# Patient Record
Sex: FEMALE | Race: WHITE | NOT HISPANIC OR LATINO
[De-identification: names, ages, dates, MRNs, and addresses within clinical notes are randomized per-mention and may not be internally consistent; named-entity substitution may affect disease eponyms.]

---

## 2017-06-12 ENCOUNTER — APPOINTMENT (OUTPATIENT)
Dept: FAMILY MEDICINE | Facility: CLINIC | Age: 77
End: 2017-06-12

## 2017-06-12 ENCOUNTER — NON-APPOINTMENT (OUTPATIENT)
Age: 77
End: 2017-06-12

## 2017-06-12 VITALS
BODY MASS INDEX: 24.57 KG/M2 | SYSTOLIC BLOOD PRESSURE: 122 MMHG | RESPIRATION RATE: 16 BRPM | DIASTOLIC BLOOD PRESSURE: 78 MMHG | HEART RATE: 71 BPM | OXYGEN SATURATION: 97 % | WEIGHT: 147.5 LBS | HEIGHT: 65 IN

## 2017-06-12 DIAGNOSIS — Z92.89 PERSONAL HISTORY OF OTHER MEDICAL TREATMENT: ICD-10-CM

## 2017-06-12 DIAGNOSIS — H26.9 UNSPECIFIED CATARACT: ICD-10-CM

## 2017-06-12 DIAGNOSIS — Z78.9 OTHER SPECIFIED HEALTH STATUS: ICD-10-CM

## 2017-06-12 DIAGNOSIS — Z90.49 ACQUIRED ABSENCE OF OTHER SPECIFIED PARTS OF DIGESTIVE TRACT: ICD-10-CM

## 2017-06-12 DIAGNOSIS — Z87.891 PERSONAL HISTORY OF NICOTINE DEPENDENCE: ICD-10-CM

## 2017-06-12 DIAGNOSIS — Z89.421 ACQUIRED ABSENCE OF OTHER RIGHT TOE(S): ICD-10-CM

## 2017-06-12 LAB
GLUCOSE BLDC GLUCOMTR-MCNC: NORMAL
GLUCOSE SERPL-MCNC: NORMAL

## 2017-09-04 ENCOUNTER — EMERGENCY (EMERGENCY)
Facility: HOSPITAL | Age: 77
LOS: 1 days | Discharge: HOME | End: 2017-09-04
Attending: EMERGENCY MEDICINE | Admitting: EMERGENCY MEDICINE
Payer: MEDICARE

## 2017-09-04 ENCOUNTER — EMERGENCY (EMERGENCY)
Facility: HOSPITAL | Age: 77
LOS: 1 days | Discharge: HOME | End: 2017-09-04
Admitting: EMERGENCY MEDICINE
Payer: MEDICARE

## 2017-09-04 VITALS
SYSTOLIC BLOOD PRESSURE: 195 MMHG | DIASTOLIC BLOOD PRESSURE: 90 MMHG | RESPIRATION RATE: 16 BRPM | HEART RATE: 72 BPM | OXYGEN SATURATION: 100 %

## 2017-09-04 VITALS — SYSTOLIC BLOOD PRESSURE: 175 MMHG | RESPIRATION RATE: 16 BRPM | DIASTOLIC BLOOD PRESSURE: 80 MMHG | HEART RATE: 74 BPM

## 2017-09-04 DIAGNOSIS — E11.9 TYPE 2 DIABETES MELLITUS WITHOUT COMPLICATIONS: ICD-10-CM

## 2017-09-04 DIAGNOSIS — Z79.82 LONG TERM (CURRENT) USE OF ASPIRIN: ICD-10-CM

## 2017-09-04 DIAGNOSIS — H53.8 OTHER VISUAL DISTURBANCES: ICD-10-CM

## 2017-09-04 DIAGNOSIS — H33.21 SEROUS RETINAL DETACHMENT, RIGHT EYE: ICD-10-CM

## 2017-09-04 DIAGNOSIS — Z91.013 ALLERGY TO SEAFOOD: ICD-10-CM

## 2017-09-04 DIAGNOSIS — Z79.899 OTHER LONG TERM (CURRENT) DRUG THERAPY: ICD-10-CM

## 2017-09-04 DIAGNOSIS — Z79.4 LONG TERM (CURRENT) USE OF INSULIN: ICD-10-CM

## 2017-09-04 DIAGNOSIS — I10 ESSENTIAL (PRIMARY) HYPERTENSION: ICD-10-CM

## 2017-09-04 DIAGNOSIS — Z90.49 ACQUIRED ABSENCE OF OTHER SPECIFIED PARTS OF DIGESTIVE TRACT: Chronic | ICD-10-CM

## 2017-09-04 DIAGNOSIS — Z88.0 ALLERGY STATUS TO PENICILLIN: ICD-10-CM

## 2017-09-04 PROCEDURE — 99285 EMERGENCY DEPT VISIT HI MDM: CPT | Mod: 25

## 2017-09-04 PROCEDURE — 82962 GLUCOSE BLOOD TEST: CPT

## 2017-09-04 PROCEDURE — 70450 CT HEAD/BRAIN W/O DYE: CPT | Mod: 26

## 2017-09-04 PROCEDURE — 99285 EMERGENCY DEPT VISIT HI MDM: CPT

## 2017-09-04 PROCEDURE — 99284 EMERGENCY DEPT VISIT MOD MDM: CPT | Mod: GC

## 2017-09-04 PROCEDURE — 80048 BASIC METABOLIC PNL TOTAL CA: CPT

## 2017-09-04 PROCEDURE — 85730 THROMBOPLASTIN TIME PARTIAL: CPT

## 2017-09-04 PROCEDURE — 99284 EMERGENCY DEPT VISIT MOD MDM: CPT

## 2017-09-04 PROCEDURE — 36415 COLL VENOUS BLD VENIPUNCTURE: CPT

## 2017-09-04 PROCEDURE — 70450 CT HEAD/BRAIN W/O DYE: CPT

## 2017-09-04 PROCEDURE — 85027 COMPLETE CBC AUTOMATED: CPT

## 2017-09-04 PROCEDURE — 85610 PROTHROMBIN TIME: CPT

## 2017-09-04 NOTE — ED PROVIDER NOTE - CARE PLAN
Principal Discharge DX:	Retinal detachment of right eye with single break  Instructions for follow-up, activity and diet:	You were seen today for painless vision loss of your right eye. You were determined to have retinal detachment. You were seen by our ophthalmologists and you do not require emergent surgery at this time. You have an appointment for correction of your condition tomorrow at [facility] at [address] [place] [address] [address] [place] [address].  Phone number [phone].  You must return for new, worsening or concerning symptoms; specifically including those listed on the attached sheet.

## 2017-09-04 NOTE — ED PROVIDER NOTE - NS ED ROS FT
CONST: no fevers, no chills, weight loss, weakness, appetite changes  EYES: no pain, + vision loss right eye  ENT: no sore throat, no cough  CV: no chest pain, no palpitations  RESP: no shortness of breath  ABD: no abdominal pain, no nausea, no vomitting  : no dysuria, increased frequency, or hematuria  MSK: no back pain  NEURO: no headache or additional neurologic complaints  HEME: no easy bleeding  SKIN:  no rash

## 2017-09-04 NOTE — ED PROVIDER NOTE - PHYSICAL EXAMINATION
General: AAOx3  Head: NCAT  Eye: Right eye sees colors but unable to read, Left eye 20/30, PERRLA, EOMI, conjunctiva pink, no conjunctival injection  ENT: Airway patent, moist mucous membranes, nasal passageways clear, no pharyngeal erythema or exudates, uvula midline, no cervical lymphadenopathy, well healed right side scar from previous neck cancer excision, asymmetry of neck and right jaw swelling due to recent dental work  Cardiac: Normal rate, normal rhythm, no murmurs  Respiratory: Lungs CTA B/L  Gastrointestinal: Abdomen soft, nontender, nondistended  MSK: No gross abnormalities, FROM of all four extremities, no edema  HEME: Extremities warm, pulses intact and symmetrical in all four extremities  Skin: No rashes, no lesions  Neuro: No gross neurologic deficits, CN II-XII intact

## 2017-09-04 NOTE — ED ADULT NURSE NOTE - OBJECTIVE STATEMENT
pt transferred from [place] for visual loss r eye that started 2 days ago . JAZMYNE R eye pat able l to see light  and identify finger @2 feetand to R of pt. pt denies pain

## 2017-09-04 NOTE — CONSULT NOTE ADULT - SUBJECTIVE AND OBJECTIVE BOX
CC: I can't see out of my right eye      HPI: 78 y/o f hx DMII c/o right eye painless vision loss for 1 day. Patient noticed many floaters yesterday during the day followed by a curtain over her vison. This morning patient noticed she had extreme difficulty seeing out of the right eye, noticing only shapes.     PMH: DMII, neck cancer s/p chemo radiation 14 years ago  POcHx: Bilateral Cataract surgery approximately 16 years ago. No history of trauma or laser.    Medications: Leveir, enalapril, lovothyroxine, ASA  Allergies: Penicillin, shellfish    Ophthalmology Exam    Visual acuity (cc) NEAR:  Hand motion 3 feet  OD,  20/25-2 OS  Pupils: PERRL, minimally reactive OU  Extraocular movements (EOMs): Full  Confrontational Visual Field (CVF):  Unable to assess OD, Full OS  Ttono:    14 OD,   16  OS      Slit Lamp Exam (SLE)  External:  wnl OU  Lids/Lashes/Lacrimal Ducts:  wnl OU  Sclera/Conjunctiva:  White and quiet OU  Cornea: Clear OU  Anterior Chamber: Deep and quiet OU  Iris:  Flat and formed OU  Lens:  pseudophakic OU    Fundus Exam  (dilated with 1% tropicamide and 2.5% phenylephrinePatient aware that pupils can remained dilated for at least 4-6 hours      Vitreous:   Cup/Disc:   Macula:    Vessels:    Periphery:     A/P: 78 y/o f with right eye painless vision loss  -Retinal detachment right eye  -No acute intervention.  -Possible surgery tomorrow, patient will follow with retina group at 8:00 am.        Follow-Up:  Patient will follow up with [facility] tomorrow. Patient instructed not to eat after midnight tonight and to skip one dose of ASA as she may need to undergo retinal detachment repair surgery.           [facility] ? [facility] [place] [address] [address], [place] [address]Fqn: [phone] <[email or web address]  Fax: [phone] <[email or web address]> CC: I can't see out of my right eye      HPI: 78 y/o f hx DMII c/o right eye painless vision loss for 1 day. Patient noticed many floaters yesterday during the day followed by a curtain over her vison. This morning patient noticed she had extreme difficulty seeing out of the right eye, noticing only shapes.     PMH: DMII, neck cancer s/p chemo radiation 14 years ago  POcHx: Bilateral Cataract surgery approximately 16 years ago. No history of trauma or laser.    Medications: Leveir, enalapril, lovothyroxine, ASA  Allergies: Penicillin, shellfish    Ophthalmology Exam    Visual acuity (cc) NEAR:  Hand motion 3 feet  OD,  20/25-2 OS  Pupils: PERRL, minimally reactive OU  Extraocular movements (EOMs): Full  Confrontational Visual Field (CVF):  Unable to assess OD, Full OS  Ttono:    14 OD,   16  OS      Slit Lamp Exam (SLE)  External:  wnl OU  Lids/Lashes/Lacrimal Ducts:  wnl OU  Sclera/Conjunctiva:  White and quiet OU  Cornea: Clear OU  Anterior Chamber: Deep and quiet OU  Iris:  Flat and formed OU  Lens:  pseudophakic OU    Fundus Exam  (dilated with 1% tropicamide and 2.5% phenylephrinePatient aware that pupils can remained dilated for at least 4-6 hours      Vitreous: clear  Cup/Disc: 0.2 OU  Macula:  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  -Possible surgery tomorrow, patient will follow with retina group at 8:00 am.        Follow-Up:  Patient will follow up with [facility] tomorrow. Patient instructed not to eat after midnight tonight as she may need to undergo retinal detachment repair surgery.           [facility] ? [address], [place] [address]Gnq: [phone] <[email or web address]  Fax: [phone] <[email or web address]

## 2017-09-04 NOTE — ED PROVIDER NOTE - PLAN OF CARE
You were seen today for painless vision loss of your right eye. You were determined to have retinal detachment. You were seen by our ophthalmologists and you do not require emergent surgery at this time. You have an appointment for correction of your condition tomorrow at [facility] at [address] [place] [address].  Phone number [phone].  You must return for new, worsening or concerning symptoms; specifically including those listed on the attached sheet.

## 2017-09-04 NOTE — ED PROVIDER NOTE - OBJECTIVE STATEMENT
77 rule out F PMHx DM II, neck cancer s/p r/s, chemo and radiation 14 years ago, c/o painless right vision loss yesterday afternoon 77 rule out F PMHx DM II, neck cancer s/p r/s, chemo and radiation 14 years ago, c/o painless right vision loss yesterday afternoon. Notes blurry vision and at night realized that she had complete vision loss of the right eye. Vision has since come back this AM with decreased visual acuity. Denies previous ocular disease or glaucoma. Denies weakness, numbness, gait abnormalities or dysphagia. Denies chest pain and palpiati    PCP: Dr David Cabrera. 77 rule out F PMHx DM II, neck cancer s/p r/s, chemo and radiation 14 years ago, c/o painless right vision loss yesterday afternoon. Notes blurry vision and at night realized that she had complete vision loss of the right eye. Vision has since come back this AM with decreased visual acuity. Denies previous ocular disease or glaucoma. Denies weakness, numbness, gait abnormalities or dysphagia. Denies chest pain and palpiations    PCP: Dr David Cabrera.

## 2017-09-04 NOTE — ED PROVIDER NOTE - ATTENDING CONTRIBUTION TO CARE
77y F hx DM, HTN, neck cancer sp chemo, radiation and radical neck dissection here with painless vision loss R eye states started gradually and progressed.

## 2017-09-04 NOTE — ED PROVIDER NOTE - MEDICAL DECISION MAKING DETAILS
vitreous hemorrhage vs detachment vs arterial occlusion, consult optho vitreous hemorrhage vs detachment vs arterial occlusion, consult optho  Magen: See attending statement below vitreous hemorrhage vs retinal detachment vs arterial occlusion, consult optho  Magen: See attending statement below

## 2017-09-06 ENCOUNTER — TRANSCRIPTION ENCOUNTER (OUTPATIENT)
Age: 77
End: 2017-09-06

## 2017-09-06 ENCOUNTER — OUTPATIENT (OUTPATIENT)
Dept: OUTPATIENT SERVICES | Facility: HOSPITAL | Age: 77
LOS: 1 days | End: 2017-09-06
Payer: MEDICARE

## 2017-09-06 VITALS
WEIGHT: 146.39 LBS | HEIGHT: 63.5 IN | TEMPERATURE: 99 F | HEART RATE: 68 BPM | RESPIRATION RATE: 24 BRPM | DIASTOLIC BLOOD PRESSURE: 58 MMHG | SYSTOLIC BLOOD PRESSURE: 136 MMHG | OXYGEN SATURATION: 98 %

## 2017-09-06 VITALS
OXYGEN SATURATION: 96 % | RESPIRATION RATE: 18 BRPM | DIASTOLIC BLOOD PRESSURE: 61 MMHG | SYSTOLIC BLOOD PRESSURE: 113 MMHG | HEART RATE: 70 BPM

## 2017-09-06 DIAGNOSIS — Z41.9 ENCOUNTER FOR PROCEDURE FOR PURPOSES OTHER THAN REMEDYING HEALTH STATE, UNSPECIFIED: Chronic | ICD-10-CM

## 2017-09-06 DIAGNOSIS — Z90.49 ACQUIRED ABSENCE OF OTHER SPECIFIED PARTS OF DIGESTIVE TRACT: Chronic | ICD-10-CM

## 2017-09-06 DIAGNOSIS — H33.011 RETINAL DETACHMENT WITH SINGLE BREAK, RIGHT EYE: ICD-10-CM

## 2017-09-06 LAB
25(OH)D3 SERPL-MCNC: 36.7 NG/ML
ALBUMIN SERPL ELPH-MCNC: 4.3 G/DL
ALP BLD-CCNC: 60 U/L
ALT SERPL-CCNC: 8 U/L
ANION GAP SERPL CALC-SCNC: 14 MMOL/L
AST SERPL-CCNC: 18 U/L
BASOPHILS # BLD AUTO: 0.04 K/UL
BASOPHILS NFR BLD AUTO: 0.8 %
BILIRUB SERPL-MCNC: 0.4 MG/DL
BUN SERPL-MCNC: 20 MG/DL
CALCIUM SERPL-MCNC: 9.6 MG/DL
CHLORIDE SERPL-SCNC: 105 MMOL/L
CHOLEST SERPL-MCNC: 179 MG/DL
CHOLEST/HDLC SERPL: 2.6 RATIO
CO2 SERPL-SCNC: 26 MMOL/L
CREAT SERPL-MCNC: 1.05 MG/DL
EOSINOPHIL # BLD AUTO: 0.26 K/UL
EOSINOPHIL NFR BLD AUTO: 4.9 %
GLUCOSE SERPL-MCNC: 91 MG/DL
HBA1C MFR BLD HPLC: 6.8 %
HCT VFR BLD CALC: 35.5 %
HDLC SERPL-MCNC: 70 MG/DL
HGB BLD-MCNC: 11.2 G/DL
IMM GRANULOCYTES NFR BLD AUTO: 0.2 %
LDLC SERPL CALC-MCNC: 93 MG/DL
LYMPHOCYTES # BLD AUTO: 1.61 K/UL
LYMPHOCYTES NFR BLD AUTO: 30.4 %
MAN DIFF?: NORMAL
MCHC RBC-ENTMCNC: 28.1 PG
MCHC RBC-ENTMCNC: 31.5 GM/DL
MCV RBC AUTO: 89.2 FL
MONOCYTES # BLD AUTO: 0.63 K/UL
MONOCYTES NFR BLD AUTO: 11.9 %
NEUTROPHILS # BLD AUTO: 2.74 K/UL
NEUTROPHILS NFR BLD AUTO: 51.8 %
PLATELET # BLD AUTO: 209 K/UL
POTASSIUM SERPL-SCNC: 4.9 MMOL/L
PROT SERPL-MCNC: 6.8 G/DL
RBC # BLD: 3.98 M/UL
RBC # FLD: 13 %
SODIUM SERPL-SCNC: 145 MMOL/L
TRIGL SERPL-MCNC: 79 MG/DL
TSH SERPL-ACNC: 0.99 UIU/ML
WBC # FLD AUTO: 5.29 K/UL

## 2017-09-06 PROCEDURE — C1889: CPT

## 2017-09-06 PROCEDURE — 93010 ELECTROCARDIOGRAM REPORT: CPT

## 2017-09-06 PROCEDURE — 67108 REPAIR DETACHED RETINA: CPT | Mod: RT

## 2017-09-06 PROCEDURE — 93005 ELECTROCARDIOGRAM TRACING: CPT

## 2017-09-06 PROCEDURE — C1814: CPT

## 2017-09-06 NOTE — ASU DISCHARGE PLAN (ADULT/PEDIATRIC). - NOTIFY
Bleeding that does not stop/Persistent Nausea and Vomiting/Pain not relieved by Medications/Fever greater than 101/Swelling that continues

## 2017-09-11 ENCOUNTER — APPOINTMENT (OUTPATIENT)
Dept: FAMILY MEDICINE | Facility: CLINIC | Age: 77
End: 2017-09-11
Payer: MEDICARE

## 2017-09-11 VITALS
SYSTOLIC BLOOD PRESSURE: 152 MMHG | BODY MASS INDEX: 24.83 KG/M2 | WEIGHT: 149.06 LBS | HEART RATE: 64 BPM | RESPIRATION RATE: 16 BRPM | DIASTOLIC BLOOD PRESSURE: 72 MMHG | HEIGHT: 65 IN | OXYGEN SATURATION: 99 %

## 2017-09-11 PROCEDURE — 90686 IIV4 VACC NO PRSV 0.5 ML IM: CPT

## 2017-09-11 PROCEDURE — 99213 OFFICE O/P EST LOW 20 MIN: CPT | Mod: 25

## 2017-09-11 PROCEDURE — G0008: CPT

## 2017-11-08 ENCOUNTER — MEDICATION RENEWAL (OUTPATIENT)
Age: 77
End: 2017-11-08

## 2017-11-13 ENCOUNTER — MEDICATION RENEWAL (OUTPATIENT)
Age: 77
End: 2017-11-13

## 2017-12-05 ENCOUNTER — RX RENEWAL (OUTPATIENT)
Age: 77
End: 2017-12-05

## 2017-12-11 ENCOUNTER — APPOINTMENT (OUTPATIENT)
Dept: FAMILY MEDICINE | Facility: CLINIC | Age: 77
End: 2017-12-11
Payer: MEDICARE

## 2017-12-11 VITALS
HEIGHT: 65 IN | RESPIRATION RATE: 16 BRPM | OXYGEN SATURATION: 98 % | WEIGHT: 152.38 LBS | BODY MASS INDEX: 25.39 KG/M2 | SYSTOLIC BLOOD PRESSURE: 174 MMHG | HEART RATE: 74 BPM | DIASTOLIC BLOOD PRESSURE: 88 MMHG

## 2017-12-11 VITALS — DIASTOLIC BLOOD PRESSURE: 72 MMHG | SYSTOLIC BLOOD PRESSURE: 140 MMHG

## 2017-12-11 DIAGNOSIS — H33.20 SEROUS RETINAL DETACHMENT, UNSPECIFIED EYE: ICD-10-CM

## 2017-12-11 DIAGNOSIS — Z87.891 PERSONAL HISTORY OF NICOTINE DEPENDENCE: ICD-10-CM

## 2017-12-11 PROCEDURE — 99214 OFFICE O/P EST MOD 30 MIN: CPT

## 2018-03-12 ENCOUNTER — APPOINTMENT (OUTPATIENT)
Dept: FAMILY MEDICINE | Facility: CLINIC | Age: 78
End: 2018-03-12

## 2018-03-19 ENCOUNTER — RX RENEWAL (OUTPATIENT)
Age: 78
End: 2018-03-19

## 2018-03-27 LAB
ALBUMIN SERPL ELPH-MCNC: 3.9 G/DL
ANION GAP SERPL CALC-SCNC: 14 MMOL/L
BUN SERPL-MCNC: 23 MG/DL
CALCIUM SERPL-MCNC: 9.6 MG/DL
CHLORIDE SERPL-SCNC: 102 MMOL/L
CO2 SERPL-SCNC: 26 MMOL/L
CREAT SERPL-MCNC: 1.04 MG/DL
ERYTHROCYTE [SEDIMENTATION RATE] IN BLOOD BY WESTERGREN METHOD: 2 MM/HR
GLUCOSE SERPL-MCNC: 201 MG/DL
HBA1C MFR BLD HPLC: 7.4 %
PHOSPHATE SERPL-MCNC: 3.9 MG/DL
POTASSIUM SERPL-SCNC: 5 MMOL/L
SODIUM SERPL-SCNC: 142 MMOL/L

## 2018-03-29 ENCOUNTER — CHART COPY (OUTPATIENT)
Age: 78
End: 2018-03-29

## 2018-04-02 ENCOUNTER — APPOINTMENT (OUTPATIENT)
Dept: FAMILY MEDICINE | Facility: CLINIC | Age: 78
End: 2018-04-02
Payer: MEDICARE

## 2018-04-02 VITALS
WEIGHT: 154.25 LBS | RESPIRATION RATE: 16 BRPM | DIASTOLIC BLOOD PRESSURE: 90 MMHG | SYSTOLIC BLOOD PRESSURE: 130 MMHG | OXYGEN SATURATION: 98 % | BODY MASS INDEX: 25.7 KG/M2 | HEART RATE: 62 BPM | HEIGHT: 65 IN

## 2018-04-02 LAB — GLUCOSE BLDC GLUCOMTR-MCNC: ABNORMAL

## 2018-04-02 PROCEDURE — 82962 GLUCOSE BLOOD TEST: CPT

## 2018-04-02 PROCEDURE — 99214 OFFICE O/P EST MOD 30 MIN: CPT

## 2018-04-13 ENCOUNTER — RX RENEWAL (OUTPATIENT)
Age: 78
End: 2018-04-13

## 2018-04-25 ENCOUNTER — APPOINTMENT (OUTPATIENT)
Dept: FAMILY MEDICINE | Facility: CLINIC | Age: 78
End: 2018-04-25
Payer: MEDICARE

## 2018-04-25 VITALS
HEART RATE: 73 BPM | WEIGHT: 154.19 LBS | DIASTOLIC BLOOD PRESSURE: 72 MMHG | BODY MASS INDEX: 25.69 KG/M2 | HEIGHT: 65 IN | RESPIRATION RATE: 16 BRPM | OXYGEN SATURATION: 98 % | SYSTOLIC BLOOD PRESSURE: 134 MMHG

## 2018-04-25 LAB
GLUCOSE BLDC GLUCOMTR-MCNC: ABNORMAL
GLUCOSE SERPL-MCNC: ABNORMAL

## 2018-04-25 PROCEDURE — 90471 IMMUNIZATION ADMIN: CPT | Mod: GY

## 2018-04-25 PROCEDURE — 82962 GLUCOSE BLOOD TEST: CPT

## 2018-04-25 PROCEDURE — 90715 TDAP VACCINE 7 YRS/> IM: CPT | Mod: GY

## 2018-04-25 PROCEDURE — 99214 OFFICE O/P EST MOD 30 MIN: CPT | Mod: 25

## 2018-05-07 ENCOUNTER — APPOINTMENT (OUTPATIENT)
Dept: FAMILY MEDICINE | Facility: CLINIC | Age: 78
End: 2018-05-07

## 2018-06-19 ENCOUNTER — RX RENEWAL (OUTPATIENT)
Age: 78
End: 2018-06-19

## 2018-08-06 ENCOUNTER — RX RENEWAL (OUTPATIENT)
Age: 78
End: 2018-08-06

## 2018-09-18 ENCOUNTER — RX RENEWAL (OUTPATIENT)
Age: 78
End: 2018-09-18

## 2018-12-17 ENCOUNTER — MEDICATION RENEWAL (OUTPATIENT)
Age: 78
End: 2018-12-17

## 2018-12-27 ENCOUNTER — LABORATORY RESULT (OUTPATIENT)
Age: 78
End: 2018-12-27

## 2018-12-31 ENCOUNTER — MEDICATION RENEWAL (OUTPATIENT)
Age: 78
End: 2018-12-31

## 2019-01-16 ENCOUNTER — MEDICATION RENEWAL (OUTPATIENT)
Age: 79
End: 2019-01-16

## 2019-03-25 ENCOUNTER — MEDICATION RENEWAL (OUTPATIENT)
Age: 79
End: 2019-03-25

## 2019-03-25 PROBLEM — C76.0 MALIGNANT NEOPLASM OF HEAD, FACE AND NECK: Chronic | Status: ACTIVE | Noted: 2017-09-04

## 2019-03-25 PROBLEM — E03.9 HYPOTHYROIDISM, UNSPECIFIED: Chronic | Status: ACTIVE | Noted: 2017-09-04

## 2019-03-25 PROBLEM — E11.9 TYPE 2 DIABETES MELLITUS WITHOUT COMPLICATIONS: Chronic | Status: ACTIVE | Noted: 2017-09-04

## 2019-03-25 PROBLEM — I10 ESSENTIAL (PRIMARY) HYPERTENSION: Chronic | Status: ACTIVE | Noted: 2017-09-04

## 2019-03-29 ENCOUNTER — APPOINTMENT (OUTPATIENT)
Dept: FAMILY MEDICINE | Facility: CLINIC | Age: 79
End: 2019-03-29
Payer: MEDICARE

## 2019-03-29 VITALS
DIASTOLIC BLOOD PRESSURE: 92 MMHG | BODY MASS INDEX: 25.04 KG/M2 | HEART RATE: 64 BPM | HEIGHT: 65 IN | SYSTOLIC BLOOD PRESSURE: 138 MMHG | RESPIRATION RATE: 16 BRPM | OXYGEN SATURATION: 98 % | WEIGHT: 150.31 LBS

## 2019-03-29 DIAGNOSIS — D64.9 ANEMIA, UNSPECIFIED: ICD-10-CM

## 2019-03-29 PROCEDURE — 99213 OFFICE O/P EST LOW 20 MIN: CPT

## 2019-03-29 NOTE — HISTORY OF PRESENT ILLNESS
[FreeTextEntry1] : Patient is here for followup on her diabetes [de-identified] : The patient is here for followup on her diabetes she brings in a several month diary of her blood sugars which look good several lows in the high 70s several readings above 180-190 otherwise well within range she is now on 12 units of basal insulin review of systems is totally unremarkable she has had no visits to other physicians other than ophthalmology in the past year and has had some significant dental work done

## 2019-03-29 NOTE — PHYSICAL EXAM
[No Acute Distress] : no acute distress [Well Nourished] : well nourished [Well Developed] : well developed [Well-Appearing] : well-appearing [Normal Sclera/Conjunctiva] : normal sclera/conjunctiva [Normal Outer Ear/Nose] : the outer ears and nose were normal in appearance [Normal Oropharynx] : the oropharynx was normal [Normal TMs] : both tympanic membranes were normal [Supple] : supple [No Lymphadenopathy] : no lymphadenopathy [Thyroid Normal, No Nodules] : the thyroid was normal and there were no nodules present [No Respiratory Distress] : no respiratory distress  [Clear to Auscultation] : lungs were clear to auscultation bilaterally [Normal Rate] : normal rate  [Regular Rhythm] : with a regular rhythm [No Murmur] : no murmur heard [No Edema] : there was no peripheral edema [Soft] : abdomen soft [Non Tender] : non-tender [Normal Bowel Sounds] : normal bowel sounds [Normal Supraclavicular Nodes] : no supraclavicular lymphadenopathy [Normal Posterior Cervical Nodes] : no posterior cervical lymphadenopathy [Normal Anterior Cervical Nodes] : no anterior cervical lymphadenopathy [No CVA Tenderness] : no CVA  tenderness [No Spinal Tenderness] : no spinal tenderness

## 2019-04-09 LAB
ALBUMIN SERPL ELPH-MCNC: 4.3 G/DL
ANION GAP SERPL CALC-SCNC: 11 MMOL/L
BASOPHILS # BLD AUTO: 0.04 K/UL
BASOPHILS NFR BLD AUTO: 0.7 %
BUN SERPL-MCNC: 23 MG/DL
CALCIUM SERPL-MCNC: 9.3 MG/DL
CHLORIDE SERPL-SCNC: 102 MMOL/L
CHOLEST SERPL-MCNC: 141 MG/DL
CHOLEST/HDLC SERPL: 2.2 RATIO
CO2 SERPL-SCNC: 28 MMOL/L
CREAT SERPL-MCNC: 0.98 MG/DL
EOSINOPHIL # BLD AUTO: 0.2 K/UL
EOSINOPHIL NFR BLD AUTO: 3.5 %
ESTIMATED AVERAGE GLUCOSE: 160 MG/DL
GLUCOSE SERPL-MCNC: 117 MG/DL
HBA1C MFR BLD HPLC: 7.2 %
HCT VFR BLD CALC: 39.3 %
HDLC SERPL-MCNC: 64 MG/DL
HGB BLD-MCNC: 11.9 G/DL
IMM GRANULOCYTES NFR BLD AUTO: 0.2 %
LDLC SERPL CALC-MCNC: 66 MG/DL
LYMPHOCYTES # BLD AUTO: 1.27 K/UL
LYMPHOCYTES NFR BLD AUTO: 22.2 %
MAN DIFF?: NORMAL
MCHC RBC-ENTMCNC: 28.8 PG
MCHC RBC-ENTMCNC: 30.3 GM/DL
MCV RBC AUTO: 95.2 FL
MONOCYTES # BLD AUTO: 0.62 K/UL
MONOCYTES NFR BLD AUTO: 10.9 %
NEUTROPHILS # BLD AUTO: 3.57 K/UL
NEUTROPHILS NFR BLD AUTO: 62.5 %
PHOSPHATE SERPL-MCNC: 3.6 MG/DL
PLATELET # BLD AUTO: 176 K/UL
POTASSIUM SERPL-SCNC: 5.2 MMOL/L
RBC # BLD: 4.13 M/UL
RBC # FLD: 12.5 %
SODIUM SERPL-SCNC: 141 MMOL/L
TRIGL SERPL-MCNC: 55 MG/DL
WBC # FLD AUTO: 5.71 K/UL

## 2019-05-14 ENCOUNTER — APPOINTMENT (OUTPATIENT)
Dept: RADIOLOGY | Facility: HOSPITAL | Age: 79
End: 2019-05-14
Payer: MEDICARE

## 2019-05-14 ENCOUNTER — APPOINTMENT (OUTPATIENT)
Dept: FAMILY MEDICINE | Facility: CLINIC | Age: 79
End: 2019-05-14
Payer: MEDICARE

## 2019-05-14 ENCOUNTER — OUTPATIENT (OUTPATIENT)
Dept: OUTPATIENT SERVICES | Facility: HOSPITAL | Age: 79
LOS: 1 days | End: 2019-05-14
Payer: MEDICARE

## 2019-05-14 VITALS
SYSTOLIC BLOOD PRESSURE: 125 MMHG | HEIGHT: 65 IN | HEART RATE: 69 BPM | DIASTOLIC BLOOD PRESSURE: 79 MMHG | OXYGEN SATURATION: 97 % | WEIGHT: 147 LBS | BODY MASS INDEX: 24.49 KG/M2 | TEMPERATURE: 97.8 F | RESPIRATION RATE: 15 BRPM

## 2019-05-14 DIAGNOSIS — R05 COUGH: ICD-10-CM

## 2019-05-14 DIAGNOSIS — Z90.49 ACQUIRED ABSENCE OF OTHER SPECIFIED PARTS OF DIGESTIVE TRACT: Chronic | ICD-10-CM

## 2019-05-14 DIAGNOSIS — Z41.9 ENCOUNTER FOR PROCEDURE FOR PURPOSES OTHER THAN REMEDYING HEALTH STATE, UNSPECIFIED: Chronic | ICD-10-CM

## 2019-05-14 PROCEDURE — 99213 OFFICE O/P EST LOW 20 MIN: CPT

## 2019-05-14 PROCEDURE — 71046 X-RAY EXAM CHEST 2 VIEWS: CPT

## 2019-05-14 PROCEDURE — 71046 X-RAY EXAM CHEST 2 VIEWS: CPT | Mod: 26

## 2019-05-14 RX ORDER — ASPIRIN 81 MG
6.5 TABLET, DELAYED RELEASE (ENTERIC COATED) ORAL
Qty: 1 | Refills: 0 | Status: DISCONTINUED | COMMUNITY
Start: 2017-06-12 | End: 2019-05-14

## 2019-05-14 NOTE — REVIEW OF SYSTEMS
[Fatigue] : fatigue [Cough] : cough [Fever] : no fever [Chills] : no chills [Night Sweats] : no night sweats [Discharge] : no discharge [Vision Problems] : no vision problems [Earache] : no earache [Hoarseness] : no hoarseness [Hearing Loss] : no hearing loss [Nasal Discharge] : no nasal discharge [Sore Throat] : no sore throat [Palpitations] : no palpitations [Chest Pain] : no chest pain [Paroysmal Nocturnal Dyspnea] : no paroysmal nocturnal dyspnea [Leg Claudication] : no leg claudication [Shortness Of Breath] : no shortness of breath [Dyspnea on Exertion] : no dyspnea on exertion [Wheezing] : no wheezing [Abdominal Pain] : no abdominal pain [Nausea] : no nausea [Constipation] : no constipation [Vomiting] : no vomiting [Heartburn] : no heartburn [Melena] : no melena [Dysuria] : no dysuria [Incontinence] : no incontinence [Joint Pain] : no joint pain [Hematuria] : no hematuria [Headache] : no headache [Dizziness] : no dizziness [Fainting] : no fainting

## 2019-05-14 NOTE — ASSESSMENT
[FreeTextEntry1] : Patient be sent for an x-ray with a stat reading and return here to the office for directions

## 2019-05-14 NOTE — PHYSICAL EXAM
[No Acute Distress] : no acute distress [Well Nourished] : well nourished [Well Developed] : well developed [Well-Appearing] : well-appearing [Normal Sclera/Conjunctiva] : normal sclera/conjunctiva [PERRL] : pupils equal round and reactive to light [Normal Outer Ear/Nose] : the outer ears and nose were normal in appearance [Normal Oropharynx] : the oropharynx was normal [Supple] : supple [No Respiratory Distress] : no respiratory distress  [No Lymphadenopathy] : no lymphadenopathy [Thyroid Normal, No Nodules] : the thyroid was normal and there were no nodules present [Normal Rate] : normal rate  [Regular Rhythm] : with a regular rhythm [Non Tender] : non-tender [Soft] : abdomen soft [No Edema] : there was no peripheral edema [No HSM] : no HSM [Normal Posterior Cervical Nodes] : no posterior cervical lymphadenopathy [Normal Supraclavicular Nodes] : no supraclavicular lymphadenopathy [No CVA Tenderness] : no CVA  tenderness [Normal Anterior Cervical Nodes] : no anterior cervical lymphadenopathy [No Rash] : no rash [No Spinal Tenderness] : no spinal tenderness [de-identified] : Rhonchi right lower and mid lung field

## 2019-05-14 NOTE — HISTORY OF PRESENT ILLNESS
[FreeTextEntry8] : He is here due to persistent cough now productive of yellow sputum she feels this is getting worse no chest pain or shortness of breath review of systems is otherwise unremarkable

## 2019-05-20 ENCOUNTER — CLINICAL ADVICE (OUTPATIENT)
Age: 79
End: 2019-05-20

## 2019-05-20 ENCOUNTER — APPOINTMENT (OUTPATIENT)
Dept: FAMILY MEDICINE | Facility: CLINIC | Age: 79
End: 2019-05-20
Payer: MEDICARE

## 2019-05-20 VITALS
HEART RATE: 74 BPM | SYSTOLIC BLOOD PRESSURE: 144 MMHG | RESPIRATION RATE: 16 BRPM | TEMPERATURE: 98.2 F | OXYGEN SATURATION: 97 % | DIASTOLIC BLOOD PRESSURE: 74 MMHG | BODY MASS INDEX: 24.83 KG/M2 | HEIGHT: 65 IN | WEIGHT: 149.06 LBS

## 2019-05-20 DIAGNOSIS — R23.3 SPONTANEOUS ECCHYMOSES: ICD-10-CM

## 2019-05-20 PROCEDURE — 99213 OFFICE O/P EST LOW 20 MIN: CPT

## 2019-05-20 NOTE — HISTORY OF PRESENT ILLNESS
[FreeTextEntry8] : The patient is here today complaining of about 6 or 7 scattered rounded ecchymotic areas on her elbows and forearms these appeared over the past week or so they are not pruritic there've been no systemic symptoms there is no bleeding from the gums or elsewhere in the body and these are located exclusively on her forearms elbows. She finished a course of azithromycin several days ago for cough which is still present but has not worsened it is productive of scant amounts of yellow phlegm no systemic symptoms she does not feel poorly

## 2019-05-20 NOTE — PHYSICAL EXAM
[No Acute Distress] : no acute distress [Well Nourished] : well nourished [Well Developed] : well developed [Well-Appearing] : well-appearing [Normal Sclera/Conjunctiva] : normal sclera/conjunctiva [PERRL] : pupils equal round and reactive to light [Normal Outer Ear/Nose] : the outer ears and nose were normal in appearance [Normal Oropharynx] : the oropharynx was normal [No JVD] : no jugular venous distention [Supple] : supple [No Lymphadenopathy] : no lymphadenopathy [No Respiratory Distress] : no respiratory distress  [Clear to Auscultation] : lungs were clear to auscultation bilaterally [No Accessory Muscle Use] : no accessory muscle use [Normal Percussion] : the chest was normal to percussion [Normal Rate] : normal rate  [Regular Rhythm] : with a regular rhythm [No Murmur] : no murmur heard [No Edema] : there was no peripheral edema [Soft] : abdomen soft [Normal Bowel Sounds] : normal bowel sounds [Normal Supraclavicular Nodes] : no supraclavicular lymphadenopathy [Normal Posterior Cervical Nodes] : no posterior cervical lymphadenopathy

## 2019-05-20 NOTE — ASSESSMENT
[FreeTextEntry1] : Scattered ecchymoses of the forearms no other symptomatology this likely may be from inadvertent trauma patient will be sent for CBC and followed up as needed. Her cough persists lungs are essentially clear . Patient has been told to call in a week should the cough worsen or continue

## 2019-05-20 NOTE — REVIEW OF SYSTEMS
[Shortness Of Breath] : no shortness of breath [Wheezing] : no wheezing [Cough] : cough [Dyspnea on Exertion] : no dyspnea on exertion [Negative] : Musculoskeletal

## 2019-06-01 LAB
BASOPHILS # BLD AUTO: 0.05 K/UL
BASOPHILS NFR BLD AUTO: 0.7 %
EOSINOPHIL # BLD AUTO: 0.19 K/UL
EOSINOPHIL NFR BLD AUTO: 2.6 %
HCT VFR BLD CALC: 34.1 %
HGB BLD-MCNC: 10.6 G/DL
IMM GRANULOCYTES NFR BLD AUTO: 0.3 %
LYMPHOCYTES # BLD AUTO: 1.48 K/UL
LYMPHOCYTES NFR BLD AUTO: 19.9 %
MAN DIFF?: NORMAL
MCHC RBC-ENTMCNC: 29.4 PG
MCHC RBC-ENTMCNC: 31.1 GM/DL
MCV RBC AUTO: 94.7 FL
MONOCYTES # BLD AUTO: 0.57 K/UL
MONOCYTES NFR BLD AUTO: 7.7 %
NEUTROPHILS # BLD AUTO: 5.14 K/UL
NEUTROPHILS NFR BLD AUTO: 68.8 %
PLATELET # BLD AUTO: 214 K/UL
RBC # BLD: 3.6 M/UL
RBC # FLD: 12.6 %
WBC # FLD AUTO: 7.45 K/UL

## 2019-06-03 LAB
BASOPHILS # BLD AUTO: 0.04 K/UL
BASOPHILS NFR BLD AUTO: 0.7 %
EOSINOPHIL # BLD AUTO: 0.2 K/UL
EOSINOPHIL NFR BLD AUTO: 3.4 %
ERYTHROCYTE [SEDIMENTATION RATE] IN BLOOD BY WESTERGREN METHOD: 12 MM/HR
FERRITIN SERPL-MCNC: 33 NG/ML
FOLATE SERPL-MCNC: 6.3 NG/ML
HCT VFR BLD CALC: 36.6 %
HGB BLD-MCNC: 11.2 G/DL
IMM GRANULOCYTES NFR BLD AUTO: 0.2 %
IRON SATN MFR SERPL: 24 %
IRON SERPL-MCNC: 68 UG/DL
LYMPHOCYTES # BLD AUTO: 1.09 K/UL
LYMPHOCYTES NFR BLD AUTO: 18.4 %
MAN DIFF?: NORMAL
MCHC RBC-ENTMCNC: 28.8 PG
MCHC RBC-ENTMCNC: 30.6 GM/DL
MCV RBC AUTO: 94.1 FL
MONOCYTES # BLD AUTO: 0.63 K/UL
MONOCYTES NFR BLD AUTO: 10.6 %
NEUTROPHILS # BLD AUTO: 3.97 K/UL
NEUTROPHILS NFR BLD AUTO: 66.7 %
PLATELET # BLD AUTO: 186 K/UL
RBC # BLD: 3.89 M/UL
RBC # BLD: 3.89 M/UL
RBC # FLD: 12.8 %
RETICS # AUTO: 1.3 %
RETICS AGGREG/RBC NFR: 52.1 K/UL
TIBC SERPL-MCNC: 283 UG/DL
UIBC SERPL-MCNC: 215 UG/DL
VIT B12 SERPL-MCNC: 439 PG/ML
WBC # FLD AUTO: 5.94 K/UL

## 2019-08-16 ENCOUNTER — MEDICATION RENEWAL (OUTPATIENT)
Age: 79
End: 2019-08-16

## 2019-08-30 ENCOUNTER — MEDICATION RENEWAL (OUTPATIENT)
Age: 79
End: 2019-08-30

## 2019-09-09 ENCOUNTER — MEDICATION RENEWAL (OUTPATIENT)
Age: 79
End: 2019-09-09

## 2019-10-09 ENCOUNTER — APPOINTMENT (OUTPATIENT)
Dept: FAMILY MEDICINE | Facility: CLINIC | Age: 79
End: 2019-10-09
Payer: MEDICARE

## 2019-10-09 VITALS
DIASTOLIC BLOOD PRESSURE: 60 MMHG | RESPIRATION RATE: 12 BRPM | HEART RATE: 69 BPM | OXYGEN SATURATION: 98 % | HEIGHT: 65 IN | BODY MASS INDEX: 25.04 KG/M2 | WEIGHT: 150.3 LBS | SYSTOLIC BLOOD PRESSURE: 142 MMHG

## 2019-10-09 PROCEDURE — G0439: CPT

## 2019-10-09 PROCEDURE — G0008: CPT

## 2019-10-09 PROCEDURE — 90686 IIV4 VACC NO PRSV 0.5 ML IM: CPT

## 2019-10-09 RX ORDER — AZITHROMYCIN 250 MG/1
250 TABLET, FILM COATED ORAL
Qty: 1 | Refills: 0 | Status: DISCONTINUED | COMMUNITY
Start: 2019-05-14 | End: 2019-10-09

## 2019-10-09 NOTE — HEALTH RISK ASSESSMENT
[Very Good] : ~his/her~ current health as very good [Fair] :  ~his/her~ mood as fair [No falls in past year] : Patient reported no falls in the past year [No] : In the past 12 months have you used drugs other than those required for medical reasons? No [0] : 1) Little interest or pleasure doing things: Not at all (0) [Patient declined mammogram] : Patient declined mammogram [Patient declined PAP Smear] : Patient declined PAP Smear [Patient declined bone density test] : Patient declined bone density test [Hepatitis C test declined] : Hepatitis C test declined [Patient declined colonoscopy] : Patient declined colonoscopy [HIV test declined] : HIV test declined [With Significant Other] : lives with significant other [None] : None [] :  [Fully functional (using the telephone, shopping, preparing meals, housekeeping, doing laundry, using] : Fully functional and needs no help or supervision to perform IADLs (using the telephone, shopping, preparing meals, housekeeping, doing laundry, using transportation, managing medications and managing finances) [Fully functional (bathing, dressing, toileting, transferring, walking, feeding)] : Fully functional (bathing, dressing, toileting, transferring, walking, feeding) [Seat Belt] :  uses seat belt [Carbon Monoxide Detector] : carbon monoxide detector [Smoke Detector] : smoke detector [Designated Healthcare Proxy] : Designated healthcare proxy [Name: ___] : Health Care Proxy's Name: [unfilled]  [Relationship: ___] : Relationship: [unfilled] [I will adhere to the patient's wishes as expressed in the advance directive except as noted below.] : I will adhere to the patient's wishes as expressed in the advance directive except as noted below [FreeTextEntry1] : daughter bipolar grandson [de-identified] : active [de-identified] : salads and greens [Change in mental status noted] : No change in mental status noted [Language] : denies difficulty with language [Behavior] : denies difficulty with behavior [Handling Complex Tasks] : denies difficulty handling complex tasks [Reasoning] : denies difficulty with reasoning [Reports changes in hearing] : Reports no changes in hearing [Reports changes in vision] : Reports no changes in vision [Reports changes in dental health] : Reports no changes in dental health [BoneDensityDate] : 6/2009 [ColonoscopyDate] : 6/2000 [de-identified] : retinal detached retina [AdvancecareDate] : 10/2019 [FreeTextEntry4] : would like to donate body to science

## 2019-10-09 NOTE — PHYSICAL EXAM
[No Acute Distress] : no acute distress [Well Nourished] : well nourished [Well Developed] : well developed [Normal Sclera/Conjunctiva] : normal sclera/conjunctiva [No JVD] : no jugular venous distention [Normal Oropharynx] : the oropharynx was normal [Normal Outer Ear/Nose] : the outer ears and nose were normal in appearance [No Lymphadenopathy] : no lymphadenopathy [No Respiratory Distress] : no respiratory distress  [No Accessory Muscle Use] : no accessory muscle use [Normal Rate] : normal rate  [Clear to Auscultation] : lungs were clear to auscultation bilaterally [Regular Rhythm] : with a regular rhythm [No Murmur] : no murmur heard [Soft] : abdomen soft [Non Tender] : non-tender [No Edema] : there was no peripheral edema [No HSM] : no HSM [No Masses] : no abdominal mass palpated [Normal Anterior Cervical Nodes] : no anterior cervical lymphadenopathy [Normal Supraclavicular Nodes] : no supraclavicular lymphadenopathy [Normal Posterior Cervical Nodes] : no posterior cervical lymphadenopathy [No CVA Tenderness] : no CVA  tenderness [No Spinal Tenderness] : no spinal tenderness [No Joint Swelling] : no joint swelling [Grossly Normal Strength/Tone] : grossly normal strength/tone [Coordination Grossly Intact] : coordination grossly intact [No Focal Deficits] : no focal deficits [Normal Gait] : normal gait [Speech Grossly Normal] : speech grossly normal [Normal Mood] : the mood was normal [de-identified] : s/p radical neck R side

## 2019-10-09 NOTE — REVIEW OF SYSTEMS
[Joint Pain] : joint pain [Joint Stiffness] : joint stiffness [Muscle Pain] : muscle pain [Negative] : Integumentary [Joint Swelling] : no joint swelling [Muscle Weakness] : no muscle weakness [Back Pain] : no back pain [FreeTextEntry9] : some aching r upper arm

## 2019-10-09 NOTE — PLAN
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 but did have a flu shot today diet and exercise it seems to be quite good review of last laboratory work is fairly unremarkable her immunizations are up to date and her medications have been reconciled with regards physicians she sees ophthalmology periodically and myself

## 2019-10-14 LAB
ALBUMIN SERPL ELPH-MCNC: 4.3 G/DL
ALP BLD-CCNC: 70 U/L
ALT SERPL-CCNC: 9 U/L
ANION GAP SERPL CALC-SCNC: 14 MMOL/L
APPEARANCE: ABNORMAL
AST SERPL-CCNC: 15 U/L
BASOPHILS # BLD AUTO: 0.05 K/UL
BASOPHILS NFR BLD AUTO: 1 %
BILIRUB SERPL-MCNC: 0.4 MG/DL
BILIRUBIN URINE: NEGATIVE
BLOOD URINE: ABNORMAL
BUN SERPL-MCNC: 22 MG/DL
CALCIUM SERPL-MCNC: 9.3 MG/DL
CHLORIDE SERPL-SCNC: 106 MMOL/L
CHOLEST SERPL-MCNC: 129 MG/DL
CHOLEST/HDLC SERPL: 2.1 RATIO
CO2 SERPL-SCNC: 24 MMOL/L
COLOR: YELLOW
CREAT SERPL-MCNC: 0.92 MG/DL
EOSINOPHIL # BLD AUTO: 0.19 K/UL
EOSINOPHIL NFR BLD AUTO: 3.7 %
ESTIMATED AVERAGE GLUCOSE: 154 MG/DL
GLUCOSE QUALITATIVE U: ABNORMAL
GLUCOSE SERPL-MCNC: 141 MG/DL
HBA1C MFR BLD HPLC: 7 %
HCT VFR BLD CALC: 37.9 %
HDLC SERPL-MCNC: 63 MG/DL
HGB BLD-MCNC: 11.9 G/DL
IMM GRANULOCYTES NFR BLD AUTO: 0.4 %
KETONES URINE: NEGATIVE
LDLC SERPL CALC-MCNC: 55 MG/DL
LEUKOCYTE ESTERASE URINE: ABNORMAL
LYMPHOCYTES # BLD AUTO: 1.31 K/UL
LYMPHOCYTES NFR BLD AUTO: 25.5 %
MAN DIFF?: NORMAL
MCHC RBC-ENTMCNC: 29.2 PG
MCHC RBC-ENTMCNC: 31.4 GM/DL
MCV RBC AUTO: 92.9 FL
MONOCYTES # BLD AUTO: 0.55 K/UL
MONOCYTES NFR BLD AUTO: 10.7 %
NEUTROPHILS # BLD AUTO: 3.02 K/UL
NEUTROPHILS NFR BLD AUTO: 58.7 %
NITRITE URINE: POSITIVE
PH URINE: 6.5
PLATELET # BLD AUTO: 195 K/UL
POTASSIUM SERPL-SCNC: 5.2 MMOL/L
PROT SERPL-MCNC: 6.5 G/DL
PROTEIN URINE: ABNORMAL
RBC # BLD: 4.08 M/UL
RBC # FLD: 12.5 %
SODIUM SERPL-SCNC: 144 MMOL/L
SPECIFIC GRAVITY URINE: 1.02
TRIGL SERPL-MCNC: 56 MG/DL
TSH SERPL-ACNC: 0.03 UIU/ML
UROBILINOGEN URINE: NORMAL
WBC # FLD AUTO: 5.14 K/UL

## 2020-04-07 ENCOUNTER — RX RENEWAL (OUTPATIENT)
Age: 80
End: 2020-04-07

## 2020-04-08 ENCOUNTER — RX RENEWAL (OUTPATIENT)
Age: 80
End: 2020-04-08

## 2020-05-04 ENCOUNTER — RX RENEWAL (OUTPATIENT)
Age: 80
End: 2020-05-04

## 2020-06-10 ENCOUNTER — RX RENEWAL (OUTPATIENT)
Age: 80
End: 2020-06-10

## 2020-09-09 ENCOUNTER — RX RENEWAL (OUTPATIENT)
Age: 80
End: 2020-09-09

## 2020-11-02 ENCOUNTER — APPOINTMENT (OUTPATIENT)
Dept: FAMILY MEDICINE | Facility: CLINIC | Age: 80
End: 2020-11-02
Payer: MEDICARE

## 2020-11-02 VITALS
HEART RATE: 65 BPM | HEIGHT: 65 IN | SYSTOLIC BLOOD PRESSURE: 118 MMHG | RESPIRATION RATE: 16 BRPM | OXYGEN SATURATION: 98 % | TEMPERATURE: 97.7 F | DIASTOLIC BLOOD PRESSURE: 82 MMHG | WEIGHT: 147.56 LBS | BODY MASS INDEX: 24.58 KG/M2

## 2020-11-02 DIAGNOSIS — Z23 ENCOUNTER FOR IMMUNIZATION: ICD-10-CM

## 2020-11-02 PROCEDURE — G0439: CPT

## 2020-11-02 PROCEDURE — G0008: CPT

## 2020-11-02 PROCEDURE — 90662 IIV NO PRSV INCREASED AG IM: CPT

## 2020-11-02 NOTE — HEALTH RISK ASSESSMENT
[Good] : ~his/her~  mood as  good [No] : In the past 12 months have you used drugs other than those required for medical reasons? No [No falls in past year] : Patient reported no falls in the past year [0] : 2) Feeling down, depressed, or hopeless: Not at all (0) [Patient declined mammogram] : Patient declined mammogram [Patient declined PAP Smear] : Patient declined PAP Smear [Patient declined bone density test] : Patient declined bone density test [Patient declined colonoscopy] : Patient declined colonoscopy [HIV test declined] : HIV test declined [Hepatitis C test declined] : Hepatitis C test declined [None] : None [With Family] : lives with family [Employed] : employed [] :  [Feels Safe at Home] : Feels safe at home [Fully functional (bathing, dressing, toileting, transferring, walking, feeding)] : Fully functional (bathing, dressing, toileting, transferring, walking, feeding) [Fully functional (using the telephone, shopping, preparing meals, housekeeping, doing laundry, using] : Fully functional and needs no help or supervision to perform IADLs (using the telephone, shopping, preparing meals, housekeeping, doing laundry, using transportation, managing medications and managing finances) [Smoke Detector] : smoke detector [Carbon Monoxide Detector] : carbon monoxide detector [Seat Belt] :  uses seat belt [Designated Healthcare Proxy] : Designated healthcare proxy [Name: ___] : Health Care Proxy's Name: [unfilled]  [Relationship: ___] : Relationship: [unfilled] [I will adhere to the patient's wishes as expressed in the advance directive except as noted below.] : I will adhere to the patient's wishes as expressed in the advance directive except as noted below [] : No [de-identified] : active [de-identified] : balanced [Change in mental status noted] : No change in mental status noted [Language] : denies difficulty with language [Behavior] : denies difficulty with behavior [Handling Complex Tasks] : denies difficulty handling complex tasks [Reasoning] : denies difficulty with reasoning [Reports changes in hearing] : Reports no changes in hearing [Reports changes in vision] : Reports no changes in vision [Reports changes in dental health] : Reports no changes in dental health [MammogramComments] : stopped [PapSmearComments] : stopped [BoneDensityComments] : yrs ago [ColonoscopyComments] : never [AdvancecareDate] : 11/02/20

## 2020-11-15 LAB
ALBUMIN SERPL ELPH-MCNC: 4.1 G/DL
ALP BLD-CCNC: 69 U/L
ALT SERPL-CCNC: 5 U/L
ANION GAP SERPL CALC-SCNC: 9 MMOL/L
AST SERPL-CCNC: 15 U/L
BASOPHILS # BLD AUTO: 0.03 K/UL
BASOPHILS NFR BLD AUTO: 0.6 %
BILIRUB SERPL-MCNC: 0.3 MG/DL
BUN SERPL-MCNC: 20 MG/DL
CALCIUM SERPL-MCNC: 9.2 MG/DL
CHLORIDE SERPL-SCNC: 104 MMOL/L
CHOLEST SERPL-MCNC: 122 MG/DL
CO2 SERPL-SCNC: 29 MMOL/L
CREAT SERPL-MCNC: 1.01 MG/DL
CREAT SPEC-SCNC: 91 MG/DL
EOSINOPHIL # BLD AUTO: 0.31 K/UL
EOSINOPHIL NFR BLD AUTO: 6.3 %
ESTIMATED AVERAGE GLUCOSE: 174 MG/DL
GLUCOSE SERPL-MCNC: 108 MG/DL
HBA1C MFR BLD HPLC: 7.7 %
HCT VFR BLD CALC: 37.3 %
HDLC SERPL-MCNC: 57 MG/DL
HGB BLD-MCNC: 11.6 G/DL
IMM GRANULOCYTES NFR BLD AUTO: 0.2 %
LDLC SERPL CALC-MCNC: 54 MG/DL
LYMPHOCYTES # BLD AUTO: 1.48 K/UL
LYMPHOCYTES NFR BLD AUTO: 30 %
MAN DIFF?: NORMAL
MCHC RBC-ENTMCNC: 29.5 PG
MCHC RBC-ENTMCNC: 31.1 GM/DL
MCV RBC AUTO: 94.9 FL
MICROALBUMIN 24H UR DL<=1MG/L-MCNC: <1.2 MG/DL
MICROALBUMIN/CREAT 24H UR-RTO: NORMAL MG/G
MONOCYTES # BLD AUTO: 0.48 K/UL
MONOCYTES NFR BLD AUTO: 9.7 %
NEUTROPHILS # BLD AUTO: 2.62 K/UL
NEUTROPHILS NFR BLD AUTO: 53.2 %
NONHDLC SERPL-MCNC: 65 MG/DL
PLATELET # BLD AUTO: 173 K/UL
POTASSIUM SERPL-SCNC: 4.8 MMOL/L
PROT SERPL-MCNC: 6.4 G/DL
RBC # BLD: 3.93 M/UL
RBC # FLD: 12.4 %
SODIUM SERPL-SCNC: 143 MMOL/L
TRIGL SERPL-MCNC: 53 MG/DL
TSH SERPL-ACNC: 1.16 UIU/ML
WBC # FLD AUTO: 4.93 K/UL

## 2020-12-15 ENCOUNTER — RX RENEWAL (OUTPATIENT)
Age: 80
End: 2020-12-15

## 2021-03-22 ENCOUNTER — RX RENEWAL (OUTPATIENT)
Age: 81
End: 2021-03-22

## 2021-04-12 ENCOUNTER — RX RENEWAL (OUTPATIENT)
Age: 81
End: 2021-04-12

## 2021-05-25 NOTE — ASSESSMENT
[FreeTextEntry1] :  We'll be sent for routine laboratory. her blood pressure slightly elevated today she will revisit in several months for followup on her blood pressure and her diabetes 25-May-2021 15:23

## 2021-05-28 ENCOUNTER — APPOINTMENT (OUTPATIENT)
Dept: FAMILY MEDICINE | Facility: CLINIC | Age: 81
End: 2021-05-28
Payer: MEDICARE

## 2021-05-28 VITALS
BODY MASS INDEX: 24.17 KG/M2 | HEIGHT: 65 IN | WEIGHT: 145.06 LBS | RESPIRATION RATE: 16 BRPM | DIASTOLIC BLOOD PRESSURE: 80 MMHG | SYSTOLIC BLOOD PRESSURE: 192 MMHG | OXYGEN SATURATION: 95 % | TEMPERATURE: 97.1 F | HEART RATE: 75 BPM

## 2021-05-28 VITALS — DIASTOLIC BLOOD PRESSURE: 78 MMHG | SYSTOLIC BLOOD PRESSURE: 146 MMHG | OXYGEN SATURATION: 96 %

## 2021-05-28 DIAGNOSIS — J06.9 ACUTE UPPER RESPIRATORY INFECTION, UNSPECIFIED: ICD-10-CM

## 2021-05-28 PROCEDURE — 99214 OFFICE O/P EST MOD 30 MIN: CPT

## 2021-05-28 RX ORDER — FLUTICASONE PROPIONATE 50 UG/1
50 SPRAY, METERED NASAL DAILY
Qty: 1 | Refills: 2 | Status: ACTIVE | COMMUNITY
Start: 2021-05-28 | End: 1900-01-01

## 2021-05-28 NOTE — PHYSICAL EXAM
[Normal Sclera/Conjunctiva] : normal sclera/conjunctiva [Normal Outer Ear/Nose] : the outer ears and nose were normal in appearance [Normal Oropharynx] : the oropharynx was normal [No Respiratory Distress] : no respiratory distress  [No Accessory Muscle Use] : no accessory muscle use [Normal Gait] : normal gait [Normal] : affect was normal and insight and judgment were intact [de-identified] : cannot visualize TMs cerumen b/l [de-identified] : Wheeze to RUL otherwise CTA

## 2021-05-28 NOTE — HISTORY OF PRESENT ILLNESS
[FreeTextEntry1] : cough [de-identified] : Pt reports she gets cough every year same time, she reports no fever. She works at schools, she has not been able to get covid vaccine yet. She reports sneezing, itchy eyes for past two weeks. Patient reports sore throat starting today. Patient denies any sob, chest pain, palpitations, . Pt reports that her symptoms have gotten worse. Patient had flu shot this year. She denies any sick contacts. Pt reports taking generic robitussin, reports unsure if it helped. Pt is not a smoker. Patient reports she is eating and drinking okay. She has hx of throat cancer. She denies any current dysphagia. Reports fatigue and nasal congestion. Patient reports coughing up loose white mucus. \par \par Denies body aches, night sweats, fevers, chills, SOB, cough, ear pain, N/V/D/C, congestion, loss of appetite, sore throat, headaches, or dizziness. No other health concerns today.\par \par Of note, patient is here with daughter today who is very stressed ,lost  to MI last night.

## 2021-05-28 NOTE — PLAN
[FreeTextEntry1] : \par VSS, no dyspnea/sob. We will give patient otc medications to help symptoms. She declines covid pcr test at this time. She will go to the ED with any emergency signs including but not limited to fevers, sob, dysphagia, etc. She will call on call doctor if she needs anything. Patient will return to office next week for follow up. Advised patient to call with any questions or concerns. Patient verbalized understanding.

## 2021-06-04 ENCOUNTER — APPOINTMENT (OUTPATIENT)
Dept: FAMILY MEDICINE | Facility: CLINIC | Age: 81
End: 2021-06-04
Payer: MEDICARE

## 2021-06-04 VITALS
HEART RATE: 65 BPM | HEIGHT: 65 IN | RESPIRATION RATE: 16 BRPM | DIASTOLIC BLOOD PRESSURE: 82 MMHG | WEIGHT: 145 LBS | OXYGEN SATURATION: 94 % | TEMPERATURE: 98 F | SYSTOLIC BLOOD PRESSURE: 132 MMHG | BODY MASS INDEX: 24.16 KG/M2

## 2021-06-04 PROCEDURE — 99213 OFFICE O/P EST LOW 20 MIN: CPT

## 2021-06-04 NOTE — ASSESSMENT
[FreeTextEntry1] : In general patient seems to doing well she has no new complaints she seems to be coping with the sudden death of her son-in-law although this has been a stressful situation she continues to take her current medications she had a situation with nasal congestion and general stuffiness last week which seems to be responding to antihistamines she will be sent for routine laboratory work and followed up after those results are available to us medications will remain unaltered

## 2021-06-04 NOTE — HISTORY OF PRESENT ILLNESS
[FreeTextEntry1] : Beatties hypothyroidism hypertension [de-identified] : Patient here for follow-up on the above issues unfortunately last week her son-in-law in his 60s  suddenly at home and she now is in a situation with her daughter and grand child with is a great deal of stress at home she feels she is coping reasonably well review of systems is unremarkable she has not had laboratory work done for approximately a year 8 months with regard to her diabetes X her sugars at home and she states they run in the 130s 140s area she has not had any hypoglycemic reactions

## 2021-06-04 NOTE — PHYSICAL EXAM
[No Acute Distress] : no acute distress [Well Nourished] : well nourished [Well Developed] : well developed [Normal Sclera/Conjunctiva] : normal sclera/conjunctiva [PERRL] : pupils equal round and reactive to light [EOMI] : extraocular movements intact [Normal Outer Ear/Nose] : the outer ears and nose were normal in appearance [Normal Oropharynx] : the oropharynx was normal [Normal TMs] : both tympanic membranes were normal [No JVD] : no jugular venous distention [No Lymphadenopathy] : no lymphadenopathy [Thyroid Normal, No Nodules] : the thyroid was normal and there were no nodules present [No Respiratory Distress] : no respiratory distress  [Clear to Auscultation] : lungs were clear to auscultation bilaterally [Normal Rate] : normal rate  [Regular Rhythm] : with a regular rhythm [No Murmur] : no murmur heard [No Edema] : there was no peripheral edema [Soft] : abdomen soft [Non Tender] : non-tender [No HSM] : no HSM [Normal Supraclavicular Nodes] : no supraclavicular lymphadenopathy [Normal Posterior Cervical Nodes] : no posterior cervical lymphadenopathy [Normal Anterior Cervical Nodes] : no anterior cervical lymphadenopathy [No CVA Tenderness] : no CVA  tenderness [No Spinal Tenderness] : no spinal tenderness [Coordination Grossly Intact] : coordination grossly intact [No Focal Deficits] : no focal deficits [Normal Gait] : normal gait [Normal Mood] : the mood was normal

## 2021-06-14 LAB
25(OH)D3 SERPL-MCNC: 28.6 NG/ML
ALBUMIN SERPL ELPH-MCNC: 4.1 G/DL
ALP BLD-CCNC: 76 U/L
ALT SERPL-CCNC: 8 U/L
ANION GAP SERPL CALC-SCNC: 12 MMOL/L
AST SERPL-CCNC: 15 U/L
BASOPHILS # BLD AUTO: 0.04 K/UL
BASOPHILS NFR BLD AUTO: 0.7 %
BILIRUB SERPL-MCNC: 0.2 MG/DL
BUN SERPL-MCNC: 31 MG/DL
CALCIUM SERPL-MCNC: 9.4 MG/DL
CHLORIDE SERPL-SCNC: 104 MMOL/L
CHOLEST SERPL-MCNC: 128 MG/DL
CO2 SERPL-SCNC: 27 MMOL/L
CREAT SERPL-MCNC: 1.03 MG/DL
CREAT SPEC-SCNC: 114 MG/DL
EOSINOPHIL # BLD AUTO: 0.21 K/UL
EOSINOPHIL NFR BLD AUTO: 3.6 %
ESTIMATED AVERAGE GLUCOSE: 183 MG/DL
FERRITIN SERPL-MCNC: 42 NG/ML
FOLATE SERPL-MCNC: 5.7 NG/ML
GLUCOSE SERPL-MCNC: 121 MG/DL
HBA1C MFR BLD HPLC: 8 %
HCT VFR BLD CALC: 36.1 %
HDLC SERPL-MCNC: 53 MG/DL
HGB BLD-MCNC: 11.2 G/DL
IMM GRANULOCYTES NFR BLD AUTO: 0.3 %
LDLC SERPL CALC-MCNC: 64 MG/DL
LYMPHOCYTES # BLD AUTO: 1.46 K/UL
LYMPHOCYTES NFR BLD AUTO: 25.3 %
MAN DIFF?: NORMAL
MCHC RBC-ENTMCNC: 29.6 PG
MCHC RBC-ENTMCNC: 31 GM/DL
MCV RBC AUTO: 95.5 FL
MICROALBUMIN 24H UR DL<=1MG/L-MCNC: <1.2 MG/DL
MICROALBUMIN/CREAT 24H UR-RTO: NORMAL MG/G
MONOCYTES # BLD AUTO: 0.6 K/UL
MONOCYTES NFR BLD AUTO: 10.4 %
NEUTROPHILS # BLD AUTO: 3.43 K/UL
NEUTROPHILS NFR BLD AUTO: 59.7 %
NONHDLC SERPL-MCNC: 75 MG/DL
PLATELET # BLD AUTO: 194 K/UL
POTASSIUM SERPL-SCNC: 4.9 MMOL/L
PROT SERPL-MCNC: 6.5 G/DL
RBC # BLD: 3.78 M/UL
RBC # FLD: 12.9 %
SODIUM SERPL-SCNC: 143 MMOL/L
TRIGL SERPL-MCNC: 53 MG/DL
TSH SERPL-ACNC: 2.87 UIU/ML
VIT B12 SERPL-MCNC: 496 PG/ML
WBC # FLD AUTO: 5.76 K/UL

## 2021-06-21 ENCOUNTER — RX RENEWAL (OUTPATIENT)
Age: 81
End: 2021-06-21

## 2021-08-16 ENCOUNTER — RX RENEWAL (OUTPATIENT)
Age: 81
End: 2021-08-16

## 2021-09-20 ENCOUNTER — RX RENEWAL (OUTPATIENT)
Age: 81
End: 2021-09-20

## 2021-09-27 ENCOUNTER — TRANSCRIPTION ENCOUNTER (OUTPATIENT)
Age: 81
End: 2021-09-27

## 2021-11-08 ENCOUNTER — RX RENEWAL (OUTPATIENT)
Age: 81
End: 2021-11-08

## 2021-11-15 ENCOUNTER — RX RENEWAL (OUTPATIENT)
Age: 81
End: 2021-11-15

## 2021-12-20 ENCOUNTER — RX RENEWAL (OUTPATIENT)
Age: 81
End: 2021-12-20

## 2021-12-29 ENCOUNTER — RX RENEWAL (OUTPATIENT)
Age: 81
End: 2021-12-29

## 2022-01-14 ENCOUNTER — INPATIENT (INPATIENT)
Facility: HOSPITAL | Age: 82
LOS: 0 days | Discharge: HOME | DRG: 177 | End: 2022-01-14
Attending: HOSPITALIST | Admitting: HOSPITALIST
Payer: MEDICARE

## 2022-01-14 VITALS
DIASTOLIC BLOOD PRESSURE: 55 MMHG | HEART RATE: 85 BPM | SYSTOLIC BLOOD PRESSURE: 116 MMHG | OXYGEN SATURATION: 90 % | WEIGHT: 143.08 LBS | TEMPERATURE: 99 F | RESPIRATION RATE: 18 BRPM | HEIGHT: 63.5 IN

## 2022-01-14 VITALS — TEMPERATURE: 101 F

## 2022-01-14 DIAGNOSIS — Z41.9 ENCOUNTER FOR PROCEDURE FOR PURPOSES OTHER THAN REMEDYING HEALTH STATE, UNSPECIFIED: Chronic | ICD-10-CM

## 2022-01-14 DIAGNOSIS — U07.1 COVID-19: ICD-10-CM

## 2022-01-14 DIAGNOSIS — Z90.49 ACQUIRED ABSENCE OF OTHER SPECIFIED PARTS OF DIGESTIVE TRACT: Chronic | ICD-10-CM

## 2022-01-14 LAB
ALBUMIN SERPL ELPH-MCNC: 3.2 G/DL — LOW (ref 3.3–5)
ALP SERPL-CCNC: 56 U/L — SIGNIFICANT CHANGE UP (ref 40–120)
ALT FLD-CCNC: 19 U/L — SIGNIFICANT CHANGE UP (ref 10–45)
ANION GAP SERPL CALC-SCNC: 10 MMOL/L — SIGNIFICANT CHANGE UP (ref 5–17)
AST SERPL-CCNC: 26 U/L — SIGNIFICANT CHANGE UP (ref 10–40)
BASOPHILS # BLD AUTO: 0.01 K/UL — SIGNIFICANT CHANGE UP (ref 0–0.2)
BASOPHILS NFR BLD AUTO: 0.1 % — SIGNIFICANT CHANGE UP (ref 0–2)
BILIRUB SERPL-MCNC: 0.5 MG/DL — SIGNIFICANT CHANGE UP (ref 0.2–1.2)
BUN SERPL-MCNC: 32 MG/DL — HIGH (ref 7–23)
CALCIUM SERPL-MCNC: 8.3 MG/DL — LOW (ref 8.4–10.5)
CHLORIDE SERPL-SCNC: 97 MMOL/L — SIGNIFICANT CHANGE UP (ref 96–108)
CO2 SERPL-SCNC: 27 MMOL/L — SIGNIFICANT CHANGE UP (ref 22–31)
CREAT SERPL-MCNC: 1.31 MG/DL — HIGH (ref 0.5–1.3)
CRP SERPL-MCNC: 56 MG/L — HIGH
D DIMER BLD IA.RAPID-MCNC: 411 NG/ML DDU — HIGH
EOSINOPHIL # BLD AUTO: 0.15 K/UL — SIGNIFICANT CHANGE UP (ref 0–0.5)
EOSINOPHIL NFR BLD AUTO: 2 % — SIGNIFICANT CHANGE UP (ref 0–6)
FERRITIN SERPL-MCNC: 243 NG/ML — HIGH (ref 15–150)
GLUCOSE SERPL-MCNC: 200 MG/DL — HIGH (ref 70–99)
HCT VFR BLD CALC: 34.7 % — SIGNIFICANT CHANGE UP (ref 34.5–45)
HGB BLD-MCNC: 11.3 G/DL — LOW (ref 11.5–15.5)
IMM GRANULOCYTES NFR BLD AUTO: 0.5 % — SIGNIFICANT CHANGE UP (ref 0–1.5)
LACTATE SERPL-SCNC: 1.3 MMOL/L — SIGNIFICANT CHANGE UP (ref 0.7–2)
LYMPHOCYTES # BLD AUTO: 0.51 K/UL — LOW (ref 1–3.3)
LYMPHOCYTES # BLD AUTO: 6.9 % — LOW (ref 13–44)
MCHC RBC-ENTMCNC: 29.7 PG — SIGNIFICANT CHANGE UP (ref 27–34)
MCHC RBC-ENTMCNC: 32.6 GM/DL — SIGNIFICANT CHANGE UP (ref 32–36)
MCV RBC AUTO: 91.3 FL — SIGNIFICANT CHANGE UP (ref 80–100)
MONOCYTES # BLD AUTO: 0.56 K/UL — SIGNIFICANT CHANGE UP (ref 0–0.9)
MONOCYTES NFR BLD AUTO: 7.6 % — SIGNIFICANT CHANGE UP (ref 2–14)
NEUTROPHILS # BLD AUTO: 6.08 K/UL — SIGNIFICANT CHANGE UP (ref 1.8–7.4)
NEUTROPHILS NFR BLD AUTO: 82.9 % — HIGH (ref 43–77)
NRBC # BLD: 0 /100 WBCS — SIGNIFICANT CHANGE UP (ref 0–0)
PLATELET # BLD AUTO: 136 K/UL — LOW (ref 150–400)
POTASSIUM SERPL-MCNC: 3.8 MMOL/L — SIGNIFICANT CHANGE UP (ref 3.5–5.3)
POTASSIUM SERPL-SCNC: 3.8 MMOL/L — SIGNIFICANT CHANGE UP (ref 3.5–5.3)
PROCALCITONIN SERPL-MCNC: 0.29 NG/ML — HIGH
PROT SERPL-MCNC: 7.1 G/DL — SIGNIFICANT CHANGE UP (ref 6–8.3)
RBC # BLD: 3.8 M/UL — SIGNIFICANT CHANGE UP (ref 3.8–5.2)
RBC # FLD: 13.2 % — SIGNIFICANT CHANGE UP (ref 10.3–14.5)
SODIUM SERPL-SCNC: 134 MMOL/L — LOW (ref 135–145)
TROPONIN I, HIGH SENSITIVITY RESULT: 23.7 NG/L — SIGNIFICANT CHANGE UP
WBC # BLD: 7.35 K/UL — SIGNIFICANT CHANGE UP (ref 3.8–10.5)
WBC # FLD AUTO: 7.35 K/UL — SIGNIFICANT CHANGE UP (ref 3.8–10.5)

## 2022-01-14 PROCEDURE — 71045 X-RAY EXAM CHEST 1 VIEW: CPT

## 2022-01-14 PROCEDURE — 84484 ASSAY OF TROPONIN QUANT: CPT

## 2022-01-14 PROCEDURE — 99285 EMERGENCY DEPT VISIT HI MDM: CPT

## 2022-01-14 PROCEDURE — 36415 COLL VENOUS BLD VENIPUNCTURE: CPT

## 2022-01-14 PROCEDURE — 84145 PROCALCITONIN (PCT): CPT

## 2022-01-14 PROCEDURE — 86140 C-REACTIVE PROTEIN: CPT

## 2022-01-14 PROCEDURE — 83605 ASSAY OF LACTIC ACID: CPT

## 2022-01-14 PROCEDURE — 96360 HYDRATION IV INFUSION INIT: CPT

## 2022-01-14 PROCEDURE — 80053 COMPREHEN METABOLIC PANEL: CPT

## 2022-01-14 PROCEDURE — 82728 ASSAY OF FERRITIN: CPT

## 2022-01-14 PROCEDURE — 71045 X-RAY EXAM CHEST 1 VIEW: CPT | Mod: 26

## 2022-01-14 PROCEDURE — 93005 ELECTROCARDIOGRAM TRACING: CPT

## 2022-01-14 PROCEDURE — 71275 CT ANGIOGRAPHY CHEST: CPT | Mod: 26,MA

## 2022-01-14 PROCEDURE — 85379 FIBRIN DEGRADATION QUANT: CPT

## 2022-01-14 PROCEDURE — 85025 COMPLETE CBC W/AUTO DIFF WBC: CPT

## 2022-01-14 PROCEDURE — 71275 CT ANGIOGRAPHY CHEST: CPT | Mod: MA

## 2022-01-14 PROCEDURE — 99284 EMERGENCY DEPT VISIT MOD MDM: CPT | Mod: CS

## 2022-01-14 RX ORDER — SODIUM CHLORIDE 9 MG/ML
1000 INJECTION INTRAMUSCULAR; INTRAVENOUS; SUBCUTANEOUS ONCE
Refills: 0 | Status: COMPLETED | OUTPATIENT
Start: 2022-01-14 | End: 2022-01-14

## 2022-01-14 RX ORDER — ACETAMINOPHEN 500 MG
650 TABLET ORAL ONCE
Refills: 0 | Status: COMPLETED | OUTPATIENT
Start: 2022-01-14 | End: 2022-01-14

## 2022-01-14 RX ADMIN — SODIUM CHLORIDE 1000 MILLILITER(S): 9 INJECTION INTRAMUSCULAR; INTRAVENOUS; SUBCUTANEOUS at 10:10

## 2022-01-14 RX ADMIN — Medication 650 MILLIGRAM(S): at 09:55

## 2022-01-14 RX ADMIN — SODIUM CHLORIDE 1000 MILLILITER(S): 9 INJECTION INTRAMUSCULAR; INTRAVENOUS; SUBCUTANEOUS at 09:28

## 2022-01-14 RX ADMIN — Medication 650 MILLIGRAM(S): at 13:10

## 2022-01-14 NOTE — ED PROVIDER NOTE - OBJECTIVE STATEMENT
82 yo female biba from home for generalized weakness, pt has recent diagnosis of covid, states she has been sick for about 1 week. not immunocompromised, pt is vaccinated. Pt states for the last 2 days she hasx felt terrible. on orrival pt was requring oxygen

## 2022-01-14 NOTE — ED ADULT NURSE NOTE - NSICDXPASTSURGICALHX_GEN_ALL_CORE_FT
PAST SURGICAL HISTORY:  Elective surgery s/p prolapse bladder repair    History of appendectomy

## 2022-01-14 NOTE — CHART NOTE - NSCHARTNOTEFT_GEN_A_CORE
SW consulted to assist patient with outpatient treatment of COVID.  As per MD, patient has diagnosis of bi-lateral pneumonia due to COVID 19. YOUSIF informed by MD that patient would benefit for outpatient infusion of remdesivir if eligible.  SW met with patient at bedside.  Patient reports living in private home with her daughter and grandson.  Patient reports being independent, no assistance needed & no recent falls. Daughter can assist as needed. Patient agreeable to [facility] program if eligible.  YOUSIF called [phone] and spoke with representative Jonathan. YOUSIF provided patient information - YOUSIF informed that information would be passed to [facility] team nurse and contact would be made to patient to discuss eligibility.  YOUSIF also emailed [email or web address]; received response from Gita Lisker who advised to have MD send email with requested information.   YOUSIF informed MD Velazco to email needed information to complete referral. YOUSIF also informed MD patient needs COVID swab.   SW to follow if further assistance is needed.

## 2022-01-14 NOTE — ED PROVIDER NOTE - PATIENT PORTAL LINK FT
You can access the FollowMyHealth Patient Portal offered by [facility] by registering at the following website: [email or web address]. By joining [facility]’s FollowMyHealth portal, you will also be able to view your health information using other applications (apps) compatible with our system.

## 2022-01-14 NOTE — ED PROVIDER NOTE - NSFOLLOWUPINSTRUCTIONS_ED_ALL_ED_FT
COVID-19 (Coronavirus Disease 2019)    WHAT YOU NEED TO KNOW:    COVID-19 is the disease caused by a coronavirus first discovered in December 2019. Coronaviruses generally cause upper respiratory (nose, throat, and lung) infections, such as a cold. The 2019 virus spreads quickly and easily. It can be spread starting 2 to 3 days before symptoms even begin.    DISCHARGE INSTRUCTIONS:    Call your local emergency number (911 in the ) if:   •You have trouble breathing or shortness of breath at rest.      •You have chest pain or pressure that lasts longer than 5 minutes.      •You become confused or hard to wake.      •Your lips or face are blue.      Return to the emergency department if:   •You have a fever of 104°F (40°C) or higher.          Call your doctor if:   •You have symptoms of COVID-19.      •You have questions or concerns about your condition or care.      Medicines: You may need any of the following:   •Decongestants help reduce nasal congestion and help you breathe more easily. If you take decongestant pills, they may make you feel restless or cause problems with your sleep. Do not use decongestant sprays for more than a few days.      •Cough suppressants help reduce coughing. Ask your healthcare provider which type of cough medicine is best for you.      •Acetaminophen decreases pain and fever. It is available without a doctor's order. Ask how much to take and how often to take it. Follow directions. Read the labels of all other medicines you are using to see if they also contain acetaminophen, or ask your doctor or pharmacist. Acetaminophen can cause liver damage if not taken correctly. Do not use more than 4 grams (4,000 milligrams) total of acetaminophen in one day.       •NSAIDs, such as ibuprofen, help decrease swelling, pain, and fever. This medicine is available with or without a doctor's order. NSAIDs can cause stomach bleeding or kidney problems in certain people. If you take blood thinner medicine, always ask your healthcare provider if NSAIDs are safe for you. Always read the medicine label and follow directions.      •Blood thinners help prevent blood clots. Clots can cause strokes, heart attacks, and death. The following are general safety guidelines to follow while you are taking a blood thinner:?Watch for bleeding and bruising while you take blood thinners. Watch for bleeding from your gums or nose. Watch for blood in your urine and bowel movements. Use a soft washcloth on your skin, and a soft toothbrush to brush your teeth. This can keep your skin and gums from bleeding. If you shave, use an electric shaver. Do not play contact sports.       ?Tell your dentist and other healthcare providers that you take a blood thinner. Wear a bracelet or necklace that says you take this medicine.       ?Do not start or stop any other medicines unless your healthcare provider tells you to. Many medicines cannot be used with blood thinners.      ?Take your blood thinner exactly as prescribed by your healthcare provider. Do not skip does or take less than prescribed. Tell your provider right away if you forget to take your blood thinner, or if you take too much.      ?Warfarin is a blood thinner that you may need to take. The following are things you should be aware of if you take warfarin: ?Foods and medicines can affect the amount of warfarin in your blood. Do not make major changes to your diet while you take warfarin. Warfarin works best when you eat about the same amount of vitamin K every day. Vitamin K is found in green leafy vegetables and certain other foods. Ask for more information about what to eat when you are taking warfarin.      ?You will need to see your healthcare provider for follow-up visits when you are on warfarin. You will need regular blood tests. These tests are used to decide how much medicine you need.         •Take your medicine as directed. Contact your healthcare provider if you think your medicine is not helping or if you have side effects. Tell him or her if you are allergic to any medicine. Keep a list of the medicines, vitamins, and herbs you take. Include the amounts, and when and why you take them. Bring the list or the pill bottles to follow-up visits. Carry your medicine list with you in case of an emergency.      What you need to know about variants: The virus has changed into several new forms (called variants) since it was discovered. The variants may be more contagious (easily spread) than the original form. Some may also cause more severe illness than others.    What you need to know about COVID-19 vaccines: Healthcare providers recommend a COVID-19 vaccine, even if you have already had COVID-19. You are considered fully vaccinated against COVID-19 two weeks after the final dose of any COVID-19 vaccine. Let your healthcare provider know when you have received the final dose of the vaccine. Make a copy of your vaccination card. Keep the original with you in case you need to show it. Keep the copy in a safe place.  •COVID-19 vaccines are given as a shot in 1 or 2 doses.   Vaccination is recommended for everyone 5 years or older. One 2-dose vaccine is fully approved for those 16 or older. This vaccine also has an emergency use authorization (EUA) for children 5 to 15 years old. No vaccine is currently available for children younger than 5 years. An additional (booster) dose is also recommended for all adults 18 or older. The booster can be a different brand of the COVID-19 vaccine than you originally received. The timing for the booster depends on the type of vaccine you received:?1-dose vaccine: The booster is given at least 2 months after you received the vaccine.      ?2-dose vaccine: The booster is given at least 6 months after the second dose.    COVID-19 Immunization Schedule         •Continue social distancing and other measures, even after you get the vaccine. Although it is not common, you can become infected after you get the vaccine. You may also be able to pass the virus to others without knowing you are infected.      •After you get the vaccine, check local, national, and international travel rules. You may need to be tested before you travel. Some countries require proof of a negative test before you travel. You may also need to quarantine after you return.      How the 2019 coronavirus spreads:   •Droplets are the main way all coronaviruses spread. The virus travels in droplets that form when a person talks, sings, coughs, or sneezes. The droplets can also float in the air for minutes or hours. Infection happens when you breathe in the droplets or get them in your eyes or nose. Close personal contact with an infected person increases your risk for infection. This means being within 6 feet (2 meters) of the person for at least 15 minutes over 24 hours.      •Person-to-person contact can spread the virus. For example, a person with the virus on his or her hands can spread it by shaking hands with someone.      •The virus can stay on objects and surfaces for up to 3 days. You may become infected by touching the object or surface and then touching your eyes or mouth.      Help lower the risk for COVID-19:   •Wash your hands often throughout the day. Use soap and water. Rub your soapy hands together, lacing your fingers, for at least 20 seconds. Rinse with warm, running water. Dry your hands with a clean towel or paper towel. Use hand  that contains alcohol if soap and water are not available. Teach children how to wash their hands and use hand .  Handwashing           •Cover sneezes and coughs. Turn your face away and cover your mouth and nose with a tissue. Throw the tissue away. Use the bend of your arm if a tissue is not available. Then wash your hands well with soap and water or use hand . Teach children how to cover a cough or sneeze.      •Wear a face covering (mask) when needed. Use a cloth covering with at least 2 layers. You can also create layers by putting a cloth covering over a disposable non-medical mask. Cover your mouth and your nose.  How to Wear a Face Covering (Mask)           •Follow worldwide, national, and local social distancing guidelines. Keep at least 6 feet (2 meters) between you and others.      •Try not to touch your face. If you get the virus on your hands, you can transfer it to your eyes, nose, or mouth and become infected. You can also transfer it to objects, surfaces, or people.      •Clean and disinfect high-touch surfaces and objects often. Use disinfecting wipes, or make a solution of 4 teaspoons of bleach in 1 quart (4 cups) of water.      •Ask about other vaccines you may need. Get the influenza (flu) vaccine as soon as recommended each year, usually starting in September or October. Get the pneumonia vaccine if recommended. Your healthcare provider can tell you if you should also get other vaccines, and when to get them.    Prevent COVID-19 Infection         Follow social distancing guidelines: National and local social distancing rules vary. Rules and restrictions may change over time as restrictions are lifted. The following are general guidelines:   •Stay home if you are sick or think you may have COVID-19. It is important to stay home if you are waiting for a testing appointment or for test results.      •Avoid close physical contact with anyone who does not live in your home. Do not shake hands with, hug, or kiss a person as a greeting. If you must use public transportation (such as a bus or subway), try to sit or stand away from others. Wear your face covering.      •Avoid in-person gatherings and crowds. Attend virtually if possible.      Follow up with your doctor as directed: Write down your questions so you remember to ask them during your visits.    For more information:   •Centers for Disease Control and Prevention  [address] [place] [address]  Phone: 1-985.271.9586  Web Address: http://www.cdc.gov           © Copyright [employer] 2022           back to top                          © Copyright [employer] 2022

## 2022-01-14 NOTE — ED ADULT NURSE NOTE - NSIMPLEMENTINTERV_GEN_ALL_ED
Implemented All Fall with Harm Risk Interventions:  [place] to call system. Call bell, personal items and telephone within reach. Instruct patient to call for assistance. Room bathroom lighting operational. Non-slip footwear when patient is off stretcher. Physically safe environment: no spills, clutter or unnecessary equipment. Stretcher in lowest position, wheels locked, appropriate side rails in place. Provide visual cue, wrist band, yellow gown, etc. Monitor gait and stability. Monitor for mental status changes and reorient to person, place, and time. Review medications for side effects contributing to fall risk. Reinforce activity limits and safety measures with patient and family. Provide visual clues: red socks.

## 2022-01-14 NOTE — ED PROVIDER NOTE - PHYSICAL EXAMINATION
Gen:  Well appearing in NAD  Head:  NC/AT  HEENT: pupils perrl,no pharyngeal erythema, uvula midline  Cardiac: S1S2, RRR  Abd: Soft, non tender  Resp: No distress, CTA   musculoskeletal:: no deformities, no swelling, strength +5/+5  Skin: warm and dry as visualized, no rashes  Neuro: MARION, , aao x 4  Psych:alert, cooperative, appropriate mood and affect for situation

## 2022-01-14 NOTE — ED PROVIDER NOTE - PROGRESS NOTE DETAILS
cta negative for pe, bilateral covid pneumonia with huypoxia, tba spoke with Dr. Brie Neal DO pt now 97 to 100% saturated on room air, will mao Neal DO pt now 97 to 100% saturated on room air, will d/c after dw Dr. Baird. pt with initial hypoxia, made aware of [facility] program by sw, uncertain if pt meets criteria. NADIYA Neal DO pt now 97 to 100% saturated on room air, will d/c after dw Dr. Baird. pt with initial hypoxia, made aware of [facility] program by sw, uncertain if pt meets criteria for home remdesivir as oxygen saturation is 94% for criteria or less for remdesivir and pt is now 97 to 100%. NADIYA Neal DO

## 2022-01-14 NOTE — ED ADULT NURSE NOTE - OBJECTIVE STATEMENT
81 yr old female BIB EMS from home for c/o weakness, body aches and fevers. Pt A&Ox3 states she tested + Covid. Pt denies chest pain, dizziness, nausea, vomiting. PMHX: HTN, DM, Hyperlipidemia.

## 2022-01-14 NOTE — ED PROVIDER NOTE - CLINICAL SUMMARY MEDICAL DECISION MAKING FREE TEXT BOX
pt with covid, weak, hypoxic, supplemental oxygen, hydration, tylenol labs including d dimer re evaluate possible admission

## 2022-01-14 NOTE — ED ADULT TRIAGE NOTE - CHIEF COMPLAINT QUOTE
Covid + c/o generalized weakness and cough Covid + c/o generalized weakness and cough  ISAR negative

## 2022-01-17 ENCOUNTER — NON-APPOINTMENT (OUTPATIENT)
Age: 82
End: 2022-01-17

## 2022-02-08 ENCOUNTER — RX RENEWAL (OUTPATIENT)
Age: 82
End: 2022-02-08

## 2022-02-22 ENCOUNTER — INPATIENT (INPATIENT)
Facility: HOSPITAL | Age: 82
LOS: 2 days | Discharge: HOME | DRG: 638 | End: 2022-02-25
Attending: STUDENT IN AN ORGANIZED HEALTH CARE EDUCATION/TRAINING PROGRAM | Admitting: STUDENT IN AN ORGANIZED HEALTH CARE EDUCATION/TRAINING PROGRAM
Payer: MEDICARE

## 2022-02-22 VITALS
WEIGHT: 143.08 LBS | DIASTOLIC BLOOD PRESSURE: 58 MMHG | TEMPERATURE: 98 F | SYSTOLIC BLOOD PRESSURE: 146 MMHG | RESPIRATION RATE: 16 BRPM | HEART RATE: 80 BPM | OXYGEN SATURATION: 99 % | HEIGHT: 63.5 IN

## 2022-02-22 DIAGNOSIS — Z90.49 ACQUIRED ABSENCE OF OTHER SPECIFIED PARTS OF DIGESTIVE TRACT: Chronic | ICD-10-CM

## 2022-02-22 DIAGNOSIS — L03.116 CELLULITIS OF LEFT LOWER LIMB: ICD-10-CM

## 2022-02-22 DIAGNOSIS — Z41.9 ENCOUNTER FOR PROCEDURE FOR PURPOSES OTHER THAN REMEDYING HEALTH STATE, UNSPECIFIED: Chronic | ICD-10-CM

## 2022-02-22 LAB
ALBUMIN SERPL ELPH-MCNC: 3.2 G/DL — LOW (ref 3.3–5)
ALP SERPL-CCNC: 78 U/L — SIGNIFICANT CHANGE UP (ref 40–120)
ALT FLD-CCNC: 17 U/L — SIGNIFICANT CHANGE UP (ref 10–45)
ANION GAP SERPL CALC-SCNC: 11 MMOL/L — SIGNIFICANT CHANGE UP (ref 5–17)
AST SERPL-CCNC: 16 U/L — SIGNIFICANT CHANGE UP (ref 10–40)
BASOPHILS # BLD AUTO: 0.05 K/UL — SIGNIFICANT CHANGE UP (ref 0–0.2)
BASOPHILS NFR BLD AUTO: 0.5 % — SIGNIFICANT CHANGE UP (ref 0–2)
BILIRUB SERPL-MCNC: 0.2 MG/DL — SIGNIFICANT CHANGE UP (ref 0.2–1.2)
BUN SERPL-MCNC: 24 MG/DL — HIGH (ref 7–23)
CALCIUM SERPL-MCNC: 8.6 MG/DL — SIGNIFICANT CHANGE UP (ref 8.4–10.5)
CHLORIDE SERPL-SCNC: 101 MMOL/L — SIGNIFICANT CHANGE UP (ref 96–108)
CO2 SERPL-SCNC: 27 MMOL/L — SIGNIFICANT CHANGE UP (ref 22–31)
CREAT SERPL-MCNC: 1.12 MG/DL — SIGNIFICANT CHANGE UP (ref 0.5–1.3)
EOSINOPHIL # BLD AUTO: 0.08 K/UL — SIGNIFICANT CHANGE UP (ref 0–0.5)
EOSINOPHIL NFR BLD AUTO: 0.9 % — SIGNIFICANT CHANGE UP (ref 0–6)
GLUCOSE BLDC GLUCOMTR-MCNC: 135 MG/DL — HIGH (ref 70–99)
GLUCOSE BLDC GLUCOMTR-MCNC: 157 MG/DL — HIGH (ref 70–99)
GLUCOSE SERPL-MCNC: 145 MG/DL — HIGH (ref 70–99)
HCT VFR BLD CALC: 31.7 % — LOW (ref 34.5–45)
HGB BLD-MCNC: 10 G/DL — LOW (ref 11.5–15.5)
IMM GRANULOCYTES NFR BLD AUTO: 0.3 % — SIGNIFICANT CHANGE UP (ref 0–1.5)
LYMPHOCYTES # BLD AUTO: 1.02 K/UL — SIGNIFICANT CHANGE UP (ref 1–3.3)
LYMPHOCYTES # BLD AUTO: 11 % — LOW (ref 13–44)
MCHC RBC-ENTMCNC: 29.7 PG — SIGNIFICANT CHANGE UP (ref 27–34)
MCHC RBC-ENTMCNC: 31.5 GM/DL — LOW (ref 32–36)
MCV RBC AUTO: 94.1 FL — SIGNIFICANT CHANGE UP (ref 80–100)
MONOCYTES # BLD AUTO: 0.92 K/UL — HIGH (ref 0–0.9)
MONOCYTES NFR BLD AUTO: 9.9 % — SIGNIFICANT CHANGE UP (ref 2–14)
NEUTROPHILS # BLD AUTO: 7.18 K/UL — SIGNIFICANT CHANGE UP (ref 1.8–7.4)
NEUTROPHILS NFR BLD AUTO: 77.4 % — HIGH (ref 43–77)
NRBC # BLD: 0 /100 WBCS — SIGNIFICANT CHANGE UP (ref 0–0)
PLATELET # BLD AUTO: 241 K/UL — SIGNIFICANT CHANGE UP (ref 150–400)
POTASSIUM SERPL-MCNC: 4.5 MMOL/L — SIGNIFICANT CHANGE UP (ref 3.5–5.3)
POTASSIUM SERPL-SCNC: 4.5 MMOL/L — SIGNIFICANT CHANGE UP (ref 3.5–5.3)
PROT SERPL-MCNC: 6.9 G/DL — SIGNIFICANT CHANGE UP (ref 6–8.3)
RBC # BLD: 3.37 M/UL — LOW (ref 3.8–5.2)
RBC # FLD: 13.7 % — SIGNIFICANT CHANGE UP (ref 10.3–14.5)
SARS-COV-2 RNA SPEC QL NAA+PROBE: SIGNIFICANT CHANGE UP
SODIUM SERPL-SCNC: 139 MMOL/L — SIGNIFICANT CHANGE UP (ref 135–145)
WBC # BLD: 9.28 K/UL — SIGNIFICANT CHANGE UP (ref 3.8–10.5)
WBC # FLD AUTO: 9.28 K/UL — SIGNIFICANT CHANGE UP (ref 3.8–10.5)

## 2022-02-22 PROCEDURE — 73590 X-RAY EXAM OF LOWER LEG: CPT | Mod: 26,LT

## 2022-02-22 PROCEDURE — 71045 X-RAY EXAM CHEST 1 VIEW: CPT | Mod: 26

## 2022-02-22 PROCEDURE — 93010 ELECTROCARDIOGRAM REPORT: CPT

## 2022-02-22 PROCEDURE — 73630 X-RAY EXAM OF FOOT: CPT | Mod: 26,LT

## 2022-02-22 PROCEDURE — 99285 EMERGENCY DEPT VISIT HI MDM: CPT

## 2022-02-22 PROCEDURE — 99223 1ST HOSP IP/OBS HIGH 75: CPT

## 2022-02-22 RX ORDER — INSULIN DETEMIR 100/ML (3)
0 INSULIN PEN (ML) SUBCUTANEOUS
Qty: 0 | Refills: 0 | DISCHARGE

## 2022-02-22 RX ORDER — HEPARIN SODIUM 5000 [USP'U]/ML
5000 INJECTION INTRAVENOUS; SUBCUTANEOUS EVERY 12 HOURS
Refills: 0 | Status: DISCONTINUED | OUTPATIENT
Start: 2022-02-22 | End: 2022-02-25

## 2022-02-22 RX ORDER — INSULIN GLARGINE 100 [IU]/ML
8 INJECTION, SOLUTION SUBCUTANEOUS AT BEDTIME
Refills: 0 | Status: DISCONTINUED | OUTPATIENT
Start: 2022-02-22 | End: 2022-02-25

## 2022-02-22 RX ORDER — VANCOMYCIN HCL 1 G
750 VIAL (EA) INTRAVENOUS EVERY 24 HOURS
Refills: 0 | Status: DISCONTINUED | OUTPATIENT
Start: 2022-02-22 | End: 2022-02-25

## 2022-02-22 RX ORDER — GENTAMICIN SULFATE 0.1 %
1 OINTMENT (GRAM) TOPICAL ONCE
Refills: 0 | Status: DISCONTINUED | OUTPATIENT
Start: 2022-02-22 | End: 2022-02-22

## 2022-02-22 RX ORDER — ASPIRIN/CALCIUM CARB/MAGNESIUM 324 MG
0 TABLET ORAL
Qty: 0 | Refills: 0 | DISCHARGE

## 2022-02-22 RX ORDER — LEVOTHYROXINE SODIUM 125 MCG
1 TABLET ORAL
Qty: 0 | Refills: 0 | DISCHARGE

## 2022-02-22 RX ORDER — INFLUENZA VIRUS VACCINE 15; 15; 15; 15 UG/.5ML; UG/.5ML; UG/.5ML; UG/.5ML
0.7 SUSPENSION INTRAMUSCULAR ONCE
Refills: 0 | Status: DISCONTINUED | OUTPATIENT
Start: 2022-02-22 | End: 2022-02-25

## 2022-02-22 RX ORDER — ASPIRIN/CALCIUM CARB/MAGNESIUM 324 MG
81 TABLET ORAL DAILY
Refills: 0 | Status: DISCONTINUED | OUTPATIENT
Start: 2022-02-22 | End: 2022-02-25

## 2022-02-22 RX ORDER — ATORVASTATIN CALCIUM 80 MG/1
20 TABLET, FILM COATED ORAL AT BEDTIME
Refills: 0 | Status: DISCONTINUED | OUTPATIENT
Start: 2022-02-22 | End: 2022-02-25

## 2022-02-22 RX ORDER — DEXTROSE 50 % IN WATER 50 %
15 SYRINGE (ML) INTRAVENOUS ONCE
Refills: 0 | Status: DISCONTINUED | OUTPATIENT
Start: 2022-02-22 | End: 2022-02-25

## 2022-02-22 RX ORDER — SODIUM CHLORIDE 9 MG/ML
1000 INJECTION INTRAMUSCULAR; INTRAVENOUS; SUBCUTANEOUS ONCE
Refills: 0 | Status: COMPLETED | OUTPATIENT
Start: 2022-02-22 | End: 2022-02-22

## 2022-02-22 RX ORDER — INSULIN LISPRO 100/ML
VIAL (ML) SUBCUTANEOUS
Refills: 0 | Status: DISCONTINUED | OUTPATIENT
Start: 2022-02-22 | End: 2022-02-25

## 2022-02-22 RX ORDER — MELOXICAM 15 MG/1
1 TABLET ORAL
Qty: 10 | Refills: 0
Start: 2022-02-22 | End: 2022-03-03

## 2022-02-22 RX ORDER — LEVOTHYROXINE SODIUM 125 MCG
100 TABLET ORAL DAILY
Refills: 0 | Status: DISCONTINUED | OUTPATIENT
Start: 2022-02-22 | End: 2022-02-25

## 2022-02-22 RX ORDER — GENTAMICIN SULFATE 0.1 %
1 OINTMENT (GRAM) TOPICAL DAILY
Refills: 0 | Status: DISCONTINUED | OUTPATIENT
Start: 2022-02-22 | End: 2022-02-25

## 2022-02-22 RX ORDER — LANOLIN ALCOHOL/MO/W.PET/CERES
3 CREAM (GRAM) TOPICAL AT BEDTIME
Refills: 0 | Status: DISCONTINUED | OUTPATIENT
Start: 2022-02-22 | End: 2022-02-25

## 2022-02-22 RX ORDER — DEXTROSE 50 % IN WATER 50 %
12.5 SYRINGE (ML) INTRAVENOUS ONCE
Refills: 0 | Status: DISCONTINUED | OUTPATIENT
Start: 2022-02-22 | End: 2022-02-25

## 2022-02-22 RX ORDER — DEXTROSE 50 % IN WATER 50 %
25 SYRINGE (ML) INTRAVENOUS ONCE
Refills: 0 | Status: DISCONTINUED | OUTPATIENT
Start: 2022-02-22 | End: 2022-02-25

## 2022-02-22 RX ORDER — SODIUM CHLORIDE 9 MG/ML
1000 INJECTION, SOLUTION INTRAVENOUS
Refills: 0 | Status: DISCONTINUED | OUTPATIENT
Start: 2022-02-22 | End: 2022-02-25

## 2022-02-22 RX ORDER — INSULIN LISPRO 100/ML
VIAL (ML) SUBCUTANEOUS AT BEDTIME
Refills: 0 | Status: DISCONTINUED | OUTPATIENT
Start: 2022-02-22 | End: 2022-02-25

## 2022-02-22 RX ORDER — ONDANSETRON 8 MG/1
4 TABLET, FILM COATED ORAL EVERY 8 HOURS
Refills: 0 | Status: DISCONTINUED | OUTPATIENT
Start: 2022-02-22 | End: 2022-02-25

## 2022-02-22 RX ORDER — ACETAMINOPHEN 500 MG
650 TABLET ORAL EVERY 6 HOURS
Refills: 0 | Status: DISCONTINUED | OUTPATIENT
Start: 2022-02-22 | End: 2022-02-25

## 2022-02-22 RX ORDER — VANCOMYCIN HCL 1 G
1000 VIAL (EA) INTRAVENOUS ONCE
Refills: 0 | Status: COMPLETED | OUTPATIENT
Start: 2022-02-22 | End: 2022-02-22

## 2022-02-22 RX ORDER — GLUCAGON INJECTION, SOLUTION 0.5 MG/.1ML
1 INJECTION, SOLUTION SUBCUTANEOUS ONCE
Refills: 0 | Status: DISCONTINUED | OUTPATIENT
Start: 2022-02-22 | End: 2022-02-25

## 2022-02-22 RX ADMIN — Medication 250 MILLIGRAM(S): at 11:23

## 2022-02-22 RX ADMIN — Medication 650 MILLIGRAM(S): at 13:12

## 2022-02-22 RX ADMIN — INSULIN GLARGINE 8 UNIT(S): 100 INJECTION, SOLUTION SUBCUTANEOUS at 21:22

## 2022-02-22 RX ADMIN — SODIUM CHLORIDE 1000 MILLILITER(S): 9 INJECTION INTRAMUSCULAR; INTRAVENOUS; SUBCUTANEOUS at 12:16

## 2022-02-22 RX ADMIN — Medication 650 MILLIGRAM(S): at 12:25

## 2022-02-22 RX ADMIN — ATORVASTATIN CALCIUM 20 MILLIGRAM(S): 80 TABLET, FILM COATED ORAL at 21:21

## 2022-02-22 RX ADMIN — Medication 1: at 16:39

## 2022-02-22 RX ADMIN — Medication 1000 MILLIGRAM(S): at 12:24

## 2022-02-22 RX ADMIN — HEPARIN SODIUM 5000 UNIT(S): 5000 INJECTION INTRAVENOUS; SUBCUTANEOUS at 17:47

## 2022-02-22 NOTE — CONSULT NOTE ADULT - ASSESSMENT
1. Left DM ulcer with cellulitis.  Do not suspect deeper infection at this time, but may need to consider if failure to respond to IV ABx.  2. DM with neuropathy and PAD.     PLAN:  1. IV ABx (Vancomycin) as ordered.  2, Local wound care with gentamycin cream QD.  3, Vascular Consult--Dr. Ro to access circulation.   4. Will follow.

## 2022-02-22 NOTE — H&P ADULT - NSHPSOCIALHISTORY_GEN_ALL_CORE
Denies smoking, alcohol or drug use.     FH - Mother  at 90, had Parkinsons. Father  in 60s, killed in car accident

## 2022-02-22 NOTE — CONSULT NOTE ADULT - ASSESSMENT
Full consult to follow  D/w Dr Crews 81y female with hx Type 2 Diabetes Mellitus, Hypertension, Hyperlipidemia, Anemia, Hypothyroidism who presents to the ER complaining of worsening left foot swelling and redness x 5days. Patient noticed L foot swelling and redness last week thursday. She states she was placed on oral bactrim by her podiatrist,  but then yesterday swelling and redness progressed and hence she came to ER. She denies fever, chills at home, here Tmax 100.5, receiving IV Vancomycin and feels improvement. She states allergies to PCN    PLAN  Will cont IV Vanc for now--presentation suggestive of beta-hemolytic strep, but polymicrobial infections in diabetics not uncommon  ? hx of PCN allergies reviewed with pt  f/u cultures and f/u with podiatry to see if any additional imaging or interventions required  monitor WBC, fevers and ESR  Keep leg elevated  D/w Dr Crews

## 2022-02-22 NOTE — H&P ADULT - ASSESSMENT
81y year old Female with PMH of Type 2 Diabetes Mellitus, Hypertension, Hyperlipidemia, Anemia, Hypothyroidism who presents to the ER complaining of worsening left foot swelling and redness x 5days. Febrile 100.5 in ER.     #     # Type 2 Diabetes Mellitus     # Hypertension   # Hyperlipidemia   # Hypothyroidism  - c/w home meds - enalapril, Lipitor (therapeutic exchange), synthroid    # DVT Prophylaxis:  - Heparin    Admission Orders:  Vitals q8h  Diet: Diabetic   Activity: OOB with assistance as tolerated. PT/OT as tolerated  Precautions: Aspiration, Fall  Code status: Full Code     IMPROVE VTE Individual Risk Assessment          RISK                                                          Points  [  ] Previous VTE                                                3  [  ] Thrombophilia                                             2  [  ] Lower limb paralysis                                   2        (unable to hold up >15 seconds)    [  ] Current Cancer                                             2         (within 6 months)  [  ] Immobilization > 24 hrs                              1  [  ] ICU/CCU stay > 24 hours                             1  [ X ] Age > 60                                                         1    IMPROVE VTE Score:         [    1     ] 81y year old Female with PMH of Type 2 Diabetes Mellitus, Hypertension, Hyperlipidemia, Anemia, Hypothyroidism who presents to the ER complaining of worsening left foot swelling and redness x 5days. Febrile 100.5 in ER.     # Diabetic foot wound  - failed outpatient bactrim  - Podiatry and Infectious Diseases to eval  - For now - Vancomycin 750mg q12h    # Type 2 Diabetes Mellitus   - Obtain HbA1C  - FS and ANA MARIA  - Reducing home Lantus 12u to 8u qhs    # Hypertension   # Hyperlipidemia   # Hypothyroidism  - c/w home meds - enalapril, Lipitor (therapeutic exchange), synthroid    # DVT Prophylaxis:  - Heparin    Admission Orders:  Vitals q8h  Diet: Diabetic   Activity: OOB with assistance as tolerated. PT/OT as tolerated  Precautions: Aspiration, Fall  Code status: Full Code     Discussed patient's reason for admission, reviewed most recent labs and medications with patient and daughter Zainab, [phone]. The opportunity for questions was provided and all questions asked were answered.     IMPROVE VTE Individual Risk Assessment          RISK                                                          Points  [  ] Previous VTE                                                3  [  ] Thrombophilia                                             2  [  ] Lower limb paralysis                                   2        (unable to hold up >15 seconds)    [  ] Current Cancer                                             2         (within 6 months)  [  ] Immobilization > 24 hrs                              1  [  ] ICU/CCU stay > 24 hours                             1  [ X ] Age > 60                                                         1    IMPROVE VTE Score:         [    1     ]

## 2022-02-22 NOTE — ED PROVIDER NOTE - PHYSICAL EXAMINATION
left foot - erythema noted, with open wound noted, erythema streaking to leg with swelling noted, positive 2+ pedal pulses, less than 2 sec cap refill

## 2022-02-22 NOTE — ED PROVIDER NOTE - ATTENDING CONTRIBUTION TO CARE
I personally evaluated the patient. I reviewed the Resident’s or Physician Assistant’s note (as assigned above), and agree with the findings and plan except as documented in my note.  81 yr old female with hx of DM  ( on insulin), HTN, hypothyroid presents with left foot swelling and redness x 5 days. Pt reports seen by Dr. Carrillo- podiatrist when symptoms started 5 days ago, and I and D was done, culture was sent and pt was started on bactrim. Pt reports worsening swelling and redness and followed up with Dr. Carrillo today who sent pt to ED for further eval. Pt denies any fever, chills, n/v/d or any other symptoms. Pt allergic to penicillin.  left foot - erythema noted, with open wound noted, erythema streaking to leg with swelling noted, positive 2+ pedal pulses, less than 2 sec cap refill   plan to admit for IV abx

## 2022-02-22 NOTE — ED PROVIDER NOTE - OBJECTIVE STATEMENT
81 yr old female with hx of DM  ( on insulin), HTN, hypothyroid presents with left foot swelling and redness x 5 days. Pt reports seen by Dr. Carrillo- podiatrist when symptoms started 5 days ago, and I and D was done, culture was sent and pt was started on bactrim. Pt reports worsening swelling and redness and followed up with Dr. Carrillo today who sent pt to ED for further eval. Pt denies any fever, chills, n/v/d or any other symptoms. Pt allergic to penicillin.

## 2022-02-22 NOTE — H&P ADULT - HISTORY OF PRESENT ILLNESS
TAPAN GAYTAN is a 81y year old Female with PMH of Type 2 Diabetes Mellitus, Hypertension, Hyperlipidemia, Anemia, Hypothyroidism who presents to the ER complaining of worsening left foot swelling and redness x 5days.     In ER, Tmax 100.5, HR 80, /58, 99% on room air. Given 1g Vancomycin.  TAPAN GAYTAN is a 81y year old Female with PMH of Type 2 Diabetes Mellitus, Hypertension, Hyperlipidemia, Anemia, Hypothyroidism who presents to the ER complaining of worsening left foot swelling and redness x 5days.     Patient states she noticed L foot wound at the end of last week, which progressed to swelling of L foot, she visited her Podiatrist, Dr. Carrillo, who prescribed bactrim. Patient took abx as prescribed however noticed worsening L foot pain and swelling which prompted her to visit Dr. Carrillo this morning who referred her to ER.    Denies fever, chills at home.     In ER, Tmax 100.5, HR 80, /58, 99% on room air. Given 1g Vancomycin.

## 2022-02-22 NOTE — H&P ADULT - NSHPPHYSICALEXAM_GEN_ALL_CORE
Wt(kg): --Vital Signs Last 24 Hrs  T(C): 38.1 (22 Feb 2022 11:25), Max: 38.1 (22 Feb 2022 11:25)  T(F): 100.5 (22 Feb 2022 11:25), Max: 100.5 (22 Feb 2022 11:25)  HR: 70 (22 Feb 2022 12:15) (70 - 80)  BP: 151/63 (22 Feb 2022 12:15) (146/58 - 151/63)  BP(mean): --  RR: 16 (22 Feb 2022 12:15) (16 - 16)  SpO2: 97% (22 Feb 2022 12:15) (97% - 99%)    PHYSICAL EXAM:  GENERAL: NAD, well-groomed, well-developed  NERVOUS SYSTEM:  Alert & Oriented X3, Good concentration; Motor Strength 5/5 B/L upper and lower extremities; DTRs 2+ intact and symmetric  HEAD:  Atraumatic, Normocephalic  EYES: EOMI, PERRLA, conjunctiva and sclera clear  ENMT: No tonsillar erythema, exudates, or enlargement; Moist mucous membranes, Good dentition, No lesions  NECK: Supple, No JVD, Normal thyroid  CHEST/LUNG: Clear to percussion bilaterally; No rales, rhonchi, wheezing, or rubs  HEART: Regular rate and rhythm; No murmurs, rubs, or gallops  ABDOMEN: Soft, Nontender, Nondistended; Bowel sounds present  EXTREMITIES:  2+ Peripheral Pulses, No clubbing. R first metatarsal amputation. L foot erythematous and swollen, wound on dorsal aspect  LYMPH: No lymphadenopathy noted  SKIN: No rashes or lesions

## 2022-02-22 NOTE — ED PROVIDER NOTE - CLINICAL SUMMARY MEDICAL DECISION MAKING FREE TEXT BOX
81 yr old female with hx of DM  ( on insulin), HTN, hypothyroid presents with left foot swelling and redness x 5 days. Pt reports seen by Dr. Carrillo- podiatrist when symptoms started 5 days ago, and I and D was done, culture was sent and pt was started on bactrim. Pt reports worsening swelling and redness and followed up with Dr. Carrillo today who sent pt to ED for further eval. Pt denies any fever, chills, n/v/d or any other symptoms. Pt allergic to penicillin.  left foot - erythema noted, with open wound noted, erythema streaking to leg with swelling noted, positive 2+ pedal pulses, less than 2 sec cap refill 81 yr old female with hx of DM  ( on insulin), HTN, hypothyroid presents with left foot swelling and redness x 5 days. Pt reports seen by Dr. Carrillo- podiatrist when symptoms started 5 days ago, and I and D was done, culture was sent and pt was started on bactrim. Pt reports worsening swelling and redness and followed up with Dr. Carrillo today who sent pt to ED for further eval. Pt denies any fever, chills, n/v/d or any other symptoms. Pt allergic to penicillin.  left foot - erythema noted, with open wound noted, erythema streaking to leg with swelling noted, positive 2+ pedal pulses, less than 2 sec cap refill   plan to admit for IV abx

## 2022-02-22 NOTE — PATIENT PROFILE ADULT - FALL HARM RISK - HARM RISK INTERVENTIONS
Assistance with ambulation/Assistance OOB with selected safe patient handling equipment/Communicate Risk of Fall with Harm to all staff/Discuss with provider need for PT consult/Monitor gait and stability/Reinforce activity limits and safety measures with patient and family/Tailored Fall Risk Interventions/Visual Cue: Yellow wristband and red socks/Bed in lowest position, wheels locked, appropriate side rails in place/Call bell, personal items and telephone in reach/Instruct patient to call for assistance before getting out of bed or chair/Non-slip footwear when patient is out of bed/[place] to call system/Physically safe environment - no spills, clutter or unnecessary equipment/Purposeful Proactive Rounding/Room/bathroom lighting operational, light cord in reach

## 2022-02-22 NOTE — ED PROVIDER NOTE - NSTIMEPROVIDERCAREINITIATE_GEN_ER
"Chief Complaint   Patient presents with     Well Child       Initial BP 98/64 (BP Location: Left arm, Patient Position: Sitting, Cuff Size: Child)   Pulse 92   Temp 98.3  F (36.8  C) (Tympanic)   Ht 1.143 m (3' 9\")   Wt 19.4 kg (42 lb 11 oz)   SpO2 100%   BMI 14.82 kg/m   Estimated body mass index is 14.82 kg/m  as calculated from the following:    Height as of this encounter: 1.143 m (3' 9\").    Weight as of this encounter: 19.4 kg (42 lb 11 oz).  Medication Reconciliation: complete  Gris Kruse MA  "
22-Feb-2022 10:31

## 2022-02-22 NOTE — H&P ADULT - CONVERSATION DETAILS
Discussed GOC with patient who makes own decisions. She states she recently lost her son in law. MOLST and terminology discussed with patient.     She wishes to be full code but would like ongoing discussions after speaking to daughter and family.

## 2022-02-22 NOTE — ED ADULT NURSE NOTE - SUICIDE SCREENING QUESTION 1
[address] 
[phone] Patient: Cami Guillen MRN: [id number] DSS:[birth year] Visit Information Date & Time Provider Department Dept. Phone Encounter #  
 3/29/2018 11:00 AM Lorenzo Rodrigez DO [facility] [phone] [id number] Upcoming Health Maintenance Date Due Hepatitis C Screening 1954 Pneumococcal 19-64 Highest Risk (1 of 3 - PCV13) 8/3/1973 COLONOSCOPY 1/1/2018 BREAST CANCER SCRN MAMMOGRAM 6/14/2019 PAP AKA CERVICAL CYTOLOGY 5/19/2022 DTaP/Tdap/Td series (2 - Td) 10/6/2025 Allergies as of 3/29/2018  Review Complete On: 3/29/2018 By: Lorenzo Rodrigez DO Severity Noted Reaction Type Reactions Erythromycin  09/13/2016    Nausea Only Current Immunizations  Never Reviewed Name Date Influenza Vaccine 10/6/2015, 10/27/2014 Influenza Vaccine (Quad) PF 10/12/2017 Tdap 10/6/2015 Zoster Vaccine, Live 1/1/2012 Not reviewed this visit You Were Diagnosed With   
  
 Codes Comments Cough    -  Primary ICD-10-CM: O30 ICD-9-CM: 395. 2 Vitals BP Pulse Temp Resp Height(growth percentile) Weight(growth percentile) 124/74 (BP 1 Location: Left arm, BP Patient Position: Sitting) 88 98 °F (36.7 °C) (Oral) 16 5' 4\" (1.626 m) 166 lb (75.3 kg) SpO2 BMI OB Status Smoking Status 95% 28.49 kg/m2 Postmenopausal Never Smoker Vitals History BMI and BSA Data Body Mass Index Body Surface Area  
 28.49 kg/m 2 1.84 m 2 Preferred Pharmacy Pharmacy Name Phone [facility] [address]  [facility] [phone] Your Updated Medication List  
  
   
This list is accurate as of 3/29/18  3:38 PM.  Always use your most recent med list.  
  
  
  
  
 albuterol 90 mcg/actuation inhaler Commonly known as:  PROVENTIL HFA, VENTOLIN HFA, PROAIR HFA Take 1 Puff by inhalation every four (4) hours as needed for Wheezing. amLODIPine 5 mg tablet Commonly known as:  Jaimie Barnett TAKE 1 TABLET BY MOUTH DAILY  
  
 budesonide 90 mcg/actuation Aepb inhaler Commonly known as:  Nkechi Phillip Take 2 Puffs by inhalation two (2) times a day. diclofenac EC 75 mg EC tablet Commonly known as:  VOLTAREN  
TAKE 1 TABLET BY MOUTH TWICE DAILY AS NEEDED  
  
 doxycycline 100 mg tablet Commonly known as:  ADOXA Take 1 Tab by mouth two (2) times a day for 7 days. guaiFENesin-codeine 100-10 mg/5 mL solution Commonly known as:  ROBITUSSIN AC Take 5 mL by mouth three (3) times daily as needed for Cough. Max Daily Amount: 15 mL.  
  
 hydroCHLOROthiazide 25 mg tablet Commonly known as:  HYDRODIURIL Take 1 Tab by mouth daily. multivitamin tablet Commonly known as:  ONE A DAY Take 1 Tab by mouth daily. potassium chloride 10 mEq tablet Commonly known as:  KLOR-CON  
TAKE 1 CAPSULE BY MOUTH TWICE DAILY  
  
 venlafaxine-SR 37.5 mg capsule Commonly known as:  EFFEXOR-XR  
TAKE 1 CAPSULE BY MOUTH EVERY DAY Prescriptions Sent to Pharmacy Refills  
 doxycycline (ADOXA) 100 mg tablet 0 Sig: Take 1 Tab by mouth two (2) times a day for 7 days. Class: Normal  
 Pharmacy: [facility] [address] [place] [address] [address] AT [facility] [address] ([facility] [place] Ph #: [phone] Route: Oral  
  
To-Do List   
 03/29/2018 Imaging:  XR CHEST PA LAT Introducing [facility] HEALTH SERVICES! [employer] introduces [employer] patient portal. Now you can access parts of your medical record, email your doctor's office, and request medication refills online. 1. In your internet browser, go to [email or web address] 2. Click on the First Time User? Click Here link in the Sign In box. You will see the New Member Sign Up page. 3. Enter your [employer] Access Code exactly as it appears below. You will not need to use this code after youve completed the sign-up process. If you do not sign up before the expiration date, you must request a new code. · [employer] Access Code: [email or web address] Expires: 4/30/2018  9:34 AM 
 
4. Enter the last four digits of your Social Security Number (xxxx) and Date of Birth (mm/dd/yyyy) as indicated and click Submit. You will be taken to the next sign-up page. 5. Create a [employer] ID. This will be your [employer] login ID and cannot be changed, so think of one that is secure and easy to remember. 6. Create a [employer] password. You can change your password at any time. 7. Enter your Password Reset Question and Answer. This can be used at a later time if you forget your password. 8. Enter your e-mail address. You will receive e-mail notification when new information is available in [address]. 9. Click Sign Up. You can now view and download portions of your medical record. 10. Click the Download Summary menu link to download a portable copy of your medical information. If you have questions, please visit the Frequently Asked Questions section of the [employer] website. Remember, [employer] is NOT to be used for urgent needs. For medical emergencies, dial 911. Now available from your iPhone and Android! Please provide this summary of care documentation to your next provider. Your primary care clinician is listed as Eulogio Rojas. If you have any questions after today's visit, please call [phone]. No

## 2022-02-22 NOTE — ED ADULT NURSE NOTE - OBJECTIVE STATEMENT
sent by podiatry for infected wound sole of left foot, patient states pus came out of the wound so she returned to podiatry and was sent  here.  skin of foot and lower leg is red, pain 0 at rest worse with ambulation. PMH of IDDM. low grade temp noted denies chills. VSS

## 2022-02-22 NOTE — PATIENT PROFILE ADULT - NSPROMEDSDISPOSITION_GEN_A_NUR
enelapril, aspirin levotthyroxine, rosuvastatin medications placed in med cabinet by the room/bedside

## 2022-02-22 NOTE — CONSULT NOTE ADULT - SUBJECTIVE AND OBJECTIVE BOX
HPI:   Patient is a 81y female with    REVIEW OF SYSTEMS:  All other review of systems negative (Comprehensive ROS)    PAST MEDICAL & SURGICAL HISTORY:  Cancer of neck    HTN (hypertension)    Hypothyroidism    Diabetes    History of appendectomy    Elective surgery  s/p prolapse bladder repair        Allergies    penicillin (Unknown)  shellfish (Unknown)    Intolerances        Antimicrobials Day #    vancomycin  IVPB 750 milliGRAM(s) IV Intermittent every 24 hours    Other Medications:  acetaminophen     Tablet .. 650 milliGRAM(s) Oral every 6 hours PRN  aluminum hydroxide/magnesium hydroxide/simethicone Suspension 30 milliLiter(s) Oral every 4 hours PRN  aspirin  chewable 81 milliGRAM(s) Oral daily  atorvastatin 20 milliGRAM(s) Oral at bedtime  dextrose 40% Gel 15 Gram(s) Oral once  dextrose 5%. 1000 milliLiter(s) IV Continuous <Continuous>  dextrose 5%. 1000 milliLiter(s) IV Continuous <Continuous>  dextrose 50% Injectable 25 Gram(s) IV Push once  dextrose 50% Injectable 12.5 Gram(s) IV Push once  dextrose 50% Injectable 25 Gram(s) IV Push once  enalapril 5 milliGRAM(s) Oral daily  glucagon  Injectable 1 milliGRAM(s) IntraMuscular once  heparin   Injectable 5000 Unit(s) SubCutaneous every 12 hours  insulin glargine Injectable (LANTUS) 8 Unit(s) SubCutaneous at bedtime  insulin lispro (ADMELOG) corrective regimen sliding scale   SubCutaneous three times a day before meals  insulin lispro (ADMELOG) corrective regimen sliding scale   SubCutaneous at bedtime  levothyroxine 100 MICROGram(s) Oral daily  melatonin 3 milliGRAM(s) Oral at bedtime PRN  ondansetron Injectable 4 milliGRAM(s) IV Push every 8 hours PRN      FAMILY HISTORY:      SOCIAL HISTORY:  Smoking:     ETOH:     Drug Use:     Single     T(F): 100.5 (02-22-22 @ 11:25), Max: 100.5 (02-22-22 @ 11:25)  HR: 70 (02-22-22 @ 12:15)  BP: 151/63 (02-22-22 @ 12:15)  RR: 16 (02-22-22 @ 12:15)  SpO2: 97% (02-22-22 @ 12:15)  Wt(kg): --    PHYSICAL EXAM:  General: alert, no acute distress  Eyes:  anicteric, no conjunctival injection, no discharge  Oropharynx: no lesions or injection 	  Neck: supple, without adenopathy  Lungs: clear to auscultation  Heart: regular rate and rhythm; no murmur, rubs or gallops  Abdomen: soft, nondistended, nontender, without mass or organomegaly  Skin: no lesions  Extremities: no clubbing, cyanosis, or edema  Neurologic: alert, oriented, moves all extremities    LAB RESULTS:                        10.0   9.28  )-----------( 241      ( 22 Feb 2022 11:07 )             31.7     02-22    139  |  101  |  24<H>  ----------------------------<  145<H>  4.5   |  27  |  1.12    Ca    8.6      22 Feb 2022 11:07    TPro  6.9  /  Alb  3.2<L>  /  TBili  0.2  /  DBili  x   /  AST  16  /  ALT  17  /  AlkPhos  78  02-22    LIVER FUNCTIONS - ( 22 Feb 2022 11:07 )  Alb: 3.2 g/dL / Pro: 6.9 g/dL / ALK PHOS: 78 U/L / ALT: 17 U/L / AST: 16 U/L / GGT: x               MICROBIOLOGY REVIEWED:    RADIOLOGY REVIEWED:   HPI:   Patient is a 81y female with hx Type 2 Diabetes Mellitus, Hypertension, Hyperlipidemia, Anemia, Hypothyroidism who presents to the ER complaining of worsening left foot swelling and redness x 5days. Patient noticed L foot swelling and redness last week thursday. She states she was placed on oral bactrim by her podiatrist,  but then yesterday swelling and redness progressed and hence she came to ER. She denies fever, chills at home, here Tmax 100.5, receiving IV Vancomycin and feels improvement. She states allergies to PCN    REVIEW OF SYSTEMS:  All other review of systems negative (Comprehensive ROS)    PAST MEDICAL & SURGICAL HISTORY:  Cancer of neck    HTN (hypertension)    Hypothyroidism    Diabetes    History of appendectomy    Elective surgery  s/p prolapse bladder repair        Allergies    penicillin (Unknown)  shellfish (Unknown)    Intolerances        Antimicrobials Day #    vancomycin  IVPB 750 milliGRAM(s) IV Intermittent every 24 hours    Other Medications:  acetaminophen     Tablet .. 650 milliGRAM(s) Oral every 6 hours PRN  aluminum hydroxide/magnesium hydroxide/simethicone Suspension 30 milliLiter(s) Oral every 4 hours PRN  aspirin  chewable 81 milliGRAM(s) Oral daily  atorvastatin 20 milliGRAM(s) Oral at bedtime  dextrose 40% Gel 15 Gram(s) Oral once  dextrose 5%. 1000 milliLiter(s) IV Continuous <Continuous>  dextrose 5%. 1000 milliLiter(s) IV Continuous <Continuous>  dextrose 50% Injectable 25 Gram(s) IV Push once  dextrose 50% Injectable 12.5 Gram(s) IV Push once  dextrose 50% Injectable 25 Gram(s) IV Push once  enalapril 5 milliGRAM(s) Oral daily  glucagon  Injectable 1 milliGRAM(s) IntraMuscular once  heparin   Injectable 5000 Unit(s) SubCutaneous every 12 hours  insulin glargine Injectable (LANTUS) 8 Unit(s) SubCutaneous at bedtime  insulin lispro (ADMELOG) corrective regimen sliding scale   SubCutaneous three times a day before meals  insulin lispro (ADMELOG) corrective regimen sliding scale   SubCutaneous at bedtime  levothyroxine 100 MICROGram(s) Oral daily  melatonin 3 milliGRAM(s) Oral at bedtime PRN  ondansetron Injectable 4 milliGRAM(s) IV Push every 8 hours PRN      FAMILY HISTORY:      SOCIAL HISTORY:  Smoking:     ETOH:     Drug Use:     Single     T(F): 100.5 (02-22-22 @ 11:25), Max: 100.5 (02-22-22 @ 11:25)  HR: 70 (02-22-22 @ 12:15)  BP: 151/63 (02-22-22 @ 12:15)  RR: 16 (02-22-22 @ 12:15)  SpO2: 97% (02-22-22 @ 12:15)  Wt(kg): --    PHYSICAL EXAM:  General: alert, no acute distress  Eyes:  anicteric, no conjunctival injection, no discharge  Oropharynx: no lesions or injection 	  Neck: supple, without adenopathy  Lungs: clear to auscultation  Heart: regular rate and rhythm; no murmur, rubs or gallops  Abdomen: soft, nondistended, nontender, without mass or organomegaly  Skin: no lesions  Extremities: L foot with swelling, redness and warmth, plantar area with shallow ulcer and denuded skin with surrounding edema and redness  Neurologic: alert, oriented, moves all extremities    LAB RESULTS:                        10.0   9.28  )-----------( 241      ( 22 Feb 2022 11:07 )             31.7     02-22    139  |  101  |  24<H>  ----------------------------<  145<H>  4.5   |  27  |  1.12    Ca    8.6      22 Feb 2022 11:07    TPro  6.9  /  Alb  3.2<L>  /  TBili  0.2  /  DBili  x   /  AST  16  /  ALT  17  /  AlkPhos  78  02-22    LIVER FUNCTIONS - ( 22 Feb 2022 11:07 )  Alb: 3.2 g/dL / Pro: 6.9 g/dL / ALK PHOS: 78 U/L / ALT: 17 U/L / AST: 16 U/L / GGT: x               MICROBIOLOGY REVIEWED:    RADIOLOGY REVIEWED:

## 2022-02-22 NOTE — H&P ADULT - NSHPREVIEWOFSYSTEMS_GEN_ALL_CORE
REVIEW OF SYSTEMS:  CONSTITUTIONAL: No fever, weight loss, or fatigue  EYES: No eye pain, visual disturbances, or discharge  ENMT:  No difficulty hearing, tinnitus, vertigo; No sinus or throat pain  NECK: No pain or stiffness  BREASTS: No pain, masses, or nipple discharge  RESPIRATORY: No cough, wheezing, chills or hemoptysis; No shortness of breath  CARDIOVASCULAR: No chest pain, palpitations, dizziness, or leg swelling  GASTROINTESTINAL: No abdominal or epigastric pain. No nausea, vomiting, or hematemesis; No diarrhea or constipation. No melena or hematochezia.  GENITOURINARY: No dysuria, frequency, hematuria, or incontinence  NEUROLOGICAL: No headaches, memory loss, loss of strength, numbness, or tremors  SKIN: No itching, burning, rashes, or lesions   LYMPH NODES: No enlarged glands  ENDOCRINE: No heat or cold intolerance; No hair loss  MUSCULOSKELETAL: No joint pain or swelling; No muscle, back pain. +L foot pain and redness  PSYCHIATRIC: No depression, anxiety, mood swings, or difficulty sleeping  HEME/LYMPH: No easy bruising, or bleeding gums  ALLERGY AND IMMUNOLOGIC: No hives or eczema    ALL ROS REVIEWED AND NORMAL EXCEPT AS STATED ABOVE

## 2022-02-22 NOTE — CONSULT NOTE ADULT - SUBJECTIVE AND OBJECTIVE BOX
This 81y year old Female well known to me  with PMH of Type 2 Diabetes Mellitus, Hypertension, Hyperlipidemia, Anemia, Hypothyroidism is seen on consult for a  Left DM foot infection. Patient first seen 5 days ago  as an outpatient with swelling of Left  foot. Dx with ulcer, cellulitis and DM foot infection. Started on Bactrim DS ( PCN Allergy). Patient took abx as prescribed however noticed worsening Left  foot pain and swelling. She was referred her to ER for admission and IV ABX..   Denies fever, chills at home.  Reviewed chart, med and ID notes.    PAST MEDICAL & SURGICAL HISTORY:  Cancer of neck    HTN (hypertension)    Hypothyroidism    Diabetes    History of appendectomy    Elective surgery  s/p prolapse bladder repair    Allergies    penicillin (Unknown)  shellfish (Unknown)    Intolerances        Vital Signs Last 24 Hrs  T(C): 36.7 (22 Feb 2022 16:44), Max: 38.1 (22 Feb 2022 11:25)  T(F): 98.1 (22 Feb 2022 16:44), Max: 100.5 (22 Feb 2022 11:25)  HR: 67 (22 Feb 2022 16:44) (67 - 80)  BP: 147/50 (22 Feb 2022 16:44) (126/62 - 151/63)  BP(mean): --  ABP: --  ABP(mean): --  RR: 16 (22 Feb 2022 16:44) (15 - 16)  SpO2: 96% (22 Feb 2022 16:44) (96% - 99%)                          10.0   9.28  )-----------( 241      ( 22 Feb 2022 11:07 )             31.7   02-22    139  |  101  |  24<H>  ----------------------------<  145<H>  4.5   |  27  |  1.12    OUTPATIENT CULTURE AND SENSITIVITY: (02/17/2022)   1. Staph scheiferi-- sensitive to Bactrim   2. Staph epi--resistant to Bactrim    EXAM:      Weakly palpable pedal pulses. Cap refill delayed.  Diminished sensations B/L. Submetatarsal 1 ulcer is 1.0 x 1.5 cm witout depth. There is a  mixed fibrotic and granular base. (-) exposed bone (-) probe  to bone. (-) Purulence. No undermining. (-) odor (+) edema and (+) erythema of medial plantar foot, extending into medial arch and ankle.       This 81y year old Female well known to me  with PMH of Type 2 Diabetes Mellitus, Hypertension, Hyperlipidemia, Anemia, Hypothyroidism is seen on consult for a  Left DM foot infection. Patient first seen 5 days ago  as an outpatient with swelling of Left  foot. Dx with ulcer, cellulitis and DM foot infection. Started on Bactrim DS ( PCN Allergy). Patient took abx as prescribed however noticed worsening Left  foot pain and swelling. She was referred her to ER for admission and IV ABX..   Denies fever, chills at home.  Reviewed chart, med and ID notes.    PAST MEDICAL & SURGICAL HISTORY:  Cancer of neck    HTN (hypertension)    Hypothyroidism    Diabetes    History of appendectomy    Elective surgery  s/p prolapse bladder repair    Allergies    penicillin (Unknown)  shellfish (Unknown)    Intolerances        Vital Signs Last 24 Hrs  T(C): 36.7 (22 Feb 2022 16:44), Max: 38.1 (22 Feb 2022 11:25)  T(F): 98.1 (22 Feb 2022 16:44), Max: 100.5 (22 Feb 2022 11:25)  HR: 67 (22 Feb 2022 16:44) (67 - 80)  BP: 147/50 (22 Feb 2022 16:44) (126/62 - 151/63)  BP(mean): --  ABP: --  ABP(mean): --  RR: 16 (22 Feb 2022 16:44) (15 - 16)  SpO2: 96% (22 Feb 2022 16:44) (96% - 99%)                          10.0   9.28  )-----------( 241      ( 22 Feb 2022 11:07 )             31.7   02-22    139  |  101  |  24<H>  ----------------------------<  145<H>  4.5   |  27  |  1.12    OUTPATIENT CULTURE AND SENSITIVITY: (02/17/2022)   1. Staph scheiferi-- sensitive to Bactrim   2. Staph epi--resistant to Bactrim    EXAM OF LEFT FOOT:      Weakly palpable pedal pulses. Cap refill delayed.  Diminished sensations B/L. Submetatarsal 1 ulcer is 1.0 x 1.5 cm with out depth. There is a  mixed fibrotic and granular base. (-) exposed bone (-) probe  to bone. (-) Purulence. No undermining. (-) odor (+) edema and (+) erythema of medial plantar foot, extending into medial arch and ankle.

## 2022-02-22 NOTE — H&P ADULT - NSHPLABSRESULTS_GEN_ALL_CORE
LABS:                        10.0   9.28  )-----------( 241      ( 22 Feb 2022 11:07 )             31.7     02-22    139  |  101  |  24<H>  ----------------------------<  145<H>  4.5   |  27  |  1.12    Ca    8.6      22 Feb 2022 11:07    TPro  6.9  /  Alb  3.2<L>  /  TBili  0.2  /  DBili  x   /  AST  16  /  ALT  17  /  AlkPhos  78  02-22    CAPILLARY BLOOD GLUCOSE    RADIOLOGY & ADDITIONAL TESTS:    Consultant(s) Notes Reviewed:  [ ] YES  [ ] NO   Care Discussed with Consultants/Other Providers [ x] YES  [ ] NO d/w Dr. Carrillo (Podiatry) and Dr. Espinoza (ID) who will eval  Imaging Personally Reviewed:  [ ] YES  [ ] NO LABS:                        10.0   9.28  )-----------( 241      ( 22 Feb 2022 11:07 )             31.7     02-22    139  |  101  |  24<H>  ----------------------------<  145<H>  4.5   |  27  |  1.12    Ca    8.6      22 Feb 2022 11:07    TPro  6.9  /  Alb  3.2<L>  /  TBili  0.2  /  DBili  x   /  AST  16  /  ALT  17  /  AlkPhos  78  02-22    CAPILLARY BLOOD GLUCOSE    RADIOLOGY & ADDITIONAL TESTS:    Consultant(s) Notes Reviewed:  [ ] YES  [ ] NO   Care Discussed with Consultants/Other Providers [ x] YES  [ ] NO d/w Dr. Carrillo (Podiatry) and Dr. Espinoza (ID) who will eval  Imaging Personally Reviewed:  [ ] YES  [ ] NO  2/22 EKG (personally reviewed by me): Sinus rhythm with sinus arrythmia at 70bpm

## 2022-02-23 LAB
A1C WITH ESTIMATED AVERAGE GLUCOSE RESULT: 7.1 % — HIGH (ref 4–5.6)
ALBUMIN SERPL ELPH-MCNC: 2.9 G/DL — LOW (ref 3.3–5)
ALP SERPL-CCNC: 69 U/L — SIGNIFICANT CHANGE UP (ref 40–120)
ALT FLD-CCNC: 12 U/L — SIGNIFICANT CHANGE UP (ref 10–45)
ANION GAP SERPL CALC-SCNC: 7 MMOL/L — SIGNIFICANT CHANGE UP (ref 5–17)
AST SERPL-CCNC: 15 U/L — SIGNIFICANT CHANGE UP (ref 10–40)
BILIRUB SERPL-MCNC: 0.2 MG/DL — SIGNIFICANT CHANGE UP (ref 0.2–1.2)
BUN SERPL-MCNC: 17 MG/DL — SIGNIFICANT CHANGE UP (ref 7–23)
CALCIUM SERPL-MCNC: 8.9 MG/DL — SIGNIFICANT CHANGE UP (ref 8.4–10.5)
CHLORIDE SERPL-SCNC: 105 MMOL/L — SIGNIFICANT CHANGE UP (ref 96–108)
CO2 SERPL-SCNC: 29 MMOL/L — SIGNIFICANT CHANGE UP (ref 22–31)
CREAT SERPL-MCNC: 1.04 MG/DL — SIGNIFICANT CHANGE UP (ref 0.5–1.3)
ESTIMATED AVERAGE GLUCOSE: 157 MG/DL — HIGH (ref 68–114)
GLUCOSE BLDC GLUCOMTR-MCNC: 108 MG/DL — HIGH (ref 70–99)
GLUCOSE BLDC GLUCOMTR-MCNC: 180 MG/DL — HIGH (ref 70–99)
GLUCOSE BLDC GLUCOMTR-MCNC: 180 MG/DL — HIGH (ref 70–99)
GLUCOSE BLDC GLUCOMTR-MCNC: 192 MG/DL — HIGH (ref 70–99)
GLUCOSE SERPL-MCNC: 124 MG/DL — HIGH (ref 70–99)
HCT VFR BLD CALC: 32.6 % — LOW (ref 34.5–45)
HGB BLD-MCNC: 10.1 G/DL — LOW (ref 11.5–15.5)
MCHC RBC-ENTMCNC: 29.2 PG — SIGNIFICANT CHANGE UP (ref 27–34)
MCHC RBC-ENTMCNC: 31 GM/DL — LOW (ref 32–36)
MCV RBC AUTO: 94.2 FL — SIGNIFICANT CHANGE UP (ref 80–100)
NRBC # BLD: 0 /100 WBCS — SIGNIFICANT CHANGE UP (ref 0–0)
PLATELET # BLD AUTO: 207 K/UL — SIGNIFICANT CHANGE UP (ref 150–400)
POTASSIUM SERPL-MCNC: 4.8 MMOL/L — SIGNIFICANT CHANGE UP (ref 3.5–5.3)
POTASSIUM SERPL-SCNC: 4.8 MMOL/L — SIGNIFICANT CHANGE UP (ref 3.5–5.3)
PROT SERPL-MCNC: 6.6 G/DL — SIGNIFICANT CHANGE UP (ref 6–8.3)
RBC # BLD: 3.46 M/UL — LOW (ref 3.8–5.2)
RBC # FLD: 13.8 % — SIGNIFICANT CHANGE UP (ref 10.3–14.5)
SODIUM SERPL-SCNC: 141 MMOL/L — SIGNIFICANT CHANGE UP (ref 135–145)
WBC # BLD: 6.06 K/UL — SIGNIFICANT CHANGE UP (ref 3.8–10.5)
WBC # FLD AUTO: 6.06 K/UL — SIGNIFICANT CHANGE UP (ref 3.8–10.5)

## 2022-02-23 PROCEDURE — 99233 SBSQ HOSP IP/OBS HIGH 50: CPT

## 2022-02-23 RX ADMIN — HEPARIN SODIUM 5000 UNIT(S): 5000 INJECTION INTRAVENOUS; SUBCUTANEOUS at 17:11

## 2022-02-23 RX ADMIN — Medication 81 MILLIGRAM(S): at 11:50

## 2022-02-23 RX ADMIN — Medication 250 MILLIGRAM(S): at 09:31

## 2022-02-23 RX ADMIN — Medication 5 MILLIGRAM(S): at 06:15

## 2022-02-23 RX ADMIN — Medication 1: at 16:48

## 2022-02-23 RX ADMIN — ATORVASTATIN CALCIUM 20 MILLIGRAM(S): 80 TABLET, FILM COATED ORAL at 21:45

## 2022-02-23 RX ADMIN — HEPARIN SODIUM 5000 UNIT(S): 5000 INJECTION INTRAVENOUS; SUBCUTANEOUS at 06:15

## 2022-02-23 RX ADMIN — Medication 1: at 11:49

## 2022-02-23 RX ADMIN — INSULIN GLARGINE 8 UNIT(S): 100 INJECTION, SOLUTION SUBCUTANEOUS at 21:45

## 2022-02-23 RX ADMIN — Medication 100 MICROGRAM(S): at 06:15

## 2022-02-23 RX ADMIN — Medication 1 APPLICATION(S): at 11:50

## 2022-02-23 NOTE — PROGRESS NOTE ADULT - ASSESSMENT
1. Left DM foot infection with ulcer and abscess. Improving with IV Vanco and expression of purulence.   2. DM with Neuropathy and moderate PAD.    PLAN:  1. Deep Culture and sensitivity taken of wound.  2. NSS irrigation.  3. Continue daily wound care with gentamycin cream  4. Continue IV Abx per ID.  5. Await vascular studies.

## 2022-02-23 NOTE — PROGRESS NOTE ADULT - SUBJECTIVE AND OBJECTIVE BOX
Patient seen for follow up of a left foot cellulitis and DM  ulcer. Patient denies pain, and is  tolerating IV ABx.  Afebrile and no pain.  WBC decreased. Reviewed chart and ID and Vascular consults. Discussed case with Dr. Ro earlier today. New appearance of purulent drainage in wound as of this morning.       Vital Signs Last 24 Hrs  T(C): 37 (23 Feb 2022 16:20), Max: 37 (23 Feb 2022 16:20)  T(F): 98.6 (23 Feb 2022 16:20), Max: 98.6 (23 Feb 2022 16:20)  HR: 66 (23 Feb 2022 16:20) (59 - 66)  BP: 133/44 (23 Feb 2022 16:20) (133/44 - 136/61)  BP(mean): --  RR: 14 (23 Feb 2022 16:20) (14 - 16)  SpO2: 99% (23 Feb 2022 16:20) (97% - 99%)                          10.1   6.06  )-----------( 207      ( 23 Feb 2022 06:15 )             32.6     02-23    141  |  105  |  17  ----------------------------<  124<H>  4.8   |  29  |  1.04    << from: Xray Foot AP + Lateral + Oblique, Left (02.22.22 @ 11:21) >  ACC: [id number] EXAM:  XR FOOT COMP MIN 3 VIEWS LT                          PROCEDURE DATE:  02/22/2022          INTERPRETATION:  LEFT FOOT: AP, LATERAL, OBLIQUE    CLINICAL INFORMATION: Left foot cellulitis.    COMPARISON:  None available    FINDINGS:    No acute fracture or focal osteolysis is noted.  The alignment at the tarsometatarsal joints remains within normal limits.    Soft tissue vascular calcification is present.    There is plantar calcaneal enthesopathy.    IMPRESSION:    Findings as discussed above.    --- End of Report ---      MICKEY MENDOZA MD; Attending Radiologist  This document has been electronically signed. Feb 22 2022  3:16PM    < end of copied text >    EXAM OF LEFT FOOT:     Significant decrease of edema, erythema and warmth. Ulcer 3mm x 3mm.  (+)purulent drainage expressed from wound. (-) foul odor.  Wound probes 0.5cm laterally and proximally.  (-) probe to bone.    Patient seen for follow up of a left foot cellulitis and DM  ulcer. Patient denies pain, and is  tolerating IV ABx.  Afebrile and no pain.  WBC decreased. Reviewed chart and ID and Vascular consults. Discussed case with Dr. Ro earlier today. New appearance of purulent drainage in wound as of this morning.       Vital Signs Last 24 Hrs  T(C): 37 (23 Feb 2022 16:20), Max: 37 (23 Feb 2022 16:20)  T(F): 98.6 (23 Feb 2022 16:20), Max: 98.6 (23 Feb 2022 16:20)  HR: 66 (23 Feb 2022 16:20) (59 - 66)  BP: 133/44 (23 Feb 2022 16:20) (133/44 - 136/61)  BP(mean): --  RR: 14 (23 Feb 2022 16:20) (14 - 16)  SpO2: 99% (23 Feb 2022 16:20) (97% - 99%)                          10.1   6.06  )-----------( 207      ( 23 Feb 2022 06:15 )             32.6     02-23    141  |  105  |  17  ----------------------------<  124<H>  4.8   |  29  |  1.04    Culture - Blood (collected 22 Feb 2022 11:07)  Source: .Blood Blood-Peripheral  Preliminary Report (23 Feb 2022 17:02):    No growth to date.    Culture - Blood (collected 22 Feb 2022 11:07)  Source: .Blood Blood-Peripheral  Preliminary Report (23 Feb 2022 17:02):    No growth to date.    << from: Xray Foot AP + Lateral + Oblique, Left (02.22.22 @ 11:21) >  ACC: [id number] EXAM:  XR FOOT COMP MIN 3 VIEWS LT                          PROCEDURE DATE:  02/22/2022          INTERPRETATION:  LEFT FOOT: AP, LATERAL, OBLIQUE    CLINICAL INFORMATION: Left foot cellulitis.    COMPARISON:  None available    FINDINGS:    No acute fracture or focal osteolysis is noted.  The alignment at the tarsometatarsal joints remains within normal limits.    Soft tissue vascular calcification is present.    There is plantar calcaneal enthesopathy.    IMPRESSION:    Findings as discussed above.    --- End of Report ---      MICKEY MENDOZA MD; Attending Radiologist  This document has been electronically signed. Feb 22 2022  3:16PM    < end of copied text >    EXAM OF LEFT FOOT:     Significant decrease of edema, erythema and warmth. Ulcer 3mm x 3mm.  (+)purulent drainage expressed from wound. (-) foul odor.  Wound probes 0.5cm laterally and proximally.  (-) probe to bone.

## 2022-02-23 NOTE — CONSULT NOTE ADULT - SUBJECTIVE AND OBJECTIVE BOX
History of Present Illness:  81y.o Female with a history of diabetes presents with diabetic infection of the left foot for 7- 1o days. The patient has a chronic callus overlying the left 1st metatarsal head on the plantar side of the foot. She was not responding to outpatient po antibiotics and was admitted for iv antibiotics and wound care. She has been draining pus from the callus wound. I was requested to evaluate her LE circulation. 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 She does admit to bilateral calf claudication at 2-3 blocks. She denies ischemic pedal pain at rest.    PAST MEDICAL & SURGICAL HISTORY:  Cancer of neck    HTN (hypertension)    Hypothyroidism    Diabetes    History of appendectomy    Elective surgery  s/p prolapse bladder repair        Allergies    penicillin (Unknown)  shellfish (Unknown)    Intolerances        MEDICATIONS  (STANDING):  aspirin  chewable 81 milliGRAM(s) Oral daily  atorvastatin 20 milliGRAM(s) Oral at bedtime  dextrose 40% Gel 15 Gram(s) Oral once  dextrose 5%. 1000 milliLiter(s) (50 mL/Hr) IV Continuous <Continuous>  dextrose 5%. 1000 milliLiter(s) (100 mL/Hr) IV Continuous <Continuous>  dextrose 50% Injectable 25 Gram(s) IV Push once  dextrose 50% Injectable 12.5 Gram(s) IV Push once  dextrose 50% Injectable 25 Gram(s) IV Push once  enalapril 5 milliGRAM(s) Oral daily  gentamicin 0.1% Ointment 1 Application(s) Topical daily  glucagon  Injectable 1 milliGRAM(s) IntraMuscular once  heparin   Injectable 5000 Unit(s) SubCutaneous every 12 hours  influenza  Vaccine (HIGH DOSE) 0.7 milliLiter(s) IntraMuscular once  insulin glargine Injectable (LANTUS) 8 Unit(s) SubCutaneous at bedtime  insulin lispro (ADMELOG) corrective regimen sliding scale   SubCutaneous three times a day before meals  insulin lispro (ADMELOG) corrective regimen sliding scale   SubCutaneous at bedtime  levothyroxine 100 MICROGram(s) Oral daily  vancomycin  IVPB 750 milliGRAM(s) IV Intermittent every 24 hours    MEDICATIONS  (PRN):  acetaminophen     Tablet .. 650 milliGRAM(s) Oral every 6 hours PRN Temp greater or equal to 38C (100.4F), Mild Pain (1 - 3)  aluminum hydroxide/magnesium hydroxide/simethicone Suspension 30 milliLiter(s) Oral every 4 hours PRN Dyspepsia  melatonin 3 milliGRAM(s) Oral at bedtime PRN Insomnia  ondansetron Injectable 4 milliGRAM(s) IV Push every 8 hours PRN Nausea and/or Vomiting      Social History:  Smoking History: stopped smoking 20 years ago  Alcohol Use: social    REVIEW OF SYSTEMS:  CONSTITUTIONAL: No weakness, fevers or chills  EYES/ENT: No visual changes;  No vertigo or throat pain   NECK: No pain or stiffness  RESPIRATORY: No cough, wheezing, hemoptysis; No shortness of breath  CARDIOVASCULAR: No chest pain or palpitations  GASTROINTESTINAL: No abdominal or epigastric pain. No nausea, vomiting, or hematemesis; No diarrhea or constipation. No melena or hematochezia.  GENITOURINARY: No dysuria, frequency or hematuria  NEUROLOGICAL: No numbness or weakness  SKIN: No itching, burning, rashes, or lesions   Vascular:  ++ lower extremity claudication. No  pedal rest pain. ++ infection left foot  All other review of systems is negative unless indicated above.    PHYSICAL EXAM:  General:  On exam, the patient is alert nontoxic appearing Female in no acute distress.  Vital Signs Last 24 Hrs  T(C): 36.6 (23 Feb 2022 05:12), Max: 38.1 (22 Feb 2022 11:25)  T(F): 97.8 (23 Feb 2022 05:12), Max: 100.5 (22 Feb 2022 11:25)  HR: 59 (23 Feb 2022 05:12) (59 - 80)  BP: 136/61 (23 Feb 2022 05:12) (126/62 - 151/63)  BP(mean): --  RR: 14 (23 Feb 2022 05:12) (14 - 16)  SpO2: 97% (23 Feb 2022 05:12) (96% - 99%)    Neck:  4+/4+ bilateral carotid pulses; no carotid bruit, no palpable cervical masses.  Heart:  Regular, no murmurs, rubs or gallops.    Lungs:  Clear to auscultation.    Chest:  No chest wall deformities. Symmetrical chest expansion.   Abdomen: Soft and nontender.  No palpable masses.  No rebound, guarding or rigidity.  Extremities:  There is a callus  and open wound on the plantar side of the left 1st metatarsal head with thick pus expressed with pressure. There is no plantar space abscess. Both feet are warm, pink with normal capillary refill times.  There are no heel decubiti.  The calf and thigh muscles are soft and nontender.  There are no palpable cords.  Garry's sign  is negative bilaterally.  There is no lower extremity edema, or cyanosis.  On examination of the peripheral pulses:  Left leg femoral pulse is 4/4    , popliteal pulse is 2/4  ,PT Pulse is 0  , DP Pulse is 2/4  Right leg femoral pulse is 4/4   ,popliteal pulse is 2/4  , PT Pulse is 0 , DP Pulse is 2/4  Neurological:  There are no motor or sensory deficits in either lower extremity.                          10.1   6.06  )-----------( 207      ( 23 Feb 2022 06:15 )             32.6     02-23    141  |  105  |  17  ----------------------------<  124<H>  4.8   |  29  |  1.04    Ca    8.9      23 Feb 2022 06:15    TPro  6.6  /  Alb  2.9<L>  /  TBili  0.2  /  DBili  x   /  AST  15  /  ALT  x   /  AlkPhos  69  02-23            Radiology:

## 2022-02-23 NOTE — PROGRESS NOTE ADULT - SUBJECTIVE AND OBJECTIVE BOX
Patient is a 81y old  Female who presents with a chief complaint of L foot redness and swelling (23 Feb 2022 08:20)  Afebrile overnight     Patient seen and examined at bedside.    ALLERGIES:  penicillin (Unknown)  shellfish (Unknown)    MEDICATIONS  (STANDING):  aspirin  chewable 81 milliGRAM(s) Oral daily  atorvastatin 20 milliGRAM(s) Oral at bedtime  dextrose 40% Gel 15 Gram(s) Oral once  dextrose 5%. 1000 milliLiter(s) (50 mL/Hr) IV Continuous <Continuous>  dextrose 5%. 1000 milliLiter(s) (100 mL/Hr) IV Continuous <Continuous>  dextrose 50% Injectable 25 Gram(s) IV Push once  dextrose 50% Injectable 12.5 Gram(s) IV Push once  dextrose 50% Injectable 25 Gram(s) IV Push once  enalapril 5 milliGRAM(s) Oral daily  gentamicin 0.1% Ointment 1 Application(s) Topical daily  glucagon  Injectable 1 milliGRAM(s) IntraMuscular once  heparin   Injectable 5000 Unit(s) SubCutaneous every 12 hours  influenza  Vaccine (HIGH DOSE) 0.7 milliLiter(s) IntraMuscular once  insulin glargine Injectable (LANTUS) 8 Unit(s) SubCutaneous at bedtime  insulin lispro (ADMELOG) corrective regimen sliding scale   SubCutaneous three times a day before meals  insulin lispro (ADMELOG) corrective regimen sliding scale   SubCutaneous at bedtime  levothyroxine 100 MICROGram(s) Oral daily  vancomycin  IVPB 750 milliGRAM(s) IV Intermittent every 24 hours    MEDICATIONS  (PRN):  acetaminophen     Tablet .. 650 milliGRAM(s) Oral every 6 hours PRN Temp greater or equal to 38C (100.4F), Mild Pain (1 - 3)  aluminum hydroxide/magnesium hydroxide/simethicone Suspension 30 milliLiter(s) Oral every 4 hours PRN Dyspepsia  melatonin 3 milliGRAM(s) Oral at bedtime PRN Insomnia  ondansetron Injectable 4 milliGRAM(s) IV Push every 8 hours PRN Nausea and/or Vomiting    Vital Signs Last 24 Hrs  T(F): 97.8 (23 Feb 2022 05:12), Max: 98.1 (22 Feb 2022 16:44)  HR: 59 (23 Feb 2022 05:12) (59 - 72)  BP: 136/61 (23 Feb 2022 05:12) (126/62 - 151/63)  RR: 14 (23 Feb 2022 05:12) (14 - 16)  SpO2: 97% (23 Feb 2022 05:12) (96% - 99%)  I&O's Summary    23 Feb 2022 07:01  -  23 Feb 2022 11:43  --------------------------------------------------------  IN: 500 mL / OUT: 0 mL / NET: 500 mL      BMI (kg/m2): 24.5 (02-22-22 @ 13:58)  PHYSICAL EXAM:  General: NAD, A/O x 3  ENT: MMM, no thrush  Neck: Supple, No JVD  Lungs: Non labored breathing,  Clear to auscultation bilaterally,   Cardio: RRR, S1/S2, no pitting edema bilaterally  Abdomen: Soft, Nontender, Nondistended; Bowel sounds present  Extremities: No calf tenderness, L foot with plantar shallow ulcer, mild erythema     LABS:                        10.1   6.06  )-----------( 207      ( 23 Feb 2022 06:15 )             32.6       02-23    141  |  105  |  17  ----------------------------<  124  4.8   |  29  |  1.04    Ca    8.9      23 Feb 2022 06:15    TPro  6.6  /  Alb  2.9  /  TBili  0.2  /  DBili  x   /  AST  15  /  ALT  12  /  AlkPhos  69  02-23     eGFR if Non African American: 50 mL/min/1.73M2 (02-23-22 @ 06:15)  eGFR if African American: 58 mL/min/1.73M2 (02-23-22 @ 06:15)                           POCT Blood Glucose.: 108 mg/dL (23 Feb 2022 08:04)  POCT Blood Glucose.: 135 mg/dL (22 Feb 2022 21:18)  POCT Blood Glucose.: 157 mg/dL (22 Feb 2022 16:38)          COVID-19 PCR: NotDetec (02-22-22 @ 11:30)      RADIOLOGY & ADDITIONAL TESTS:    Care Discussed with Consultants/Other Providers:

## 2022-02-23 NOTE — PHARMACOTHERAPY INTERVENTION NOTE - COMMENTS
discussed current medications with patient   patient expressed understanding  all questions answered

## 2022-02-23 NOTE — CONSULT NOTE ADULT - ASSESSMENT
81 y.o. female presents with a diabetic ulcer and infection of the left foot. She has moderate PAD in both legs. I will order LUIS studies for better evaluation of her LE circulation. I agree  with IV antibiotics and local wound care as per Dr. Carrillo

## 2022-02-23 NOTE — PROGRESS NOTE ADULT - ASSESSMENT
full note to follow 81y female with hx Type 2 Diabetes Mellitus, Hypertension, Hyperlipidemia, Anemia, Hypothyroidism who presents to the ER complaining of worsening left foot swelling and redness x 5days. Patient noticed L foot swelling and redness last week thursday. She states she was placed on oral bactrim by her podiatrist,  but then yesterday swelling and redness progressed and hence she came to ER. She denies fever, chills at home, here Tmax 100.5, receiving IV Vancomycin and feels improvement. She states allergies to PCN    PLAN  Will cont IV Vanc for now--presentation suggestive of beta-hemolytic strep, but polymicrobial infections in diabetics not uncommon  ? hx of PCN allergies reviewed with pt  f/u cultures  podiatry and vascular input appreciated  monitor WBC, fevers and ESR  Keep leg elevated

## 2022-02-23 NOTE — PROGRESS NOTE ADULT - ASSESSMENT
81y year old Female with PMH of Type 2 Diabetes Mellitus, Hypertension, Hyperlipidemia, Anemia, Hypothyroidism who presents to the ER complaining of worsening left foot swelling and redness x 5days.     # Diabetic foot wound  - failed outpatient bactrim  - Podiatry - Dr plaza following-wound care orders appreciated  - Cont IV Vanco -day 2  - Fup cultures  - Vascular following- Dr Ro--will order LUIS  - ID following    # Type 2 Diabetes Mellitus   - HbA1c 7,1  - FS and ANA MARIA  - Reduced home Lantus 12u to 8u qhs    #PAD:  vascular following  Will order LUIS     # Hypertension   # Hyperlipidemia   # Hypothyroidism  - c/w home meds - enalapril, Lipitor (therapeutic exchange), synthroid    # DVT Prophylaxis:  - Heparin    Code status: Full Code     Dispo: home when medically stable     **Patient will inform family

## 2022-02-23 NOTE — PROGRESS NOTE ADULT - SUBJECTIVE AND OBJECTIVE BOX
CC: f/u for    Patient reports    REVIEW OF SYSTEMS: no fevers, no chills, no nausea, no vomiting, no abd pain, no resp symptoms  All other review of systems negative (Comprehensive ROS)    Antimicrobials Day #    vancomycin  IVPB 750 milliGRAM(s) IV Intermittent every 24 hours    Other Medications Reviewed    T(F): 97.8 (02-23-22 @ 05:12), Max: 98.1 (02-22-22 @ 16:44)  HR: 59 (02-23-22 @ 05:12)  BP: 136/61 (02-23-22 @ 05:12)  RR: 14 (02-23-22 @ 05:12)  SpO2: 97% (02-23-22 @ 05:12)    PHYSICAL EXAM:  General: alert, no acute distress  Eyes:  anicteric, no conjunctival injection, no discharge  Oropharynx: no lesions or injection 	  Neck: supple, without adenopathy  Lungs: clear to auscultation  Heart: regular rate and rhythm; no murmur, rubs or gallops  Abdomen: soft, nondistended, nontender, without mass or organomegaly  Skin: no lesions  Extremities: no clubbing, cyanosis, or edema  Neurologic: alert, oriented, moves all extremities    LAB RESULTS:                        10.1   6.06  )-----------( 207      ( 23 Feb 2022 06:15 )             32.6     02-23    141  |  105  |  17  ----------------------------<  124<H>  4.8   |  29  |  1.04    Ca    8.9      23 Feb 2022 06:15    TPro  6.6  /  Alb  2.9<L>  /  TBili  0.2  /  DBili  x   /  AST  15  /  ALT  12  /  AlkPhos  69  02-23    LIVER FUNCTIONS - ( 23 Feb 2022 06:15 )  Alb: 2.9 g/dL / Pro: 6.6 g/dL / ALK PHOS: 69 U/L / ALT: 12 U/L / AST: 15 U/L / GGT: x               MICROBIOLOGY REVIEWED:      RADIOLOGY REVIEWED:  < from: Xray Foot AP + Lateral + Oblique, Left (02.22.22 @ 11:21) >  FINDINGS:    No acute fracture or focal osteolysis is noted.  The alignment at the tarsometatarsal joints remains within normal limits.    Soft tissue vascular calcification is present.    There is plantar calcaneal enthesopathy.    IMPRESSION:    Findings as discussed above.    < end of copied text >     CC: f/u for LLE cellulitis    Patient reports no fevers, feels better    REVIEW OF SYSTEMS: no fevers, no chills, no nausea, no vomiting, no abd pain, no resp symptoms  All other review of systems negative (Comprehensive ROS)    Antimicrobials Day #  2  vancomycin  IVPB 750 milliGRAM(s) IV Intermittent every 24 hours    Other Medications Reviewed    T(F): 97.8 (02-23-22 @ 05:12), Max: 98.1 (02-22-22 @ 16:44)  HR: 59 (02-23-22 @ 05:12)  BP: 136/61 (02-23-22 @ 05:12)  RR: 14 (02-23-22 @ 05:12)  SpO2: 97% (02-23-22 @ 05:12)    PHYSICAL EXAM:  General: alert, no acute distress  Eyes:  anicteric, no conjunctival injection, no discharge  Oropharynx: no lesions or injection 	  Neck: supple, without adenopathy  Lungs: clear to auscultation  Heart: regular rate and rhythm; no murmur, rubs or gallops  Abdomen: soft, nondistended, nontender, without mass or organomegaly  Skin: no lesions  Extremities: L foot with resolving erythema and edema, plantar shallow ulcer, no purulence noted  Neurologic: alert, oriented, moves all extremities    LAB RESULTS:                        10.1   6.06  )-----------( 207      ( 23 Feb 2022 06:15 )             32.6     02-23    141  |  105  |  17  ----------------------------<  124<H>  4.8   |  29  |  1.04    Ca    8.9      23 Feb 2022 06:15    TPro  6.6  /  Alb  2.9<L>  /  TBili  0.2  /  DBili  x   /  AST  15  /  ALT  12  /  AlkPhos  69  02-23    LIVER FUNCTIONS - ( 23 Feb 2022 06:15 )  Alb: 2.9 g/dL / Pro: 6.6 g/dL / ALK PHOS: 69 U/L / ALT: 12 U/L / AST: 15 U/L / GGT: x               MICROBIOLOGY REVIEWED:      RADIOLOGY REVIEWED:  < from: Xray Foot AP + Lateral + Oblique, Left (02.22.22 @ 11:21) >  FINDINGS:    No acute fracture or focal osteolysis is noted.  The alignment at the tarsometatarsal joints remains within normal limits.    Soft tissue vascular calcification is present.    There is plantar calcaneal enthesopathy.    IMPRESSION:    Findings as discussed above.    < end of copied text >

## 2022-02-24 LAB
ANION GAP SERPL CALC-SCNC: 7 MMOL/L — SIGNIFICANT CHANGE UP (ref 5–17)
BUN SERPL-MCNC: 26 MG/DL — HIGH (ref 7–23)
CALCIUM SERPL-MCNC: 9.1 MG/DL — SIGNIFICANT CHANGE UP (ref 8.4–10.5)
CHLORIDE SERPL-SCNC: 103 MMOL/L — SIGNIFICANT CHANGE UP (ref 96–108)
CO2 SERPL-SCNC: 29 MMOL/L — SIGNIFICANT CHANGE UP (ref 22–31)
CREAT SERPL-MCNC: 1.24 MG/DL — SIGNIFICANT CHANGE UP (ref 0.5–1.3)
GLUCOSE BLDC GLUCOMTR-MCNC: 128 MG/DL — HIGH (ref 70–99)
GLUCOSE BLDC GLUCOMTR-MCNC: 135 MG/DL — HIGH (ref 70–99)
GLUCOSE BLDC GLUCOMTR-MCNC: 172 MG/DL — HIGH (ref 70–99)
GLUCOSE BLDC GLUCOMTR-MCNC: 173 MG/DL — HIGH (ref 70–99)
GLUCOSE SERPL-MCNC: 131 MG/DL — HIGH (ref 70–99)
HCT VFR BLD CALC: 31.7 % — LOW (ref 34.5–45)
HGB BLD-MCNC: 10.2 G/DL — LOW (ref 11.5–15.5)
MCHC RBC-ENTMCNC: 29.2 PG — SIGNIFICANT CHANGE UP (ref 27–34)
MCHC RBC-ENTMCNC: 32.2 GM/DL — SIGNIFICANT CHANGE UP (ref 32–36)
MCV RBC AUTO: 90.8 FL — SIGNIFICANT CHANGE UP (ref 80–100)
NRBC # BLD: 0 /100 WBCS — SIGNIFICANT CHANGE UP (ref 0–0)
PLATELET # BLD AUTO: 265 K/UL — SIGNIFICANT CHANGE UP (ref 150–400)
POTASSIUM SERPL-MCNC: 4.6 MMOL/L — SIGNIFICANT CHANGE UP (ref 3.5–5.3)
POTASSIUM SERPL-SCNC: 4.6 MMOL/L — SIGNIFICANT CHANGE UP (ref 3.5–5.3)
RBC # BLD: 3.49 M/UL — LOW (ref 3.8–5.2)
RBC # FLD: 13.7 % — SIGNIFICANT CHANGE UP (ref 10.3–14.5)
SODIUM SERPL-SCNC: 139 MMOL/L — SIGNIFICANT CHANGE UP (ref 135–145)
WBC # BLD: 7.3 K/UL — SIGNIFICANT CHANGE UP (ref 3.8–10.5)
WBC # FLD AUTO: 7.3 K/UL — SIGNIFICANT CHANGE UP (ref 3.8–10.5)

## 2022-02-24 PROCEDURE — 99233 SBSQ HOSP IP/OBS HIGH 50: CPT

## 2022-02-24 RX ADMIN — HEPARIN SODIUM 5000 UNIT(S): 5000 INJECTION INTRAVENOUS; SUBCUTANEOUS at 17:05

## 2022-02-24 RX ADMIN — INSULIN GLARGINE 8 UNIT(S): 100 INJECTION, SOLUTION SUBCUTANEOUS at 22:05

## 2022-02-24 RX ADMIN — Medication 250 MILLIGRAM(S): at 12:42

## 2022-02-24 RX ADMIN — Medication 1 APPLICATION(S): at 12:43

## 2022-02-24 RX ADMIN — ATORVASTATIN CALCIUM 20 MILLIGRAM(S): 80 TABLET, FILM COATED ORAL at 21:42

## 2022-02-24 RX ADMIN — Medication 1: at 17:04

## 2022-02-24 RX ADMIN — Medication 100 MICROGRAM(S): at 05:57

## 2022-02-24 RX ADMIN — Medication 5 MILLIGRAM(S): at 05:58

## 2022-02-24 RX ADMIN — Medication 81 MILLIGRAM(S): at 12:43

## 2022-02-24 RX ADMIN — HEPARIN SODIUM 5000 UNIT(S): 5000 INJECTION INTRAVENOUS; SUBCUTANEOUS at 05:57

## 2022-02-24 NOTE — PROGRESS NOTE ADULT - SUBJECTIVE AND OBJECTIVE BOX
Patient is a 81y old  Female who presents with a chief complaint of L foot redness and swelling (24 Feb 2022 10:35)      Patient seen and examined at bedside. Pt states she is feeling well, with intermittent pain to left foot when expressed. Denies overnight events or current complaints including chest pain, shortness of breath, dizziness, nausea, vomiting, diarrhea, fever or chills.    ALLERGIES:  penicillin (Unknown)  shellfish (Unknown)    MEDICATIONS  (STANDING):  aspirin  chewable 81 milliGRAM(s) Oral daily  atorvastatin 20 milliGRAM(s) Oral at bedtime  dextrose 40% Gel 15 Gram(s) Oral once  dextrose 5%. 1000 milliLiter(s) (50 mL/Hr) IV Continuous <Continuous>  dextrose 5%. 1000 milliLiter(s) (100 mL/Hr) IV Continuous <Continuous>  dextrose 50% Injectable 25 Gram(s) IV Push once  dextrose 50% Injectable 12.5 Gram(s) IV Push once  dextrose 50% Injectable 25 Gram(s) IV Push once  enalapril 5 milliGRAM(s) Oral daily  gentamicin 0.1% Ointment 1 Application(s) Topical daily  glucagon  Injectable 1 milliGRAM(s) IntraMuscular once  heparin   Injectable 5000 Unit(s) SubCutaneous every 12 hours  influenza  Vaccine (HIGH DOSE) 0.7 milliLiter(s) IntraMuscular once  insulin glargine Injectable (LANTUS) 8 Unit(s) SubCutaneous at bedtime  insulin lispro (ADMELOG) corrective regimen sliding scale   SubCutaneous three times a day before meals  insulin lispro (ADMELOG) corrective regimen sliding scale   SubCutaneous at bedtime  levothyroxine 100 MICROGram(s) Oral daily  vancomycin  IVPB 750 milliGRAM(s) IV Intermittent every 24 hours    MEDICATIONS  (PRN):  acetaminophen     Tablet .. 650 milliGRAM(s) Oral every 6 hours PRN Temp greater or equal to 38C (100.4F), Mild Pain (1 - 3)  aluminum hydroxide/magnesium hydroxide/simethicone Suspension 30 milliLiter(s) Oral every 4 hours PRN Dyspepsia  melatonin 3 milliGRAM(s) Oral at bedtime PRN Insomnia  ondansetron Injectable 4 milliGRAM(s) IV Push every 8 hours PRN Nausea and/or Vomiting    Vital Signs Last 24 Hrs  T(F): 97.7 (24 Feb 2022 05:50), Max: 98.8 (23 Feb 2022 20:12)  HR: 63 (24 Feb 2022 05:50) (63 - 66)  BP: 157/61 (24 Feb 2022 05:50) (133/44 - 157/61)  RR: 17 (24 Feb 2022 05:50) (14 - 17)  SpO2: 97% (24 Feb 2022 05:50) (97% - 99%)  I&O's Summary    23 Feb 2022 07:01  -  24 Feb 2022 07:00  --------------------------------------------------------  IN: 1140 mL / OUT: 0 mL / NET: 1140 mL    24 Feb 2022 07:01  -  24 Feb 2022 13:56  --------------------------------------------------------  IN: 400 mL / OUT: 0 mL / NET: 400 mL      PHYSICAL EXAM:  General: NAD, A/O x 3  ENT: MMM, no oral thrush   Neck: Supple, No JVD  Lungs: Clear to auscultation bilaterally, non labored breathing  Cardio: RRR, S1/S2, No murmurs  Abdomen: Soft, Nontender, Nondistended; Bowel sounds present  Extremities: Left foot wrapped. No calf tenderness, No pitting edema    LABS:                        10.2   7.30  )-----------( 265      ( 24 Feb 2022 06:18 )             31.7     02-24    139  |  103  |  26  ----------------------------<  131  4.6   |  29  |  1.24    Ca    9.1      24 Feb 2022 06:18    TPro  6.6  /  Alb  2.9  /  TBili  0.2  /  DBili  x   /  AST  15  /  ALT  12  /  AlkPhos  69  02-23    eGFR if Non African American: 41 mL/min/1.73M2 (02-24-22 @ 06:18)  eGFR if African American: 47 mL/min/1.73M2 (02-24-22 @ 06:18)                POCT Blood Glucose.: 128 mg/dL (24 Feb 2022 12:39)  POCT Blood Glucose.: 135 mg/dL (24 Feb 2022 07:54)  POCT Blood Glucose.: 192 mg/dL (23 Feb 2022 21:42)  POCT Blood Glucose.: 180 mg/dL (23 Feb 2022 16:47)          Culture - Blood (collected 22 Feb 2022 11:07)  Source: .Blood Blood-Peripheral  Preliminary Report (23 Feb 2022 17:02):    No growth to date.    Culture - Blood (collected 22 Feb 2022 11:07)  Source: .Blood Blood-Peripheral  Preliminary Report (23 Feb 2022 17:02):    No growth to date.      COVID-19 PCR: NotDetec (02-22-22 @ 11:30)      RADIOLOGY & ADDITIONAL TESTS:    < from: Xray Tibia + Fibula 2 Views, Left (02.22.22 @ 11:21) >    ACC: [id number] EXAM:  XR TIB FIB AP LAT 2 VIEWS LT                          PROCEDURE DATE:  02/22/2022          INTERPRETATION:  Clinical information: Left leg cellulitis.    4 views of the left tibia and fibula.    No focal osteolysis or acute fracture is noted.    Soft tissue vascular calcification is present.    IMPRESSION:    Findings as discussed above.    --- End of Report ---            MICKEY MENDOZA MD; Attending Radiologist  This document has been electronically signed. Feb 22 2022  3:25PM    < end of copied text >    Care Discussed with Consultants/Other Providers:

## 2022-02-24 NOTE — PROGRESS NOTE ADULT - SUBJECTIVE AND OBJECTIVE BOX
CC: f/u for LLE cellulitis    Patient reports no fevers, feels better    REVIEW OF SYSTEMS: no fevers, no chills, no nausea, no vomiting, no abd pain, no resp symptoms  All other review of systems negative (Comprehensive ROS)    Antimicrobials Day #  3  vancomycin  IVPB 750 milliGRAM(s) IV Intermittent every 24 hours    Other Medications Reviewed    T(F): 97.8 (02-23-22 @ 05:12), Max: 98.1 (02-22-22 @ 16:44)  HR: 59 (02-23-22 @ 05:12)  BP: 136/61 (02-23-22 @ 05:12)  RR: 14 (02-23-22 @ 05:12)  SpO2: 97% (02-23-22 @ 05:12)    PHYSICAL EXAM:  General: alert, no acute distress  Eyes:  anicteric, no conjunctival injection, no discharge  Oropharynx: no lesions or injection 	  Neck: supple, without adenopathy  Lungs: clear to auscultation  Heart: regular rate and rhythm; no murmur, rubs or gallops  Abdomen: soft, nondistended, nontender, without mass or organomegaly  Skin: no lesions  Extremities: L foot with resolving erythema and edema, plantar shallow ulcer, no purulence noted  Neurologic: alert, oriented, moves all extremities    LAB RESULTS:                                   10.2   7.30  )-----------( 265      ( 24 Feb 2022 06:18 )             31.7   02-24    139  |  103  |  26<H>  ----------------------------<  131<H>  4.6   |  29  |  1.24    Ca    9.1      24 Feb 2022 06:18    TPro  6.6  /  Alb  2.9<L>  /  TBili  0.2  /  DBili  x   /  AST  15  /  ALT  12  /  AlkPhos  69  02-23          MICROBIOLOGY REVIEWED:      RADIOLOGY REVIEWED:  < from: Xray Foot AP + Lateral + Oblique, Left (02.22.22 @ 11:21) >  FINDINGS:    No acute fracture or focal osteolysis is noted.  The alignment at the tarsometatarsal joints remains within normal limits.    Soft tissue vascular calcification is present.    There is plantar calcaneal enthesopathy.    IMPRESSION:    Findings as discussed above.    < end of copied text >

## 2022-02-24 NOTE — PROGRESS NOTE ADULT - ASSESSMENT
81y year old Female with PMH of Type 2 Diabetes Mellitus, Hypertension, Hyperlipidemia, Anemia, Hypothyroidism who presents to the ER complaining of worsening left foot swelling and redness x 5days.     # Diabetic foot wound  - failed outpatient bactrim  - Podiatry - Dr plaza following-wound care orders appreciated  - Cont IV Vanco -day 3  -Gentamycin cream   - BC NGTD. FU wound cultures   - Vascular following- Dr Ro. FU Vascular studies   - ID following    # Type 2 Diabetes Mellitus   - HbA1c 7,1  - FS and ANA MARIA  - Continue reduced home Lantus at 8u qhs    #PAD:  vascular following  FU LUIS     # Hypertension   # Hyperlipidemia   # Hypothyroidism  - c/w home meds - enalapril, Lipitor (therapeutic exchange), synthroid    # DVT Prophylaxis:  - Heparin    Code status: Full Code     Dispo: home when medically stable     **Patient prefers to inform family

## 2022-02-24 NOTE — PROGRESS NOTE ADULT - SUBJECTIVE AND OBJECTIVE BOX
Patient seen for follow up of a left foot cellulitis and DM  ulcer. Patient denies pain, and is  tolerating IV ABx.  Afebrile and no pain.  WBC decreased. Reviewed chart and ID and Vascular consults. Discussed case with Dr. Espinoza/ID earlier today.     Vital Signs Last 24 Hrs  T(C): 36.7 (24 Feb 2022 16:25), Max: 37.1 (23 Feb 2022 20:12)  T(F): 98 (24 Feb 2022 16:25), Max: 98.8 (23 Feb 2022 20:12)  HR: 65 (24 Feb 2022 16:25) (63 - 66)  BP: 152/60 (24 Feb 2022 16:25) (149/54 - 157/61)  BP(mean): --  RR: 18 (24 Feb 2022 16:25) (16 - 18)  SpO2: 100% (24 Feb 2022 16:25) (97% - 100%)                          10.2   7.30  )-----------( 265      ( 24 Feb 2022 06:18 )             31.7       02-24    139  |  103  |  26<H>  ----------------------------<  131<H>  4.6   |  29  |  1.24        Culture - Blood (collected 22 Feb 2022 11:07)  Source: .Blood Blood-Peripheral  Preliminary Report (23 Feb 2022 17:02):    No growth to date.    Culture - Blood (collected 22 Feb 2022 11:07)  Source: .Blood Blood-Peripheral  Preliminary Report (23 Feb 2022 17:02):    No growth to date.    Culture taken 02/23/2022 still pending    EXAM OF LEFT FOOT:     Significant decrease of edema, erythema and warmth. Now confided to the area around the wound. Cellulitis of reminder of foot and ankle resolved.  Ulcer 3mm x 3mm.  (-) purulent drainage expressed from wound today. (-) foul odor.  Wound probes 0.5cm laterally and proximally.  (-) probe to bone.

## 2022-02-24 NOTE — PROGRESS NOTE ADULT - ASSESSMENT
Assessment and Plan:   · Assessment	  1. Left DM foot infection with ulcer and abscess. Improving with IV Vanco and expression of purulence. Cellulitis largely resolved except for neema-ulcerative area.    2. DM with Neuropathy and moderate PAD.    PLAN:  1. Deep Culture from 02/23 pending.  2. NSS irrigation.  3. Continue daily wound care with gentamycin cream  4. Continue IV Abx per ID--await  cultures to determine PO ABx for D/C.   5. Await vascular studies.

## 2022-02-24 NOTE — DIETITIAN INITIAL EVALUATION ADULT. - OTHER INFO
pt,. feeling better. denieis any n/v/ diarrhea. minimal dysphagia; able to manage regular food. tolerates the cons.  cho  diet. diet reviewed /reinforced. understanding is very good. S/P CA neck, DM2. weight stable. left foot wound; no pressure sores. edema noted left leg left ankle. last bm was 2/23.

## 2022-02-24 NOTE — PROGRESS NOTE ADULT - ASSESSMENT
81y female with hx Type 2 Diabetes Mellitus, Hypertension, Hyperlipidemia, Anemia, Hypothyroidism who presents to the ER complaining of worsening left foot swelling and redness x 5days. Patient noticed L foot swelling and redness last week thursday. She states she was placed on oral bactrim by her podiatrist,  but then yesterday swelling and redness progressed and hence she came to ER. She denies fever, chills at home, here Tmax 100.5, receiving IV Vancomycin and feels improvement. She states allergies to PCN    PLAN  Will cont IV Vanc for now--presentation suggestive of beta-hemolytic strep, but polymicrobial infections in diabetics not uncommon  ? hx of PCN allergies reviewed with pt  f/u cultures  podiatry and vascular input appreciated  monitor WBC, fevers and ESR  Keep leg elevated  d/w Dr Carrillo--deep cultures obtained, will f/u results  Oral doxy may be an option if cont to improve

## 2022-02-25 ENCOUNTER — TRANSCRIPTION ENCOUNTER (OUTPATIENT)
Age: 82
End: 2022-02-25

## 2022-02-25 VITALS
TEMPERATURE: 98 F | SYSTOLIC BLOOD PRESSURE: 144 MMHG | RESPIRATION RATE: 16 BRPM | HEART RATE: 75 BPM | OXYGEN SATURATION: 97 % | DIASTOLIC BLOOD PRESSURE: 66 MMHG

## 2022-02-25 LAB
GLUCOSE BLDC GLUCOMTR-MCNC: 131 MG/DL — HIGH (ref 70–99)
GLUCOSE BLDC GLUCOMTR-MCNC: 97 MG/DL — SIGNIFICANT CHANGE UP (ref 70–99)
VANCOMYCIN TROUGH SERPL-MCNC: 7.5 UG/ML — LOW (ref 10–20)

## 2022-02-25 PROCEDURE — 93923 UPR/LXTR ART STDY 3+ LVLS: CPT

## 2022-02-25 PROCEDURE — 99285 EMERGENCY DEPT VISIT HI MDM: CPT

## 2022-02-25 PROCEDURE — 82962 GLUCOSE BLOOD TEST: CPT

## 2022-02-25 PROCEDURE — 85025 COMPLETE CBC W/AUTO DIFF WBC: CPT

## 2022-02-25 PROCEDURE — 87040 BLOOD CULTURE FOR BACTERIA: CPT

## 2022-02-25 PROCEDURE — 87635 SARS-COV-2 COVID-19 AMP PRB: CPT

## 2022-02-25 PROCEDURE — 83036 HEMOGLOBIN GLYCOSYLATED A1C: CPT

## 2022-02-25 PROCEDURE — 80202 ASSAY OF VANCOMYCIN: CPT

## 2022-02-25 PROCEDURE — 99239 HOSP IP/OBS DSCHRG MGMT >30: CPT

## 2022-02-25 PROCEDURE — 93005 ELECTROCARDIOGRAM TRACING: CPT

## 2022-02-25 PROCEDURE — 96365 THER/PROPH/DIAG IV INF INIT: CPT

## 2022-02-25 PROCEDURE — 87070 CULTURE OTHR SPECIMN AEROBIC: CPT

## 2022-02-25 PROCEDURE — 36415 COLL VENOUS BLD VENIPUNCTURE: CPT

## 2022-02-25 PROCEDURE — 80053 COMPREHEN METABOLIC PANEL: CPT

## 2022-02-25 PROCEDURE — 80048 BASIC METABOLIC PNL TOTAL CA: CPT

## 2022-02-25 PROCEDURE — 73590 X-RAY EXAM OF LOWER LEG: CPT

## 2022-02-25 PROCEDURE — 73630 X-RAY EXAM OF FOOT: CPT

## 2022-02-25 PROCEDURE — 87077 CULTURE AEROBIC IDENTIFY: CPT

## 2022-02-25 PROCEDURE — 85027 COMPLETE CBC AUTOMATED: CPT

## 2022-02-25 PROCEDURE — 71045 X-RAY EXAM CHEST 1 VIEW: CPT

## 2022-02-25 RX ORDER — GENTAMICIN SULFATE 0.1 %
1 OINTMENT (GRAM) TOPICAL
Qty: 1 | Refills: 0
Start: 2022-02-25 | End: 2022-03-06

## 2022-02-25 RX ORDER — ASPIRIN/CALCIUM CARB/MAGNESIUM 324 MG
0 TABLET ORAL
Qty: 0 | Refills: 0 | DISCHARGE

## 2022-02-25 RX ADMIN — Medication 100 MICROGRAM(S): at 05:37

## 2022-02-25 RX ADMIN — Medication 250 MILLIGRAM(S): at 11:50

## 2022-02-25 RX ADMIN — Medication 81 MILLIGRAM(S): at 11:50

## 2022-02-25 RX ADMIN — HEPARIN SODIUM 5000 UNIT(S): 5000 INJECTION INTRAVENOUS; SUBCUTANEOUS at 05:37

## 2022-02-25 RX ADMIN — Medication 1 APPLICATION(S): at 11:50

## 2022-02-25 RX ADMIN — Medication 5 MILLIGRAM(S): at 05:37

## 2022-02-25 NOTE — PROGRESS NOTE ADULT - PROVIDER SPECIALTY LIST ADULT
Podiatry
Hospitalist
Infectious Disease
Hospitalist
Infectious Disease
Infectious Disease
Podiatry
Podiatry
Hospitalist

## 2022-02-25 NOTE — DISCHARGE NOTE PROVIDER - HOSPITAL COURSE
Hospital Course  81y year old Female with PMH of Type 2 Diabetes Mellitus, Hypertension, Hyperlipidemia, Anemia, Hypothyroidism who presents to the ER complaining of worsening left foot swelling and redness x 5days.   Pt admitted and treated for Diabetic left foot wound with ulcer/abscess.  She failed outpatient oral bactrim; completed 4 days of IV Vanco while in hospital.  She was evaluated by ID, Podiatry and Vascular.   She had imaging of left foot; which showed no signs of Osteomyelitis.  Pt. had wound culture sent; pt with +Staph Epi.   BC with NGTD.    Pt. medically improved and stable for D/C home on oral antibx.    Code status: Full Code     Source of Infection:  left foot, staph  Antibiotic / Last Day: 10 more days of antibx; Doxy 100 mg. bid    Discharging Provider:  NANCY Parker  Contact Info: Cell [phone] - Please call with any questions or concerns.    Outpatient Provider:  Dr Plasencia, Dr Carrillo           Hospital Course:    82 y/o F with PMH of Type 2 Diabetes Mellitus, Hypertension, Hyperlipidemia, Anemia, Hypothyroidism who presents to the ER complaining of worsening left foot swelling and redness x 5days.   Pt admitted and treated for Diabetic left foot wound with ulcer/abscess.  She failed outpatient oral bactrim; completed 4 days of IV Vanco while in hospital.  She was evaluated by ID, Podiatry and Vascular.   She had imaging of left foot; which showed no signs of Osteomyelitis.  Pt. had wound culture sent; pt with +Staph Epi.   BC with NGTD.    Pt. medically improved and stable for D/C home on oral antibx.    Code status: Full Code     Source of Infection:  left foot, staph  Antibiotic / Last Day: 10 more days of antibx; Doxy 100 mg. bid    Discharging Provider:  NANCY Parker; Linda Kirk DO  Contact Info: Cell [phone] - Please call with any questions or concerns.    Outpatient Provider:  Dr Plasencia, Dr Carrillo  Time Spent: 45 minutes

## 2022-02-25 NOTE — PROGRESS NOTE ADULT - SUBJECTIVE AND OBJECTIVE BOX
CC: f/u for LLE cellulitis    Patient reports no fevers, feels better    REVIEW OF SYSTEMS: no fevers, no chills, no nausea, no vomiting, no abd pain, no resp symptoms  All other review of systems negative (Comprehensive ROS)    Antimicrobials Day #  4  vancomycin  IVPB 750 milliGRAM(s) IV Intermittent every 24 hours    Other Medications Reviewed    T(F): 97.8 (02-23-22 @ 05:12), Max: 98.1 (02-22-22 @ 16:44)  HR: 59 (02-23-22 @ 05:12)  BP: 136/61 (02-23-22 @ 05:12)  RR: 14 (02-23-22 @ 05:12)  SpO2: 97% (02-23-22 @ 05:12)    PHYSICAL EXAM:  General: alert, no acute distress  Eyes:  anicteric, no conjunctival injection, no discharge  Oropharynx: no lesions or injection 	  Neck: supple, without adenopathy  Lungs: clear to auscultation  Heart: regular rate and rhythm; no murmur, rubs or gallops  Abdomen: soft, nondistended, nontender, without mass or organomegaly  Skin: no lesions  Extremities: L foot with resolving erythema and edema, plantar shallow ulcer, no purulence noted  Neurologic: alert, oriented, moves all extremities    LAB RESULTS:                                   10.2   7.30  )-----------( 265      ( 24 Feb 2022 06:18 )             31.7   02-24    139  |  103  |  26<H>  ----------------------------<  131<H>  4.6   |  29  |  1.24    Ca    9.1      24 Feb 2022 06:18    TPro  6.6  /  Alb  2.9<L>  /  TBili  0.2  /  DBili  x   /  AST  15  /  ALT  12  /  AlkPhos  69  02-23          MICROBIOLOGY REVIEWED:      RADIOLOGY REVIEWED:  < from: Xray Foot AP + Lateral + Oblique, Left (02.22.22 @ 11:21) >  FINDINGS:    No acute fracture or focal osteolysis is noted.  The alignment at the tarsometatarsal joints remains within normal limits.    Soft tissue vascular calcification is present.    There is plantar calcaneal enthesopathy.    IMPRESSION:    Findings as discussed above.    < end of copied text >

## 2022-02-25 NOTE — DISCHARGE NOTE NURSING/CASE MANAGEMENT/SOCIAL WORK - PATIENT PORTAL LINK FT
You can access the FollowMyHealth Patient Portal offered by [facility] by registering at the following website: [email or web address]. By joining [employer]’s FollowMyHealth portal, you will also be able to view your health information using other applications (apps) compatible with our system.

## 2022-02-25 NOTE — DISCHARGE NOTE PROVIDER - NSDCCPCAREPLAN_GEN_ALL_CORE_FT
PRINCIPAL DISCHARGE DIAGNOSIS  Diagnosis: Cellulitis of left foot  Assessment and Plan of Treatment:       SECONDARY DISCHARGE DIAGNOSES  Diagnosis: DM type 2 with diabetic foot ulcer  Assessment and Plan of Treatment:      PRINCIPAL DISCHARGE DIAGNOSIS  Diagnosis: Cellulitis of left foot  Assessment and Plan of Treatment:   -you have an infection of your skin with ulceration; otherwise known as a Diabetic foot ulcer  -you will need to continue antibiotics; you are prescribed Doxycycline 100 mg.; 1 tab 2x daily for a total of 10 days  -continue wound care per Dr. Carrillo; apply Gentamycin cream and cover with dry gauze daily  -follow up with Dr. Carrillo in his office next week  -can take Tylenol for pain/fevers      SECONDARY DISCHARGE DIAGNOSES  Diagnosis: DM type 2 with diabetic foot ulcer  Assessment and Plan of Treatment:

## 2022-02-25 NOTE — PROGRESS NOTE ADULT - ASSESSMENT
81y female with hx Type 2 Diabetes Mellitus, Hypertension, Hyperlipidemia, Anemia, Hypothyroidism who presents to the ER complaining of worsening left foot swelling and redness x 5days. Patient noticed L foot swelling and redness last week thursday. She states she was placed on oral bactrim by her podiatrist,  but then yesterday swelling and redness progressed and hence she came to ER. She denies fever, chills at home, here Tmax 100.5, receiving IV Vancomycin and feels improvement. She states allergies to PCN    PLAN    wound cult with S epi  podiatry and vascular input appreciated  Keep leg elevated  Oral doxy 100 bid for 10 days, no objections to dc from ID viewpoint

## 2022-02-25 NOTE — PROGRESS NOTE ADULT - SUBJECTIVE AND OBJECTIVE BOX
Patient seen for follow up of a left foot cellulitis and DM  ulcer. Patient denies pain, and is  tolerating IV ABx.  Afebrile and no pain.  "It feels better than yesterday." Reviewed chart and recent notes.   Eager to go home.      Vital Signs Last 24 Hrs  T(C): 36.4 (25 Feb 2022 05:35), Max: 36.7 (24 Feb 2022 16:25)  T(F): 97.6 (25 Feb 2022 05:35), Max: 98 (24 Feb 2022 16:25)  HR: 63 (25 Feb 2022 05:35) (63 - 68)  BP: 145/62 (25 Feb 2022 05:35) (125/84 - 152/60)  BP(mean): --  RR: 18 (25 Feb 2022 05:35) (18 - 19)  SpO2: 97% (25 Feb 2022 05:35) (97% - 100%)                        10.2   7.30  )-----------( 265      ( 24 Feb 2022 06:18 )             31.7       Culture - Surgical Swab (collected 23 Feb 2022 17:37)  Source: Plantar Left Foot  Preliminary Report (25 Feb 2022 12:54):    Rare Staphylococcus epidermidis    EXAM OF LEFT FOOT:     Almost complete resolution of edema, erythema and warmth around  the area around the wound. Cellulitis of reminder of foot and ankle resolved.  Ulcer 2mm x 2mm.  (-) purulent drainage expressed from wound today. (-) foul odor.  Wound no longer probes to SubQ.    (-) probe to bone. No pain today on palpation.

## 2022-02-25 NOTE — PROGRESS NOTE ADULT - REASON FOR ADMISSION
L foot redness and swelling

## 2022-02-25 NOTE — PROGRESS NOTE ADULT - SUBJECTIVE AND OBJECTIVE BOX
Patient is a 81y old  Female who presents with a chief complaint of L foot redness and swelling (24 Feb 2022 16:48)      Patient seen and examined at bedside. No overnight events reported.     ALLERGIES:  penicillin (Unknown)  shellfish (Unknown)    MEDICATIONS  (STANDING):  aspirin  chewable 81 milliGRAM(s) Oral daily  atorvastatin 20 milliGRAM(s) Oral at bedtime  dextrose 40% Gel 15 Gram(s) Oral once  dextrose 5%. 1000 milliLiter(s) (50 mL/Hr) IV Continuous <Continuous>  dextrose 5%. 1000 milliLiter(s) (100 mL/Hr) IV Continuous <Continuous>  dextrose 50% Injectable 25 Gram(s) IV Push once  dextrose 50% Injectable 12.5 Gram(s) IV Push once  dextrose 50% Injectable 25 Gram(s) IV Push once  enalapril 5 milliGRAM(s) Oral daily  gentamicin 0.1% Ointment 1 Application(s) Topical daily  glucagon  Injectable 1 milliGRAM(s) IntraMuscular once  heparin   Injectable 5000 Unit(s) SubCutaneous every 12 hours  influenza  Vaccine (HIGH DOSE) 0.7 milliLiter(s) IntraMuscular once  insulin glargine Injectable (LANTUS) 8 Unit(s) SubCutaneous at bedtime  insulin lispro (ADMELOG) corrective regimen sliding scale   SubCutaneous three times a day before meals  insulin lispro (ADMELOG) corrective regimen sliding scale   SubCutaneous at bedtime  levothyroxine 100 MICROGram(s) Oral daily  vancomycin  IVPB 750 milliGRAM(s) IV Intermittent every 24 hours    MEDICATIONS  (PRN):  acetaminophen     Tablet .. 650 milliGRAM(s) Oral every 6 hours PRN Temp greater or equal to 38C (100.4F), Mild Pain (1 - 3)  aluminum hydroxide/magnesium hydroxide/simethicone Suspension 30 milliLiter(s) Oral every 4 hours PRN Dyspepsia  melatonin 3 milliGRAM(s) Oral at bedtime PRN Insomnia  ondansetron Injectable 4 milliGRAM(s) IV Push every 8 hours PRN Nausea and/or Vomiting    Vital Signs Last 24 Hrs  T(F): 97.6 (25 Feb 2022 05:35), Max: 98 (24 Feb 2022 16:25)  HR: 63 (25 Feb 2022 05:35) (63 - 68)  BP: 145/62 (25 Feb 2022 05:35) (125/84 - 152/60)  RR: 18 (25 Feb 2022 05:35) (18 - 19)  SpO2: 97% (25 Feb 2022 05:35) (97% - 100%)  I&O's Summary    24 Feb 2022 07:01  -  25 Feb 2022 07:00  --------------------------------------------------------  IN: 900 mL / OUT: 0 mL / NET: 900 mL      PHYSICAL EXAM:  General: NAD, A/O x 3  ENT: No gross hearing impairment, Moist mucous membranes, no thrush  Neck: Supple, No JVD  Lungs: Clear to auscultation bilaterally, good air entry, non-labored breathing  Cardio: RRR, S1/S2, No murmur  Abdomen: Soft, Nontender, Nondistended; Bowel sounds present  Extremities: No calf tenderness, No cyanosis, No pitting edema  Psych: Appropriate mood and affect    LABS:                        10.2   7.30  )-----------( 265      ( 24 Feb 2022 06:18 )             31.7     02-24    139  |  103  |  26  ----------------------------<  131  4.6   |  29  |  1.24    Ca    9.1      24 Feb 2022 06:18    TPro  6.6  /  Alb  2.9  /  TBili  0.2  /  DBili  x   /  AST  15  /  ALT  12  /  AlkPhos  69  02-23        eGFR if Non African American: 41 mL/min/1.73M2 (02-24-22 @ 06:18)                            POCT Blood Glucose.: 97 mg/dL (25 Feb 2022 08:16)  POCT Blood Glucose.: 172 mg/dL (24 Feb 2022 21:09)  POCT Blood Glucose.: 173 mg/dL (24 Feb 2022 17:04)  POCT Blood Glucose.: 128 mg/dL (24 Feb 2022 12:39)          Culture - Blood (collected 22 Feb 2022 11:07)  Source: .Blood Blood-Peripheral  Preliminary Report (23 Feb 2022 17:02):    No growth to date.    Culture - Blood (collected 22 Feb 2022 11:07)  Source: .Blood Blood-Peripheral  Preliminary Report (23 Feb 2022 17:02):    No growth to date.      COVID-19 PCR: NotDetec (02-22-22 @ 11:30)    RADIOLOGY & ADDITIONAL TESTS:    Care Discussed with Consultants/Other Providers:    Patient is a 81y old  Female who presents with a chief complaint of L foot redness and swelling (24 Feb 2022 16:48)    Patient seen and examined at bedside. No overnight events reported.  Pain reports no pain this morning.  She is eager to go home.    ALLERGIES:  penicillin (Unknown)  shellfish (Unknown)    MEDICATIONS  (STANDING):  aspirin  chewable 81 milliGRAM(s) Oral daily  atorvastatin 20 milliGRAM(s) Oral at bedtime  dextrose 40% Gel 15 Gram(s) Oral once  dextrose 5%. 1000 milliLiter(s) (50 mL/Hr) IV Continuous <Continuous>  dextrose 5%. 1000 milliLiter(s) (100 mL/Hr) IV Continuous <Continuous>  dextrose 50% Injectable 25 Gram(s) IV Push once  dextrose 50% Injectable 12.5 Gram(s) IV Push once  dextrose 50% Injectable 25 Gram(s) IV Push once  enalapril 5 milliGRAM(s) Oral daily  gentamicin 0.1% Ointment 1 Application(s) Topical daily  glucagon  Injectable 1 milliGRAM(s) IntraMuscular once  heparin   Injectable 5000 Unit(s) SubCutaneous every 12 hours  influenza  Vaccine (HIGH DOSE) 0.7 milliLiter(s) IntraMuscular once  insulin glargine Injectable (LANTUS) 8 Unit(s) SubCutaneous at bedtime  insulin lispro (ADMELOG) corrective regimen sliding scale   SubCutaneous three times a day before meals  insulin lispro (ADMELOG) corrective regimen sliding scale   SubCutaneous at bedtime  levothyroxine 100 MICROGram(s) Oral daily  vancomycin  IVPB 750 milliGRAM(s) IV Intermittent every 24 hours    MEDICATIONS  (PRN):  acetaminophen     Tablet .. 650 milliGRAM(s) Oral every 6 hours PRN Temp greater or equal to 38C (100.4F), Mild Pain (1 - 3)  aluminum hydroxide/magnesium hydroxide/simethicone Suspension 30 milliLiter(s) Oral every 4 hours PRN Dyspepsia  melatonin 3 milliGRAM(s) Oral at bedtime PRN Insomnia  ondansetron Injectable 4 milliGRAM(s) IV Push every 8 hours PRN Nausea and/or Vomiting    Vital Signs Last 24 Hrs  T(F): 97.6 (25 Feb 2022 05:35), Max: 98 (24 Feb 2022 16:25)  HR: 63 (25 Feb 2022 05:35) (63 - 68)  BP: 145/62 (25 Feb 2022 05:35) (125/84 - 152/60)  RR: 18 (25 Feb 2022 05:35) (18 - 19)  SpO2: 97% (25 Feb 2022 05:35) (97% - 100%)  I&O's Summary    24 Feb 2022 07:01  -  25 Feb 2022 07:00  --------------------------------------------------------  IN: 900 mL / OUT: 0 mL / NET: 900 mL      PHYSICAL EXAM:  General: NAD, A/O x 3  ENT: No gross hearing impairment, Moist mucous membranes, no thrush  Neck: Supple, No JVD  Lungs: Clear to auscultation bilaterally, good air entry, non-labored breathing  Cardio: RRR, S1/S2, No murmur  Abdomen: Soft, Nontender, Nondistended; Bowel sounds present  Extremities: No calf tenderness, left foot wound is dressed, trace edema and erythema  Psych: Appropriate mood and affect    LABS:                        10.2   7.30  )-----------( 265      ( 24 Feb 2022 06:18 )             31.7     02-24    139  |  103  |  26  ----------------------------<  131  4.6   |  29  |  1.24    Ca    9.1      24 Feb 2022 06:18    TPro  6.6  /  Alb  2.9  /  TBili  0.2  /  DBili  x   /  AST  15  /  ALT  12  /  AlkPhos  69  02-23        eGFR if Non African American: 41 mL/min/1.73M2 (02-24-22 @ 06:18)                            POCT Blood Glucose.: 97 mg/dL (25 Feb 2022 08:16)  POCT Blood Glucose.: 172 mg/dL (24 Feb 2022 21:09)  POCT Blood Glucose.: 173 mg/dL (24 Feb 2022 17:04)  POCT Blood Glucose.: 128 mg/dL (24 Feb 2022 12:39)          Culture - Blood (collected 22 Feb 2022 11:07)  Source: .Blood Blood-Peripheral  Preliminary Report (23 Feb 2022 17:02):    No growth to date.    Culture - Blood (collected 22 Feb 2022 11:07)  Source: .Blood Blood-Peripheral  Preliminary Report (23 Feb 2022 17:02):    No growth to date.      COVID-19 PCR: NotDetec (02-22-22 @ 11:30)    RADIOLOGY & ADDITIONAL TESTS:  < from: Xray Tibia + Fibula 2 Views, Left (02.22.22 @ 11:21) >      INTERPRETATION:  Clinical information: Left leg cellulitis.    4 views of the left tibia and fibula.    No focal osteolysis or acute fracture is noted.    Soft tissue vascular calcification is present.      < end of copied text >    Care Discussed with Consultants/Other Providers:

## 2022-02-25 NOTE — PROGRESS NOTE ADULT - ASSESSMENT
1. Left DM foot infection with ulcer and abscess.  Cellulitis largely resolved except for neema-ulcerative area.    2. DM with Neuropathy and moderate PAD.    PLAN:  1. Deep Culture --->  Staph epi.   2. NSS irrigation.  3. Continue daily wound care with gentamycin cream  4. Patient started on doxycycline 100mg BID for 10 days.  5. Stable for D/C today.   6. Patient instructed in daily dressing changes. Will ambulate with surgical shoe.   7. F/U in office 5 days.

## 2022-02-25 NOTE — DISCHARGE NOTE PROVIDER - NSDCMRMEDTOKEN_GEN_ALL_CORE_FT
aspirin 81 mg oral tablet:   enalapril 5 mg oral tablet: 1 tab(s) orally once a day  Levemir 100 units/mL subcutaneous solution: 12 unit(s) subcutaneous once a day  rosuvastatin 5 mg oral tablet: 1 tab(s) orally once a day  Synthroid 100 mcg (0.1 mg) oral tablet: 1 tab(s) orally once a day   aspirin 81 mg oral tablet: 1 tab(s) orally once a day  doxycycline hyclate 100 mg oral tablet: 1 tab(s) orally 2 times a day   enalapril 5 mg oral tablet: 1 tab(s) orally once a day  gentamicin 0.1% topical ointment: 1 application topically once a day  Levemir 100 units/mL subcutaneous solution: 12 unit(s) subcutaneous once a day  rosuvastatin 5 mg oral tablet: 1 tab(s) orally once a day  Synthroid 100 mcg (0.1 mg) oral tablet: 1 tab(s) orally once a day   aspirin 81 mg oral tablet: 1 tab(s) orally once a day  doxycycline hyclate 100 mg oral tablet: 1 tab(s) orally 2 times a day   doxycycline monohydrate 100 mg oral capsule: 1 cap(s) orally every 12 hours  enalapril 5 mg oral tablet: 1 tab(s) orally once a day  gentamicin 0.1% topical ointment: 1 application topically once a day  Levemir 100 units/mL subcutaneous solution: 12 unit(s) subcutaneous once a day  rosuvastatin 5 mg oral tablet: 1 tab(s) orally once a day  Synthroid 100 mcg (0.1 mg) oral tablet: 1 tab(s) orally once a day

## 2022-02-25 NOTE — PROGRESS NOTE ADULT - ATTENDING COMMENTS
81y year old Female with PMH of Type 2 Diabetes Mellitus, Hypertension, Hyperlipidemia, Anemia, Hypothyroidism who presents to the ER complaining of worsening left foot swelling and redness x 5days.     Diabetic foot wound  Cont IV Vanco -day 2  FFup cultures  LUIS to be followed by Dr. Ro  Type 2 Diabetes Mellitus   DVT Prophylaxis:  Dispo - home when medically stable
82 y/o F with PMH of Type 2 Diabetes Mellitus, Hypertension, Hyperlipidemia, Anemia, Hypothyroidism who presents to the ER complaining of worsening left foot swelling and redness x 5days.     Diabetic foot wound  Cont IV Vanco  F/u cultures  LUIS to be followed by Dr. Ro  Type 2 Diabetes Mellitus   DVT ppx
80 y/o F with PMH of Type 2 Diabetes Mellitus, Hypertension, Hyperlipidemia, Anemia, Hypothyroidism who presents to the ER complaining of worsening left foot swelling and redness x 5days.     Diabetic foot wound  Cont IV Vanco  F/u cultures  LUIS to be followed by Dr. Ro  Type 2 Diabetes Mellitus   DVT ppx

## 2022-02-25 NOTE — DISCHARGE NOTE PROVIDER - CARE PROVIDER_API CALL
Devno Plasencia)  Family Medicine; Geriatric Medicine  [address] [place] [address]  Phone: [phone]  Fax: [phone]  Follow Up Time:

## 2022-02-25 NOTE — PROGRESS NOTE ADULT - ASSESSMENT
81y year old Female with PMH of Type 2 Diabetes Mellitus, Hypertension, Hyperlipidemia, Anemia, Hypothyroidism who presents to the ER complaining of worsening left foot swelling and redness x 5days.     # Diabetic foot wound  - failed outpatient bactrim  - Podiatry - Dr plaza following-wound care orders appreciated  - Cont IV Vanco -day 3  -Gentamycin cream   - BC NGTD. FU wound cultures   - Vascular following- Dr Ro. FU Vascular studies   - ID following    # Type 2 Diabetes Mellitus   - HbA1c 7,1  - FS and ANA MARIA  - Continue reduced home Lantus at 8u qhs    #PAD:  vascular following  FU LUIS     # Hypertension   # Hyperlipidemia   # Hypothyroidism  - c/w home meds - enalapril, Lipitor (therapeutic exchange), synthroid    # DVT Prophylaxis:  - Heparin    Code status: Full Code     Dispo: home when medically stable     **Patient prefers to inform family  81y year old Female with PMH of Type 2 Diabetes Mellitus, Hypertension, Hyperlipidemia, Anemia, Hypothyroidism who presents to the ER complaining of worsening left foot swelling and redness x 5days.     # Diabetic left foot wound with ulcer/abscess  - failed outpatient bactrim; now on IV Vanco day 4  - ID following; wound cultures still pending results, cont Vanco for now  - Podiatry following; Dr Carrillo for local wound care  - BC NGTD  - Vascular following- Dr Ro  -cont to monitor for fevers, wbc  -cont pain mgmt prn    # Type 2 Diabetes Mellitus   - HbA1c 7.1  - FS and ISS  - Continue Lantus at 8u qhs    #PAD  -Vascular following  -f/u LUIS results    # Hypertension   # Hyperlipidemia   # Hypothyroidism  - c/w home meds - enalapril, Lipitor (therapeutic exchange), synthroid    # DVT Prophylaxis:  Heparin    Code status: Full Code     Dispo: anticipate D/C home in next 24>48 hours, she prefers to update her family on plan of care.

## 2022-02-25 NOTE — DISCHARGE NOTE NURSING/CASE MANAGEMENT/SOCIAL WORK - NSDCPEFALRISK_GEN_ALL_CORE
For information on Fall & Injury Prevention, visit: [email or web address] OR  [email or web address] OR  https://www.cdc.gov/steadi/patient.html

## 2022-02-25 NOTE — DISCHARGE NOTE PROVIDER - NSDCFUSCHEDAPPT_GEN_ALL_CORE_FT
TAPAN GAYTAN ; 02/28/2022 ; NPP Med  [facility] TAPAN GAYTAN ; 04/01/2022 ; NPP Med  [facility]

## 2022-02-27 LAB
CULTURE RESULTS: SIGNIFICANT CHANGE UP
CULTURE RESULTS: SIGNIFICANT CHANGE UP
SPECIMEN SOURCE: SIGNIFICANT CHANGE UP
SPECIMEN SOURCE: SIGNIFICANT CHANGE UP

## 2022-02-28 ENCOUNTER — APPOINTMENT (OUTPATIENT)
Dept: FAMILY MEDICINE | Facility: CLINIC | Age: 82
End: 2022-02-28
Payer: MEDICARE

## 2022-02-28 ENCOUNTER — NON-APPOINTMENT (OUTPATIENT)
Age: 82
End: 2022-02-28

## 2022-02-28 VITALS
OXYGEN SATURATION: 97 % | HEIGHT: 65 IN | TEMPERATURE: 97.6 F | HEART RATE: 73 BPM | BODY MASS INDEX: 23.04 KG/M2 | WEIGHT: 138.31 LBS | RESPIRATION RATE: 16 BRPM | DIASTOLIC BLOOD PRESSURE: 68 MMHG | SYSTOLIC BLOOD PRESSURE: 110 MMHG

## 2022-02-28 PROCEDURE — 99214 OFFICE O/P EST MOD 30 MIN: CPT

## 2022-02-28 NOTE — ASSESSMENT
[FreeTextEntry1] : Patient doing quite well postoperatively has completed course of doxycycline will be following up with podiatry had vascular evaluation that was negative for her critical ischemia will be followed up here in 1 month diabetes control is good we have reviewed the hospital records

## 2022-02-28 NOTE — HISTORY OF PRESENT ILLNESS
[FreeTextEntry1] : Diabetic foot ulcer [de-identified] : Patient seen here in follow-up on admission at the [facility] on 1/14/2022 due to a diabetic foot ulcer with surrounding cellulitis at that time she was seen by both podiatry and vascular surgery along with the internal medicine placed on antibiotics with good resolution of the infection process since that time she is wearing a boot and feels well she is able to ambulate she is seeing podiatry on a weekly basis review of systems is unremarkable no fever no chills her diabetes remains in good control room air

## 2022-02-28 NOTE — PHYSICAL EXAM
[No Acute Distress] : no acute distress [Well Nourished] : well nourished [Well Developed] : well developed [Well-Appearing] : well-appearing [Normal Sclera/Conjunctiva] : normal sclera/conjunctiva [PERRL] : pupils equal round and reactive to light [Normal Outer Ear/Nose] : the outer ears and nose were normal in appearance [Normal TMs] : both tympanic membranes were normal [No JVD] : no jugular venous distention [Normal Rate] : normal rate  [No Murmur] : no murmur heard [No Edema] : there was no peripheral edema [Soft] : abdomen soft [Non Tender] : non-tender [Non-distended] : non-distended [No Masses] : no abdominal mass palpated [No HSM] : no HSM [Normal Bowel Sounds] : normal bowel sounds [Normal Supraclavicular Nodes] : no supraclavicular lymphadenopathy [Normal Posterior Cervical Nodes] : no posterior cervical lymphadenopathy [Normal Anterior Cervical Nodes] : no anterior cervical lymphadenopathy [No CVA Tenderness] : no CVA  tenderness [No Spinal Tenderness] : no spinal tenderness [de-identified] : Protecting left foot

## 2022-03-01 LAB
CULTURE RESULTS: SIGNIFICANT CHANGE UP
SPECIMEN SOURCE: SIGNIFICANT CHANGE UP

## 2022-03-02 LAB — SPECIMEN SOURCE: SIGNIFICANT CHANGE UP

## 2022-04-01 ENCOUNTER — APPOINTMENT (OUTPATIENT)
Dept: FAMILY MEDICINE | Facility: CLINIC | Age: 82
End: 2022-04-01
Payer: MEDICARE

## 2022-04-01 VITALS
HEART RATE: 70 BPM | BODY MASS INDEX: 22.99 KG/M2 | DIASTOLIC BLOOD PRESSURE: 80 MMHG | SYSTOLIC BLOOD PRESSURE: 100 MMHG | RESPIRATION RATE: 16 BRPM | HEIGHT: 65 IN | TEMPERATURE: 98.4 F | WEIGHT: 138 LBS | OXYGEN SATURATION: 97 %

## 2022-04-01 PROCEDURE — 99213 OFFICE O/P EST LOW 20 MIN: CPT

## 2022-04-01 NOTE — PHYSICAL EXAM
[No Acute Distress] : no acute distress [Well Nourished] : well nourished [Well Developed] : well developed [Well-Appearing] : well-appearing [Normal Sclera/Conjunctiva] : normal sclera/conjunctiva [PERRL] : pupils equal round and reactive to light [Normal Outer Ear/Nose] : the outer ears and nose were normal in appearance [Normal Oropharynx] : the oropharynx was normal [No JVD] : no jugular venous distention [No Lymphadenopathy] : no lymphadenopathy [Thyroid Normal, No Nodules] : the thyroid was normal and there were no nodules present [No Respiratory Distress] : no respiratory distress  [No Accessory Muscle Use] : no accessory muscle use [Normal Rate] : normal rate  [Regular Rhythm] : with a regular rhythm [No Murmur] : no murmur heard [No Edema] : there was no peripheral edema [Soft] : abdomen soft [Non Tender] : non-tender [No HSM] : no HSM [Normal Bowel Sounds] : normal bowel sounds [de-identified] : Examination of the left foot is almost normal there is a small eschar at the base of the great toe otherwise well-healed no sign of active infection

## 2022-04-01 NOTE — PLAN
[FreeTextEntry1] : Patient doing well have ordered laboratory work and also refill of her levothyroxine follow-up with us in 3 to 4 months or as needed patient advised to call after she has lab work done

## 2022-04-01 NOTE — HISTORY OF PRESENT ILLNESS
[FreeTextEntry1] : Follow-up on diabetic ulcer [de-identified] : Patient here in follow-up on the hospitalization for diabetic ulcer she is now completed her antibiotics she is following up with podiatry will be seeing them again in a week she is back to her normal activities feels well does report some low blood sugars in the morning in the 80s but these were not associated with hypoglycemia symptoms she is currently taking 12 units of Levemir in the evenings and sometimes an evening snack review of symptoms otherwise unremarkable

## 2022-04-04 ENCOUNTER — RX RENEWAL (OUTPATIENT)
Age: 82
End: 2022-04-04

## 2022-04-24 LAB
ALBUMIN SERPL ELPH-MCNC: 4 G/DL
ALP BLD-CCNC: 73 U/L
ALT SERPL-CCNC: 11 U/L
ANION GAP SERPL CALC-SCNC: 10 MMOL/L
AST SERPL-CCNC: 17 U/L
BASOPHILS # BLD AUTO: 0.04 K/UL
BASOPHILS NFR BLD AUTO: 0.6 %
BILIRUB SERPL-MCNC: 0.2 MG/DL
BUN SERPL-MCNC: 22 MG/DL
CALCIUM SERPL-MCNC: 9.2 MG/DL
CHLORIDE SERPL-SCNC: 107 MMOL/L
CHOLEST SERPL-MCNC: 131 MG/DL
CO2 SERPL-SCNC: 28 MMOL/L
CREAT SERPL-MCNC: 0.97 MG/DL
EGFR: 59 ML/MIN/1.73M2
EOSINOPHIL # BLD AUTO: 0.18 K/UL
EOSINOPHIL NFR BLD AUTO: 2.9 %
ESTIMATED AVERAGE GLUCOSE: 171 MG/DL
GLUCOSE SERPL-MCNC: 122 MG/DL
HBA1C MFR BLD HPLC: 7.6 %
HCT VFR BLD CALC: 36.9 %
HDLC SERPL-MCNC: 62 MG/DL
HGB BLD-MCNC: 11.4 G/DL
IMM GRANULOCYTES NFR BLD AUTO: 0.5 %
LDLC SERPL CALC-MCNC: 60 MG/DL
LYMPHOCYTES # BLD AUTO: 1.45 K/UL
LYMPHOCYTES NFR BLD AUTO: 23 %
MAN DIFF?: NORMAL
MCHC RBC-ENTMCNC: 29.4 PG
MCHC RBC-ENTMCNC: 30.9 GM/DL
MCV RBC AUTO: 95.1 FL
MONOCYTES # BLD AUTO: 0.61 K/UL
MONOCYTES NFR BLD AUTO: 9.7 %
NEUTROPHILS # BLD AUTO: 3.99 K/UL
NEUTROPHILS NFR BLD AUTO: 63.3 %
NONHDLC SERPL-MCNC: 69 MG/DL
PLATELET # BLD AUTO: 183 K/UL
POTASSIUM SERPL-SCNC: 4.8 MMOL/L
PROT SERPL-MCNC: 6.6 G/DL
RBC # BLD: 3.88 M/UL
RBC # FLD: 13.4 %
SODIUM SERPL-SCNC: 145 MMOL/L
TRIGL SERPL-MCNC: 45 MG/DL
TSH SERPL-ACNC: 3.95 UIU/ML
WBC # FLD AUTO: 6.3 K/UL

## 2022-04-25 ENCOUNTER — NON-APPOINTMENT (OUTPATIENT)
Age: 82
End: 2022-04-25

## 2022-05-27 NOTE — DIETITIAN INITIAL EVALUATION ADULT. - PATIENT MEETS CRITERIA FOR MALNUTRITION
For information on Fall & Injury Prevention, visit: [email or web address] OR  [email or web address] OR  https://www.cdc.gov/steadi/patient.html no

## 2022-08-11 ENCOUNTER — RX RENEWAL (OUTPATIENT)
Age: 82
End: 2022-08-11

## 2022-08-19 ENCOUNTER — RX RENEWAL (OUTPATIENT)
Age: 82
End: 2022-08-19

## 2022-09-21 ENCOUNTER — RX RENEWAL (OUTPATIENT)
Age: 82
End: 2022-09-21

## 2022-09-29 ENCOUNTER — RX RENEWAL (OUTPATIENT)
Age: 82
End: 2022-09-29

## 2022-11-10 ENCOUNTER — RX RENEWAL (OUTPATIENT)
Age: 82
End: 2022-11-10

## 2023-02-02 ENCOUNTER — RX RENEWAL (OUTPATIENT)
Age: 83
End: 2023-02-02

## 2023-02-02 RX ORDER — INSULIN DETEMIR 100 [IU]/ML
100 INJECTION, SOLUTION SUBCUTANEOUS
Qty: 10 | Refills: 3 | Status: ACTIVE | COMMUNITY
Start: 2017-06-12 | End: 1900-01-01

## 2023-03-20 RX ORDER — SYRINGE-NEEDLE,INSULIN,0.5 ML 31 GX5/16"
31G X 5/16" SYRINGE, EMPTY DISPOSABLE MISCELLANEOUS
Qty: 30 | Refills: 0 | Status: ACTIVE | COMMUNITY
Start: 2023-03-20 | End: 1900-01-01

## 2023-03-29 ENCOUNTER — RX RENEWAL (OUTPATIENT)
Age: 83
End: 2023-03-29

## 2023-04-10 ENCOUNTER — APPOINTMENT (OUTPATIENT)
Dept: FAMILY MEDICINE | Facility: CLINIC | Age: 83
End: 2023-04-10
Payer: MEDICARE

## 2023-04-10 VITALS
HEIGHT: 65 IN | HEART RATE: 74 BPM | TEMPERATURE: 99.8 F | OXYGEN SATURATION: 96 % | DIASTOLIC BLOOD PRESSURE: 62 MMHG | SYSTOLIC BLOOD PRESSURE: 130 MMHG | WEIGHT: 142 LBS | RESPIRATION RATE: 16 BRPM | BODY MASS INDEX: 23.66 KG/M2

## 2023-04-10 PROCEDURE — G0009: CPT

## 2023-04-10 PROCEDURE — G0439: CPT

## 2023-04-10 PROCEDURE — 90677 PCV20 VACCINE IM: CPT

## 2023-04-10 RX ORDER — ASPIRIN ENTERIC COATED TABLETS 81 MG 81 MG/1
81 TABLET, DELAYED RELEASE ORAL DAILY
Qty: 30 | Refills: 3 | Status: COMPLETED | COMMUNITY
Start: 2017-06-12 | End: 2023-04-10

## 2023-04-10 RX ORDER — BENZONATATE 100 MG/1
100 CAPSULE ORAL
Qty: 30 | Refills: 0 | Status: COMPLETED | COMMUNITY
Start: 2021-05-28 | End: 2023-04-10

## 2023-04-10 RX ORDER — GUAIFENESIN 600 MG/1
600 TABLET, EXTENDED RELEASE ORAL
Qty: 14 | Refills: 0 | Status: COMPLETED | COMMUNITY
Start: 2021-05-28 | End: 2023-04-10

## 2023-04-10 NOTE — PLAN
[FreeTextEntry1] : Patient is doing reasonably well she will receive a PCV 20 as she has only had 1 pneumococcal vaccine she will have laboratory work for general evaluation of her diabetes and also fatigue should be sent to ophthalmology for a eye exam and to for a DEXA scan also a Cologuard will be ordered as she does not wish to have a colonoscopy at this time overall she is doing quite well mentally very alert ambulatory independently and has excellent mental faculties

## 2023-04-10 NOTE — PHYSICAL EXAM
[No Acute Distress] : no acute distress [Well Nourished] : well nourished [Well Developed] : well developed [Well-Appearing] : well-appearing [Normal Sclera/Conjunctiva] : normal sclera/conjunctiva [PERRL] : pupils equal round and reactive to light [No JVD] : no jugular venous distention [No Respiratory Distress] : no respiratory distress  [No Lymphadenopathy] : no lymphadenopathy [No Accessory Muscle Use] : no accessory muscle use [Normal Rate] : normal rate  [Regular Rhythm] : with a regular rhythm [No Murmur] : no murmur heard [No Edema] : there was no peripheral edema [Soft] : abdomen soft [Non Tender] : non-tender [No HSM] : no HSM [Normal Supraclavicular Nodes] : no supraclavicular lymphadenopathy [Normal Posterior Cervical Nodes] : no posterior cervical lymphadenopathy [Normal Anterior Cervical Nodes] : no anterior cervical lymphadenopathy [No CVA Tenderness] : no CVA  tenderness [No Spinal Tenderness] : no spinal tenderness [Coordination Grossly Intact] : coordination grossly intact [No Focal Deficits] : no focal deficits [Normal Gait] : normal gait [Speech Grossly Normal] : speech grossly normal [Memory Grossly Normal] : memory grossly normal [Alert and Oriented x3] : oriented to person, place, and time [de-identified] : Right-sided radical neck bilateral wax plugs [de-identified] : Right-sided radical neck

## 2023-04-10 NOTE — REVIEW OF SYSTEMS
[Fever] : no fever [Chills] : no chills [Fatigue] : fatigue [Recent Change In Weight] : ~T no recent weight change [Vision Problems] : no vision problems [Nasal Discharge] : no nasal discharge [Sore Throat] : no sore throat [Chest Pain] : no chest pain [Palpitations] : no palpitations [Shortness Of Breath] : no shortness of breath [Wheezing] : no wheezing [Cough] : no cough [Dyspnea on Exertion] : no dyspnea on exertion [Abdominal Pain] : no abdominal pain [Nausea] : no nausea [Constipation] : no constipation [Diarrhea] : diarrhea [Vomiting] : no vomiting [Heartburn] : no heartburn [Melena] : no melena [Dysuria] : no dysuria [Incontinence] : no incontinence [Nocturia] : no nocturia [Frequency] : no frequency [Joint Pain] : no joint pain [Joint Stiffness] : no joint stiffness [Joint Swelling] : no joint swelling [Muscle Weakness] : no muscle weakness [Muscle Pain] : no muscle pain [Back Pain] : no back pain [Mole Changes] : no mole changes [Hair Changes] : no hair changes [Headache] : no headache [Dizziness] : no dizziness [Confusion] : no confusion [Insomnia] : no insomnia [Anxiety] : no anxiety [Depression] : no depression [Easy Bleeding] : no easy bleeding

## 2023-04-10 NOTE — HISTORY OF PRESENT ILLNESS
[FreeTextEntry1] : Diabetes difficulty swallowing [de-identified] : Patient seen here for an annual wellness visit her only complaints really are some difficulty swallowing she had a radical neck procedure years ago and has been under the care of Dr. Mallory.  She does feel some general fatigue she is going to be 83 and still works 3 days a week and helps at home with her grandson and family members review of systems is otherwise fairly unremarkable she has not had an eye exam in some time nor is she had a DEXA scan not had laboratory in about a year she remains on her same dose of insulin was hospitalized about a year ago due to a foot infection treated in the hospital with antibiotics EKG at that time was stable for the past year with a left bundle branch block

## 2023-04-17 LAB
ALBUMIN SERPL ELPH-MCNC: 4.1 G/DL
ALP BLD-CCNC: 59 U/L
ALT SERPL-CCNC: 10 U/L
ANION GAP SERPL CALC-SCNC: 12 MMOL/L
APPEARANCE: CLEAR
AST SERPL-CCNC: 17 U/L
BASOPHILS # BLD AUTO: 0.04 K/UL
BASOPHILS NFR BLD AUTO: 0.4 %
BILIRUB SERPL-MCNC: 0.4 MG/DL
BILIRUBIN URINE: NEGATIVE
BLOOD URINE: ABNORMAL
BUN SERPL-MCNC: 25 MG/DL
CALCIUM SERPL-MCNC: 9.2 MG/DL
CHLORIDE SERPL-SCNC: 103 MMOL/L
CHOLEST SERPL-MCNC: 131 MG/DL
CO2 SERPL-SCNC: 26 MMOL/L
COLOR: NORMAL
CREAT SERPL-MCNC: 0.96 MG/DL
EGFR: 59 ML/MIN/1.73M2
EOSINOPHIL # BLD AUTO: 0.1 K/UL
EOSINOPHIL NFR BLD AUTO: 0.9 %
ESTIMATED AVERAGE GLUCOSE: 197 MG/DL
FERRITIN SERPL-MCNC: 26 NG/ML
FOLATE SERPL-MCNC: 5.9 NG/ML
GLUCOSE QUALITATIVE U: ABNORMAL
GLUCOSE SERPL-MCNC: 153 MG/DL
HBA1C MFR BLD HPLC: 8.5 %
HCT VFR BLD CALC: 37 %
HDLC SERPL-MCNC: 66 MG/DL
HGB BLD-MCNC: 11.8 G/DL
IMM GRANULOCYTES NFR BLD AUTO: 0.4 %
KETONES URINE: NEGATIVE
LDLC SERPL CALC-MCNC: 55 MG/DL
LEUKOCYTE ESTERASE URINE: ABNORMAL
LYMPHOCYTES # BLD AUTO: 1.27 K/UL
LYMPHOCYTES NFR BLD AUTO: 11.9 %
MAN DIFF?: NORMAL
MCHC RBC-ENTMCNC: 29.6 PG
MCHC RBC-ENTMCNC: 31.9 GM/DL
MCV RBC AUTO: 93 FL
MONOCYTES # BLD AUTO: 0.89 K/UL
MONOCYTES NFR BLD AUTO: 8.4 %
NEUTROPHILS # BLD AUTO: 8.3 K/UL
NEUTROPHILS NFR BLD AUTO: 78 %
NITRITE URINE: NEGATIVE
NONHDLC SERPL-MCNC: 65 MG/DL
PH URINE: 6
PLATELET # BLD AUTO: 189 K/UL
POTASSIUM SERPL-SCNC: 4.6 MMOL/L
PROT SERPL-MCNC: 6.3 G/DL
PROTEIN URINE: NORMAL
RBC # BLD: 3.98 M/UL
RBC # FLD: 12.5 %
SODIUM SERPL-SCNC: 141 MMOL/L
SPECIFIC GRAVITY URINE: 1.02
TRIGL SERPL-MCNC: 49 MG/DL
TSH SERPL-ACNC: 2.11 UIU/ML
UROBILINOGEN URINE: NORMAL
VIT B12 SERPL-MCNC: 363 PG/ML
WBC # FLD AUTO: 10.64 K/UL

## 2023-05-18 ENCOUNTER — RX RENEWAL (OUTPATIENT)
Age: 83
End: 2023-05-18

## 2023-05-18 RX ORDER — ENALAPRIL MALEATE 5 MG/1
5 TABLET ORAL
Qty: 90 | Refills: 2 | Status: ACTIVE | COMMUNITY
Start: 2017-06-12 | End: 1900-01-01

## 2023-06-16 ENCOUNTER — APPOINTMENT (OUTPATIENT)
Dept: FAMILY MEDICINE | Facility: CLINIC | Age: 83
End: 2023-06-16

## 2023-06-18 ENCOUNTER — INPATIENT (INPATIENT)
Facility: HOSPITAL | Age: 83
LOS: 0 days | Discharge: HOME | DRG: 195 | End: 2023-06-19
Attending: HOSPITALIST | Admitting: HOSPITALIST
Payer: COMMERCIAL

## 2023-06-18 VITALS
OXYGEN SATURATION: 92 % | DIASTOLIC BLOOD PRESSURE: 65 MMHG | RESPIRATION RATE: 16 BRPM | SYSTOLIC BLOOD PRESSURE: 134 MMHG | TEMPERATURE: 99 F | WEIGHT: 145.06 LBS | HEART RATE: 82 BPM | HEIGHT: 65 IN

## 2023-06-18 DIAGNOSIS — J18.9 PNEUMONIA, UNSPECIFIED ORGANISM: ICD-10-CM

## 2023-06-18 DIAGNOSIS — Z41.9 ENCOUNTER FOR PROCEDURE FOR PURPOSES OTHER THAN REMEDYING HEALTH STATE, UNSPECIFIED: Chronic | ICD-10-CM

## 2023-06-18 DIAGNOSIS — Z90.49 ACQUIRED ABSENCE OF OTHER SPECIFIED PARTS OF DIGESTIVE TRACT: Chronic | ICD-10-CM

## 2023-06-18 LAB
A1C WITH ESTIMATED AVERAGE GLUCOSE RESULT: 8.5 % — HIGH (ref 4–5.6)
ALBUMIN SERPL ELPH-MCNC: 3.2 G/DL — LOW (ref 3.3–5)
ALP SERPL-CCNC: 74 U/L — SIGNIFICANT CHANGE UP (ref 40–120)
ALT FLD-CCNC: 58 U/L — HIGH (ref 10–45)
ANION GAP SERPL CALC-SCNC: 6 MMOL/L — SIGNIFICANT CHANGE UP (ref 5–17)
AST SERPL-CCNC: 75 U/L — HIGH (ref 10–40)
BASOPHILS # BLD AUTO: 0.05 K/UL — SIGNIFICANT CHANGE UP (ref 0–0.2)
BASOPHILS NFR BLD AUTO: 0.3 % — SIGNIFICANT CHANGE UP (ref 0–2)
BILIRUB SERPL-MCNC: 0.5 MG/DL — SIGNIFICANT CHANGE UP (ref 0.2–1.2)
BUN SERPL-MCNC: 31 MG/DL — HIGH (ref 7–23)
CALCIUM SERPL-MCNC: 8.6 MG/DL — SIGNIFICANT CHANGE UP (ref 8.4–10.5)
CHLORIDE SERPL-SCNC: 102 MMOL/L — SIGNIFICANT CHANGE UP (ref 96–108)
CO2 SERPL-SCNC: 30 MMOL/L — SIGNIFICANT CHANGE UP (ref 22–31)
CREAT SERPL-MCNC: 1.12 MG/DL — SIGNIFICANT CHANGE UP (ref 0.5–1.3)
EGFR: 49 ML/MIN/1.73M2 — LOW
EOSINOPHIL # BLD AUTO: 0 K/UL — SIGNIFICANT CHANGE UP (ref 0–0.5)
EOSINOPHIL NFR BLD AUTO: 0 % — SIGNIFICANT CHANGE UP (ref 0–6)
ESTIMATED AVERAGE GLUCOSE: 197 MG/DL — HIGH (ref 68–114)
GLUCOSE BLDC GLUCOMTR-MCNC: 172 MG/DL — HIGH (ref 70–99)
GLUCOSE BLDC GLUCOMTR-MCNC: 190 MG/DL — HIGH (ref 70–99)
GLUCOSE SERPL-MCNC: 376 MG/DL — HIGH (ref 70–99)
HCT VFR BLD CALC: 32.9 % — LOW (ref 34.5–45)
HGB BLD-MCNC: 10.9 G/DL — LOW (ref 11.5–15.5)
IMM GRANULOCYTES NFR BLD AUTO: 0.5 % — SIGNIFICANT CHANGE UP (ref 0–0.9)
LYMPHOCYTES # BLD AUTO: 0.56 K/UL — LOW (ref 1–3.3)
LYMPHOCYTES # BLD AUTO: 3.1 % — LOW (ref 13–44)
MAGNESIUM SERPL-MCNC: 1.8 MG/DL — SIGNIFICANT CHANGE UP (ref 1.6–2.6)
MCHC RBC-ENTMCNC: 30.1 PG — SIGNIFICANT CHANGE UP (ref 27–34)
MCHC RBC-ENTMCNC: 33.1 GM/DL — SIGNIFICANT CHANGE UP (ref 32–36)
MCV RBC AUTO: 90.9 FL — SIGNIFICANT CHANGE UP (ref 80–100)
MONOCYTES # BLD AUTO: 1.2 K/UL — HIGH (ref 0–0.9)
MONOCYTES NFR BLD AUTO: 6.5 % — SIGNIFICANT CHANGE UP (ref 2–14)
NEUTROPHILS # BLD AUTO: 16.44 K/UL — HIGH (ref 1.8–7.4)
NEUTROPHILS NFR BLD AUTO: 89.6 % — HIGH (ref 43–77)
NRBC # BLD: 0 /100 WBCS — SIGNIFICANT CHANGE UP (ref 0–0)
PLATELET # BLD AUTO: 172 K/UL — SIGNIFICANT CHANGE UP (ref 150–400)
POTASSIUM SERPL-MCNC: 4.5 MMOL/L — SIGNIFICANT CHANGE UP (ref 3.5–5.3)
POTASSIUM SERPL-SCNC: 4.5 MMOL/L — SIGNIFICANT CHANGE UP (ref 3.5–5.3)
PROT SERPL-MCNC: 6.6 G/DL — SIGNIFICANT CHANGE UP (ref 6–8.3)
RBC # BLD: 3.62 M/UL — LOW (ref 3.8–5.2)
RBC # FLD: 12.2 % — SIGNIFICANT CHANGE UP (ref 10.3–14.5)
SODIUM SERPL-SCNC: 138 MMOL/L — SIGNIFICANT CHANGE UP (ref 135–145)
TROPONIN I, HIGH SENSITIVITY RESULT: 13.3 NG/L — SIGNIFICANT CHANGE UP
WBC # BLD: 18.35 K/UL — HIGH (ref 3.8–10.5)
WBC # FLD AUTO: 18.35 K/UL — HIGH (ref 3.8–10.5)

## 2023-06-18 PROCEDURE — 71046 X-RAY EXAM CHEST 2 VIEWS: CPT | Mod: 26

## 2023-06-18 PROCEDURE — 93010 ELECTROCARDIOGRAM REPORT: CPT

## 2023-06-18 PROCEDURE — 99223 1ST HOSP IP/OBS HIGH 75: CPT

## 2023-06-18 PROCEDURE — 99285 EMERGENCY DEPT VISIT HI MDM: CPT

## 2023-06-18 RX ORDER — LANOLIN ALCOHOL/MO/W.PET/CERES
3 CREAM (GRAM) TOPICAL AT BEDTIME
Refills: 0 | Status: DISCONTINUED | OUTPATIENT
Start: 2023-06-18 | End: 2023-06-19

## 2023-06-18 RX ORDER — DEXTROSE 50 % IN WATER 50 %
15 SYRINGE (ML) INTRAVENOUS ONCE
Refills: 0 | Status: DISCONTINUED | OUTPATIENT
Start: 2023-06-18 | End: 2023-06-19

## 2023-06-18 RX ORDER — DEXTROSE 50 % IN WATER 50 %
25 SYRINGE (ML) INTRAVENOUS ONCE
Refills: 0 | Status: DISCONTINUED | OUTPATIENT
Start: 2023-06-18 | End: 2023-06-19

## 2023-06-18 RX ORDER — ACETAMINOPHEN 500 MG
650 TABLET ORAL EVERY 6 HOURS
Refills: 0 | Status: DISCONTINUED | OUTPATIENT
Start: 2023-06-18 | End: 2023-06-19

## 2023-06-18 RX ORDER — ROSUVASTATIN CALCIUM 5 MG/1
1 TABLET ORAL
Qty: 0 | Refills: 0 | DISCHARGE

## 2023-06-18 RX ORDER — LEVOTHYROXINE SODIUM 125 MCG
100 TABLET ORAL DAILY
Refills: 0 | Status: DISCONTINUED | OUTPATIENT
Start: 2023-06-19 | End: 2023-06-19

## 2023-06-18 RX ORDER — ATORVASTATIN CALCIUM 80 MG/1
20 TABLET, FILM COATED ORAL AT BEDTIME
Refills: 0 | Status: DISCONTINUED | OUTPATIENT
Start: 2023-06-18 | End: 2023-06-19

## 2023-06-18 RX ORDER — GLUCAGON INJECTION, SOLUTION 0.5 MG/.1ML
1 INJECTION, SOLUTION SUBCUTANEOUS ONCE
Refills: 0 | Status: DISCONTINUED | OUTPATIENT
Start: 2023-06-18 | End: 2023-06-19

## 2023-06-18 RX ORDER — INSULIN GLARGINE 100 [IU]/ML
10 INJECTION, SOLUTION SUBCUTANEOUS AT BEDTIME
Refills: 0 | Status: DISCONTINUED | OUTPATIENT
Start: 2023-06-18 | End: 2023-06-19

## 2023-06-18 RX ORDER — LEVOTHYROXINE SODIUM 125 MCG
1 TABLET ORAL
Qty: 0 | Refills: 0 | DISCHARGE

## 2023-06-18 RX ORDER — SODIUM CHLORIDE 9 MG/ML
1000 INJECTION, SOLUTION INTRAVENOUS
Refills: 0 | Status: DISCONTINUED | OUTPATIENT
Start: 2023-06-18 | End: 2023-06-19

## 2023-06-18 RX ORDER — ASPIRIN/CALCIUM CARB/MAGNESIUM 324 MG
1 TABLET ORAL
Qty: 0 | Refills: 0 | DISCHARGE

## 2023-06-18 RX ORDER — INSULIN LISPRO 100/ML
VIAL (ML) SUBCUTANEOUS AT BEDTIME
Refills: 0 | Status: DISCONTINUED | OUTPATIENT
Start: 2023-06-18 | End: 2023-06-19

## 2023-06-18 RX ORDER — DEXTROSE 50 % IN WATER 50 %
12.5 SYRINGE (ML) INTRAVENOUS ONCE
Refills: 0 | Status: DISCONTINUED | OUTPATIENT
Start: 2023-06-18 | End: 2023-06-19

## 2023-06-18 RX ORDER — INSULIN LISPRO 100/ML
VIAL (ML) SUBCUTANEOUS
Refills: 0 | Status: DISCONTINUED | OUTPATIENT
Start: 2023-06-18 | End: 2023-06-19

## 2023-06-18 RX ORDER — LIDOCAINE 4 G/100G
1 CREAM TOPICAL DAILY
Refills: 0 | Status: DISCONTINUED | OUTPATIENT
Start: 2023-06-18 | End: 2023-06-19

## 2023-06-18 RX ADMIN — ATORVASTATIN CALCIUM 20 MILLIGRAM(S): 80 TABLET, FILM COATED ORAL at 21:46

## 2023-06-18 RX ADMIN — Medication 2: at 17:39

## 2023-06-18 RX ADMIN — LIDOCAINE 1 PATCH: 4 CREAM TOPICAL at 19:41

## 2023-06-18 RX ADMIN — LIDOCAINE 1 PATCH: 4 CREAM TOPICAL at 17:38

## 2023-06-18 RX ADMIN — INSULIN GLARGINE 10 UNIT(S): 100 INJECTION, SOLUTION SUBCUTANEOUS at 21:46

## 2023-06-18 NOTE — H&P ADULT - ASSESSMENT
82 yo woman with history of Diabetes Mellitus I with CKD III, Hypothyroidism, Persistent Cough, Hypertension, History of Radiation to the Neck and HLD comes to ED for right shoulder pain.    #Right Shoulder Pain possible due to Fall and Right base Pneumonia  WBC 18, + cough  Continue with levaquin due to PCN allergy. (Allergy unknown by patient)  Tylenol 650mg po q6hrs PRN for fever.  Lidocaine patch to R shoulder    #Diabetes Mellitus I with CKD III  [id number] 6/18  Glucose in 300s on chem, awaiting FS  Continue Lantus at decrease from 11 units (home med) to 10 units sc QHS  Hypoglycemia protocol and insulin sliding scale at pre-meal and at night  Blood glucose goal 100-180  Monitor BUN/Cr and electrolytes  Continue enalapril for renoprotective effect   Avoid nephrotoxic agents    #HLD  Continue rosuvastatin    #Hypothyroidism  Continue Synthroid    #Normocytic Anemia  Stable  Hg 10-11 range    #Mild transaminitis  Trend  Continue statin    DVT Prophylaxis encourage to ambulate    Patient's daughter went home after ED physician informed she should be admitted. Pt states no update needed.    Likely D/C in 24hrs

## 2023-06-18 NOTE — PATIENT PROFILE ADULT - FALL HARM RISK - HARM RISK INTERVENTIONS
Assistance with ambulation/Assistance OOB with selected safe patient handling equipment/Communicate Risk of Fall with Harm to all staff/Discuss with provider need for PT consult/Monitor gait and stability/Reinforce activity limits and safety measures with patient and family/Tailored Fall Risk Interventions/Visual Cue: Yellow wristband and red socks/Bed in lowest position, wheels locked, appropriate side rails in place/Call bell, personal items and telephone in reach/Instruct patient to call for assistance before getting out of bed or chair/Non-slip footwear when patient is out of bed/[place] to call system/Physically safe environment - no spills, clutter or unnecessary equipment/Purposeful Proactive Rounding/Room/bathroom lighting operational, light cord in reach

## 2023-06-18 NOTE — H&P ADULT - NSHPREVIEWOFSYSTEMS_GEN_ALL_CORE
CONSTITUTIONAL: No fever, weight loss, or fatigue  EYES: No eye pain, visual disturbances, or discharge  ENMT:  No difficulty hearing, tinnitus, vertigo; No sinus or throat pain  NECK: No pain or stiffness  RESPIRATORY: chronic cough but NO wheezing, chills or hemoptysis; No shortness of breath  CARDIOVASCULAR: No chest pain, palpitations, dizziness, or leg swelling  GASTROINTESTINAL: No abdominal or epigastric pain. No nausea, vomiting, or hematemesis; No diarrhea or constipation. No melena or hematochezia.  GENITOURINARY: No dysuria, frequency, hematuria, or incontinence  NEUROLOGICAL: No headaches, memory loss, loss of strength, numbness, or tremors  SKIN: No itching, burning, rashes, or lesions   MUSCULOSKELETAL: right shoulder pain  PSYCHIATRIC: No depression, anxiety, mood swings, or difficulty sleeping  ALLERGY AND IMMUNOLOGIC: No hives or eczema    ALL ROS REVIEWED AND NORMAL EXCEPT AS STATED ABOVE

## 2023-06-18 NOTE — PATIENT PROFILE ADULT - MST SCORE
Ibuprofen 400 mg every 6 hours as needed for fever. Encourage frequent fluids. Return for worsening cough, high fevers, respiratory distress or any concerning symptoms. 0

## 2023-06-18 NOTE — PATIENT PROFILE ADULT - FUNCTIONAL ASSESSMENT - BASIC MOBILITY 6.
3-calculated by average/Not able to assess (calculate score using [employer] averaging method)

## 2023-06-18 NOTE — H&P ADULT - NSHPLABSRESULTS_GEN_ALL_CORE
LABS:                        10.9   18.35 )-----------( 172      ( 18 Jun 2023 12:10 )             32.9     06-18    138  |  102  |  31<H>  ----------------------------<  376<H>  4.5   |  30  |  1.12    Ca    8.6      18 Jun 2023 12:10  Mg     1.8     06-18    TPro  6.6  /  Alb  3.2<L>  /  TBili  0.5  /  DBili  x   /  AST  75<H>  /  ALT  58<H>  /  AlkPhos  74  06-18    RADIOLOGY & ADDITIONAL TESTS:  Xray Chest 2 Views PA/Lat (06.18.23 @ 12:59) >Interstitial infiltrate/atelectasis at the right base with no effusion or lobar consolidation  EKG (pending)    Care Discussed with Consultants/Other Providers [ x] YES  [ ] NO  Imaging Personally Reviewed:  [x ] YES  [ ] NO

## 2023-06-18 NOTE — PATIENT PROFILE ADULT - OVER THE PAST TWO WEEKS HAVE YOU FELT DOWN, DEPRESSED OR HOPELESS?
Pt arrives to the ED with abdominal pain and SOB. Pt has liver failure and have multiple paracentesis. Pt states the last time he was here about five days ago they removed 4L of fluids from him. The discomfort is also making him SOB.    
no

## 2023-06-18 NOTE — H&P ADULT - NSICDXFAMILYHX_GEN_ALL_CORE_FT
FAMILY HISTORY:  Mother  Still living? Unknown  Family history of parkinsonism, Age at diagnosis: Age Unknown

## 2023-06-18 NOTE — ED ADULT NURSE NOTE - OBJECTIVE STATEMENT
Pt presents to ED with c/o cough.  Reports recent mechanical fall, where she braced her fall with outstretched palms and on to her knees.  Healing abrasion noted to have right knee.  Pt denies any head strike or LOC at that time.  Reports intermittent productive cough.  SpO2 WNL in ED.

## 2023-06-18 NOTE — ED PROVIDER NOTE - CARE PLAN
Assessment and plan of treatment:	This is an 83-year-old female presenting to the ED for evaluation of injury sustained in a fall, as well as a cough.  The fall appears mechanical, and there do not appear to be any major injury sustained.  I will defer x-ray imaging in accordance with [place] guidelines. Differential for acute cough includes URI, bronchitis, pneumonia, influenza, allergies, asthma. Radiation of pain to the shoulder may suggest diaphragmatic irritation secondary to pneumonia, also consider inferior MI.  We will send broad labs to assess for evidence of infection, anemia, electrolyte derangements, cardiac ischemia.  Pending EKG and chest x-ray.   Principal Discharge DX:	Pneumonia  Assessment and plan of treatment:	This is an 83-year-old female presenting to the ED for evaluation of injury sustained in a fall, as well as a cough.  The fall appears mechanical, and there do not appear to be any major injury sustained.  I will defer x-ray imaging in accordance with [place] guidelines. Differential for acute cough includes URI, bronchitis, pneumonia, influenza, allergies, asthma. Radiation of pain to the shoulder may suggest diaphragmatic irritation secondary to pneumonia, also consider inferior MI.  We will send broad labs to assess for evidence of infection, anemia, electrolyte derangements, cardiac ischemia.  Pending EKG and chest x-ray.   1

## 2023-06-18 NOTE — ED PROVIDER NOTE - PHYSICAL EXAMINATION
Gen: Awake and Alert, in NAD  HEENT: Normocephalic, Atraumatic. PERRL, EOMI.  Neck: Supple, FROM. Postsurgical changes present on the right lateral neck and trapezius.  CV: RRR, +S1/S2, No M/R/G  Pulm: Normal WOB. RR WNL. No wheeze, rhonchi, rales. No accessory muscle use.  Abd: S NT ND.  MSK: Moving all 4. Small well-healing abrasion on the right kneecap.  Patella are freely mobile bilaterally.  No tenderness to the fibular heads bilaterally.  No pain to palpation of the hands and wrists.  No visible trauma.  Finger opposition is intact, patient able to make a strong fist bilaterally.  No snuffbox tenderness.  Skin: Normal color, temp, turgor. No rashes appreciated.  Neuro: AOx3, No focal deficits appreciated

## 2023-06-18 NOTE — H&P ADULT - HISTORY OF PRESENT ILLNESS
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 Patient had some pain in the knees but that is better but her shoulder pain is worse. She reports chills and chronic cough. She denies fever, chest pain, abdominal pain.    In the ED, pt was afebrile, HR 85, /65, RR 16, O2 sat 92% on ra. CXR shows Right base infiltrate and WBC 18. Patient admitted for CAP

## 2023-06-18 NOTE — H&P ADULT - NSHPPHYSICALEXAM_GEN_ALL_CORE
T(C): 37.2 (06-18-23 @ 10:42), Max: 37.2 (06-18-23 @ 10:42)  HR: 64 (06-18-23 @ 14:24) (64 - 82)  BP: 119/50 (06-18-23 @ 14:24) (119/50 - 145/50)  RR: 17 (06-18-23 @ 14:24) (16 - 17)  SpO2: 97% (06-18-23 @ 14:24) (92% - 97%)  Wt(kg): --Vital Signs Last 24 Hrs  T(C): 37.2 (18 Jun 2023 10:42), Max: 37.2 (18 Jun 2023 10:42)  T(F): 99 (18 Jun 2023 10:42), Max: 99 (18 Jun 2023 10:42)  HR: 64 (18 Jun 2023 14:24) (64 - 82)  BP: 119/50 (18 Jun 2023 14:24) (119/50 - 145/50)  BP(mean): 70 (18 Jun 2023 14:24) (70 - 79)  RR: 17 (18 Jun 2023 14:24) (16 - 17)  SpO2: 97% (18 Jun 2023 14:24) (92% - 97%)    Parameters below as of 18 Jun 2023 14:24  Patient On (Oxygen Delivery Method): room air        PHYSICAL EXAM:  GENERAL: NAD  HENT:  Atraumatic, Normocephalic; No tonsillar erythema, exudates, or enlargement; Moist mucous membranes;   EYES: EOMI, PERRLA, conjunctiva and sclera clear, no lid-lag  NECK: Supple, No JVD, Normal thyroid  NERVOUS SYSTEM:  CN II - XII intact; Sensation intact; Motor Strength 5/5 B/L upper and lower extremities  CHEST/LUNG: Clear to percussion bilaterally; No rales, rhonchi, wheezing, or rubs; normal respiratory effort, no intercostal retractions; No pitting edema  HEART: Regular rate and rhythm; No murmurs, rubs, or gallops  ABDOMEN: Soft, Nontender, Nondistended; Bowel sounds present; No HSM  MUSCULOSKELETAL/EXTREMITIES:  2+ Peripheral Pulses, No clubbing, or digital cyanosis  Examination of the joints, bones, and muscles of one or more of the following six areas:  1. head and neck 2. spine, ribs, and pelvis 3. right upper extremity 4. left upper extremity 5. right lower extremity 6. left lower extremity   ROM (pain, crepitation or contracture)  SKIN: No rashes or lesions; normal texture and temperature  PSYCH: Appropriate affect, Alert & Oriented x 3

## 2023-06-18 NOTE — ED PROVIDER NOTE - OBJECTIVE STATEMENT
83-year-old female with a past medical history of hypertension, diabetes, hypothyroidism hyperlipidemia, and a distant history of neck cancer presents to the ED for evaluation of injury sustained in a fall, as well as a cough.  The fall occurred last Monday, she describes a mechanical fall in which she tripped over her shoes, landing on her knees and breaking the fall with her outstretched arms.  She remembers the entire event, did not lose or lose consciousness.  She was ambulatory immediately after the fall.  States the pain in her wrists and knees has been improving.  She also reports 1 week of intermittent cough productive of sputum, associated with pain in the right side of her back and right shoulder.  She notes that she has had extensive surgery to the right side of her neck 20 years ago to remove cancer, and since then she has had some loss of function in the right shoulder, however this pain is new.  She denies chest pain, dyspnea, abdominal pain, arm/jaw pain, or nausea, though she admits to vomiting once last night and attributes this to her dinner.

## 2023-06-18 NOTE — ED PROVIDER NOTE - CLINICAL SUMMARY MEDICAL DECISION MAKING FREE TEXT BOX
EKG shows a normal sinus rhythm with a first-degree block  and a left bundle, largely unchanged from prior.  There are no ischemic changes, Trop is negative.  Chest x-ray shows a right-sided interstitial infiltrate, and she has a leukocytosis 18.3 K with a shift.  The remainder of her blood work is unremarkable.  She received her first dose of antibiotics in the ED.  I discussed the results with patient and her family, they expressed understanding.  Given her Curb-65 score of 3, I believe admission to the floor for continued antibiotics is prudent.  Case endorsed to the inpatient hospitalist and accepted for admission.

## 2023-06-18 NOTE — ED PROVIDER NOTE - PLAN OF CARE
This is an 83-year-old female presenting to the ED for evaluation of injury sustained in a fall, as well as a cough.  The fall appears mechanical, and there do not appear to be any major injury sustained.  I will defer x-ray imaging in accordance with [place] guidelines. Differential for acute cough includes URI, bronchitis, pneumonia, influenza, allergies, asthma. Radiation of pain to the shoulder may suggest diaphragmatic irritation secondary to pneumonia, also consider inferior MI.  We will send broad labs to assess for evidence of infection, anemia, electrolyte derangements, cardiac ischemia.  Pending EKG and chest x-ray.

## 2023-06-19 ENCOUNTER — TRANSCRIPTION ENCOUNTER (OUTPATIENT)
Age: 83
End: 2023-06-19

## 2023-06-19 VITALS
HEART RATE: 65 BPM | OXYGEN SATURATION: 99 % | RESPIRATION RATE: 18 BRPM | TEMPERATURE: 98 F | DIASTOLIC BLOOD PRESSURE: 66 MMHG | SYSTOLIC BLOOD PRESSURE: 161 MMHG

## 2023-06-19 LAB
ALBUMIN SERPL ELPH-MCNC: 2.8 G/DL — LOW (ref 3.3–5)
ALP SERPL-CCNC: 64 U/L — SIGNIFICANT CHANGE UP (ref 40–120)
ALT FLD-CCNC: 43 U/L — SIGNIFICANT CHANGE UP (ref 10–45)
ANION GAP SERPL CALC-SCNC: 5 MMOL/L — SIGNIFICANT CHANGE UP (ref 5–17)
AST SERPL-CCNC: 35 U/L — SIGNIFICANT CHANGE UP (ref 10–40)
BILIRUB DIRECT SERPL-MCNC: 0.1 MG/DL — SIGNIFICANT CHANGE UP (ref 0–0.3)
BILIRUB INDIRECT FLD-MCNC: 0.3 MG/DL — SIGNIFICANT CHANGE UP (ref 0.2–1)
BILIRUB SERPL-MCNC: 0.4 MG/DL — SIGNIFICANT CHANGE UP (ref 0.2–1.2)
BUN SERPL-MCNC: 28 MG/DL — HIGH (ref 7–23)
CALCIUM SERPL-MCNC: 8.6 MG/DL — SIGNIFICANT CHANGE UP (ref 8.4–10.5)
CHLORIDE SERPL-SCNC: 105 MMOL/L — SIGNIFICANT CHANGE UP (ref 96–108)
CO2 SERPL-SCNC: 30 MMOL/L — SIGNIFICANT CHANGE UP (ref 22–31)
CREAT SERPL-MCNC: 0.83 MG/DL — SIGNIFICANT CHANGE UP (ref 0.5–1.3)
EGFR: 70 ML/MIN/1.73M2 — SIGNIFICANT CHANGE UP
GLUCOSE BLDC GLUCOMTR-MCNC: 135 MG/DL — HIGH (ref 70–99)
GLUCOSE BLDC GLUCOMTR-MCNC: 138 MG/DL — HIGH (ref 70–99)
GLUCOSE BLDC GLUCOMTR-MCNC: 184 MG/DL — HIGH (ref 70–99)
GLUCOSE SERPL-MCNC: 148 MG/DL — HIGH (ref 70–99)
HCT VFR BLD CALC: 31.6 % — LOW (ref 34.5–45)
HGB BLD-MCNC: 10.2 G/DL — LOW (ref 11.5–15.5)
MCHC RBC-ENTMCNC: 29.7 PG — SIGNIFICANT CHANGE UP (ref 27–34)
MCHC RBC-ENTMCNC: 32.3 GM/DL — SIGNIFICANT CHANGE UP (ref 32–36)
MCV RBC AUTO: 91.9 FL — SIGNIFICANT CHANGE UP (ref 80–100)
NRBC # BLD: 0 /100 WBCS — SIGNIFICANT CHANGE UP (ref 0–0)
PLATELET # BLD AUTO: 155 K/UL — SIGNIFICANT CHANGE UP (ref 150–400)
POTASSIUM SERPL-MCNC: 3.9 MMOL/L — SIGNIFICANT CHANGE UP (ref 3.5–5.3)
POTASSIUM SERPL-SCNC: 3.9 MMOL/L — SIGNIFICANT CHANGE UP (ref 3.5–5.3)
PROT SERPL-MCNC: 6.1 G/DL — SIGNIFICANT CHANGE UP (ref 6–8.3)
RBC # BLD: 3.44 M/UL — LOW (ref 3.8–5.2)
RBC # FLD: 12.4 % — SIGNIFICANT CHANGE UP (ref 10.3–14.5)
SODIUM SERPL-SCNC: 140 MMOL/L — SIGNIFICANT CHANGE UP (ref 135–145)
WBC # BLD: 10.93 K/UL — HIGH (ref 3.8–10.5)
WBC # FLD AUTO: 10.93 K/UL — HIGH (ref 3.8–10.5)

## 2023-06-19 PROCEDURE — 80048 BASIC METABOLIC PNL TOTAL CA: CPT

## 2023-06-19 PROCEDURE — 99239 HOSP IP/OBS DSCHRG MGMT >30: CPT

## 2023-06-19 PROCEDURE — 85027 COMPLETE CBC AUTOMATED: CPT

## 2023-06-19 PROCEDURE — 71046 X-RAY EXAM CHEST 2 VIEWS: CPT

## 2023-06-19 PROCEDURE — 93005 ELECTROCARDIOGRAM TRACING: CPT

## 2023-06-19 PROCEDURE — 84484 ASSAY OF TROPONIN QUANT: CPT

## 2023-06-19 PROCEDURE — 82962 GLUCOSE BLOOD TEST: CPT

## 2023-06-19 PROCEDURE — 83036 HEMOGLOBIN GLYCOSYLATED A1C: CPT

## 2023-06-19 PROCEDURE — 80053 COMPREHEN METABOLIC PANEL: CPT

## 2023-06-19 PROCEDURE — 80076 HEPATIC FUNCTION PANEL: CPT

## 2023-06-19 PROCEDURE — 99285 EMERGENCY DEPT VISIT HI MDM: CPT

## 2023-06-19 PROCEDURE — 83735 ASSAY OF MAGNESIUM: CPT

## 2023-06-19 PROCEDURE — 96374 THER/PROPH/DIAG INJ IV PUSH: CPT

## 2023-06-19 PROCEDURE — 85025 COMPLETE CBC W/AUTO DIFF WBC: CPT

## 2023-06-19 PROCEDURE — 36415 COLL VENOUS BLD VENIPUNCTURE: CPT

## 2023-06-19 PROCEDURE — 97162 PT EVAL MOD COMPLEX 30 MIN: CPT

## 2023-06-19 RX ORDER — INSULIN DETEMIR 100/ML (3)
11 INSULIN PEN (ML) SUBCUTANEOUS
Qty: 0 | Refills: 0 | DISCHARGE

## 2023-06-19 RX ORDER — LEVOFLOXACIN 5 MG/ML
1 INJECTION, SOLUTION INTRAVENOUS
Qty: 3 | Refills: 0
Start: 2023-06-19 | End: 2023-06-21

## 2023-06-19 RX ORDER — LIDOCAINE 4 G/100G
2 CREAM TOPICAL
Qty: 12 | Refills: 0
Start: 2023-06-19 | End: 2023-06-30

## 2023-06-19 RX ORDER — LIDOCAINE 4 G/100G
1 CREAM TOPICAL DAILY
Refills: 0 | Status: DISCONTINUED | OUTPATIENT
Start: 2023-06-19 | End: 2023-06-19

## 2023-06-19 RX ADMIN — LIDOCAINE 1 PATCH: 4 CREAM TOPICAL at 10:48

## 2023-06-19 RX ADMIN — LIDOCAINE 1 PATCH: 4 CREAM TOPICAL at 12:27

## 2023-06-19 RX ADMIN — Medication 5 MILLIGRAM(S): at 05:28

## 2023-06-19 RX ADMIN — Medication 100 MICROGRAM(S): at 05:28

## 2023-06-19 RX ADMIN — LIDOCAINE 1 PATCH: 4 CREAM TOPICAL at 05:32

## 2023-06-19 NOTE — DISCHARGE NOTE NURSING/CASE MANAGEMENT/SOCIAL WORK - PATIENT PORTAL LINK FT
independent/needs device
You can access the FollowMyHealth Patient Portal offered by [facility] by registering at the following website: [email or web address]. By joining [employer]’s FollowMyHealth portal, you will also be able to view your health information using other applications (apps) compatible with our system.

## 2023-06-19 NOTE — DISCHARGE NOTE PROVIDER - NSDCCPCAREPLAN_GEN_ALL_CORE_FT
PRINCIPAL DISCHARGE DIAGNOSIS  Diagnosis: Pneumonia  Assessment and Plan of Treatment: Continue to take oral antibiotic starting tomorrow around 1pm for 3 more days for a total of 5 days. Use tylenol as needed for pain. Use lidocaine patches to right knee and right shoulder. Do not use patch overnight. Keep appointment with PCP.

## 2023-06-19 NOTE — PHYSICAL THERAPY INITIAL EVALUATION ADULT - PERTINENT HX OF CURRENT PROBLEM, REHAB EVAL
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 Patient had some pain in the knees but that is better but her shoulder pain is worse. She reports chills and chronic cough. She denies fever, chest pain, abdominal pain.

## 2023-06-19 NOTE — PHYSICAL THERAPY INITIAL EVALUATION ADULT - ADDITIONAL COMMENTS
pt lives w/dtr and grandchildren in private house, 2 [address] w/rail, 1st floor setup. 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

## 2023-06-19 NOTE — PHYSICAL THERAPY INITIAL EVALUATION ADULT - GENERAL OBSERVATIONS, REHAB EVAL
Will fill two below medications but this will be the last fill until pt is seen in clinic. Patient's daughter was contacted and made aware of this. Pt has not been seen in clinic since jan 2016. Patient acknowledged only the 6 months will be given.    pt in chair, no c/c at rest, a+ox4, vss, +IV

## 2023-06-19 NOTE — DISCHARGE NOTE PROVIDER - HOSPITAL COURSE
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 Patient had some pain in the knees but that is better but her shoulder pain is worse. She reports chills and chronic cough. She denies fever, chest pain, abdominal pain.    In the ED, pt was afebrile, HR 85, /65, RR 16, O2 sat 92% on ra. CXR shows Right base infiltrate and WBC 18. Patient admitted for CAP   (18 Jun 2023 15:38)    Patient was started on levaquin, given lidocaine patch for her knee and right shoulder. PT saw patient and suggested home PT. Patient can be discharged home with outpatient follow up with PCP    You were admitted for right shoulder pain  You were diagnosed with CAP  You were treated with lV levaquin    You will need to follow up with your primary care physician.    Source of Infection:  Antibiotic / Last Day: levaquin 750mg daily/ June 22, 2023 for total of 5 days      Discharging Provider:  Eufemia Baird MD  Contact Info: Cell [phone] - Please call with any questions or concerns.    Outpatient Provider: Dr. Plasencia

## 2023-06-19 NOTE — DISCHARGE NOTE PROVIDER - NSDCMRMEDTOKEN_GEN_ALL_CORE_FT
enalapril 5 mg oral tablet: 1 tab(s) orally once a day  Levemir 100 units/mL subcutaneous solution: 11 unit(s) subcutaneous once a day  levoFLOXacin 750 mg oral tablet: 1 tab(s) orally once a day please take around 1pm starting 6/20/23  lidocaine 4% topical film: Apply topically to affected area once a day 1 path to right shoulder, 1 patch to right knee.  rosuvastatin 5 mg oral tablet: 1 tab(s) orally once a day  Synthroid 100 mcg (0.1 mg) oral tablet: 1 tab(s) orally once a day

## 2023-06-26 ENCOUNTER — RX RENEWAL (OUTPATIENT)
Age: 83
End: 2023-06-26

## 2023-06-30 ENCOUNTER — APPOINTMENT (OUTPATIENT)
Dept: FAMILY MEDICINE | Facility: CLINIC | Age: 83
End: 2023-06-30
Payer: MEDICARE

## 2023-06-30 VITALS
SYSTOLIC BLOOD PRESSURE: 142 MMHG | HEIGHT: 65 IN | RESPIRATION RATE: 16 BRPM | HEART RATE: 69 BPM | BODY MASS INDEX: 23.49 KG/M2 | WEIGHT: 141 LBS | DIASTOLIC BLOOD PRESSURE: 80 MMHG | TEMPERATURE: 98.4 F | OXYGEN SATURATION: 97 %

## 2023-06-30 PROCEDURE — 99495 TRANSJ CARE MGMT MOD F2F 14D: CPT

## 2023-06-30 NOTE — PHYSICAL EXAM
[No Acute Distress] : no acute distress [Well Nourished] : well nourished [Well Developed] : well developed [Normal Voice/Communication] : normal voice/communication [Normal Sclera/Conjunctiva] : normal sclera/conjunctiva [Normal Oropharynx] : the oropharynx was normal [No JVD] : no jugular venous distention [No Accessory Muscle Use] : no accessory muscle use [Clear to Auscultation] : lungs were clear to auscultation bilaterally [Normal Rate] : normal rate  [Regular Rhythm] : with a regular rhythm [No Murmur] : no murmur heard [No Edema] : there was no peripheral edema [Soft] : abdomen soft [No Masses] : no abdominal mass palpated [No HSM] : no HSM

## 2023-06-30 NOTE — REVIEW OF SYSTEMS
[Negative] : Genitourinary [FreeTextEntry9] : Some continued pain in the right knee and also right shoulder area

## 2023-06-30 NOTE — HISTORY OF PRESENT ILLNESS
[Post-hospitalization from ___ Hospital] : Post-hospitalization from [unfilled] Hospital [Admitted on: ___] : The patient was admitted on [unfilled] [Discharged on ___] : discharged on [unfilled] [Discharge Summary] : discharge summary [Patient Contacted By: ____] : and contacted by [unfilled] [FreeTextEntry2] : Patient here for post specialization evaluation she was admitted to [facility] on 618 with a right lower lobe pneumonia and discharged the next day she completed a course of Levaquin antibiotics she had previously had a fall where she had injured her right knee and the left and right shoulders and in fact went to the emergency room because of the shoulder pain and the pneumonia was discovered she has had an uneventful recovery and feels well she states her diabetes has been in good control she is followed up with Dr. Cowan on several ENT issues and will continue to follow-up with him

## 2023-06-30 NOTE — ASSESSMENT
[FreeTextEntry1] : Patient doing well post right lower lobe pneumonia she will be followed up in 4 weeks her cough is largely dissipated repeat chest x-ray will be done in 6 to 8 weeks she has been told that should she have any recurrence of symptoms or fever feel poorly she should call for a updated visit she will follow-up with Dr. Guerra with regard to her ENT issues

## 2023-07-28 ENCOUNTER — APPOINTMENT (OUTPATIENT)
Dept: FAMILY MEDICINE | Facility: CLINIC | Age: 83
End: 2023-07-28
Payer: MEDICARE

## 2023-07-28 VITALS
RESPIRATION RATE: 16 BRPM | SYSTOLIC BLOOD PRESSURE: 148 MMHG | OXYGEN SATURATION: 98 % | HEIGHT: 65 IN | HEART RATE: 72 BPM | TEMPERATURE: 98.4 F | DIASTOLIC BLOOD PRESSURE: 82 MMHG | WEIGHT: 144 LBS | BODY MASS INDEX: 23.99 KG/M2

## 2023-07-28 DIAGNOSIS — E13.621 OTHER SPECIFIED DIABETES MELLITUS WITH FOOT ULCER: Chronic | ICD-10-CM

## 2023-07-28 DIAGNOSIS — E03.9 HYPOTHYROIDISM, UNSPECIFIED: ICD-10-CM

## 2023-07-28 DIAGNOSIS — L97.509 OTHER SPECIFIED DIABETES MELLITUS WITH FOOT ULCER: Chronic | ICD-10-CM

## 2023-07-28 DIAGNOSIS — J18.9 PNEUMONIA, UNSPECIFIED ORGANISM: ICD-10-CM

## 2023-07-28 PROCEDURE — 99213 OFFICE O/P EST LOW 20 MIN: CPT

## 2023-07-28 NOTE — PHYSICAL EXAM
[No Acute Distress] : no acute distress [Well Developed] : well developed [Normal Outer Ear/Nose] : the outer ears and nose were normal in appearance [Normal Oropharynx] : the oropharynx was normal [Normal TMs] : both tympanic membranes were normal [No Lymphadenopathy] : no lymphadenopathy [No Respiratory Distress] : no respiratory distress  [No Accessory Muscle Use] : no accessory muscle use [Clear to Auscultation] : lungs were clear to auscultation bilaterally [No Edema] : there was no peripheral edema [Soft] : abdomen soft [Non Tender] : non-tender [Non-distended] : non-distended [No HSM] : no HSM [Normal Bowel Sounds] : normal bowel sounds [No CVA Tenderness] : no CVA  tenderness [No Spinal Tenderness] : no spinal tenderness [Coordination Grossly Intact] : coordination grossly intact

## 2023-07-28 NOTE — PLAN
[FreeTextEntry1] : Patient seems to be doing well her lungs are clear she feels reasonably well although somewhat fatigued we will send her for a chest x-ray to follow-up on her abnormality from 4 to 5 weeks ago and also for some routine laboratory work with regard to diabetic control follow-up visit here in 6 weeks.  Patient is following up with Dr. Guerra with regard to a left vocal cord paralysis and otherwise no new issues identified

## 2023-07-28 NOTE — HISTORY OF PRESENT ILLNESS
[de-identified] : Patient here for follow-up on pneumonia from about 5 weeks ago she is feeling well no major pulmonary symptoms she is on her usual diabetic medications

## 2023-08-13 LAB
ALBUMIN SERPL ELPH-MCNC: 4.4 G/DL
ALP BLD-CCNC: 69 U/L
ALT SERPL-CCNC: 9 U/L
ANION GAP SERPL CALC-SCNC: 10 MMOL/L
AST SERPL-CCNC: 17 U/L
BILIRUB SERPL-MCNC: 0.3 MG/DL
BUN SERPL-MCNC: 19 MG/DL
CALCIUM SERPL-MCNC: 9.5 MG/DL
CHLORIDE SERPL-SCNC: 102 MMOL/L
CHOLEST SERPL-MCNC: 136 MG/DL
CO2 SERPL-SCNC: 28 MMOL/L
CREAT SERPL-MCNC: 0.98 MG/DL
EGFR: 57 ML/MIN/1.73M2
ESTIMATED AVERAGE GLUCOSE: 189 MG/DL
GLUCOSE SERPL-MCNC: 153 MG/DL
HBA1C MFR BLD HPLC: 8.2 %
HDLC SERPL-MCNC: 66 MG/DL
LDLC SERPL CALC-MCNC: 57 MG/DL
NONHDLC SERPL-MCNC: 70 MG/DL
POTASSIUM SERPL-SCNC: 5.2 MMOL/L
PROT SERPL-MCNC: 6.8 G/DL
SODIUM SERPL-SCNC: 141 MMOL/L
TRIGL SERPL-MCNC: 63 MG/DL
TSH SERPL-ACNC: 3.37 UIU/ML

## 2023-09-11 ENCOUNTER — APPOINTMENT (OUTPATIENT)
Dept: FAMILY MEDICINE | Facility: CLINIC | Age: 83
End: 2023-09-11
Payer: MEDICARE

## 2023-09-11 VITALS
SYSTOLIC BLOOD PRESSURE: 142 MMHG | BODY MASS INDEX: 23.32 KG/M2 | HEART RATE: 84 BPM | TEMPERATURE: 97.7 F | OXYGEN SATURATION: 95 % | DIASTOLIC BLOOD PRESSURE: 80 MMHG | HEIGHT: 65 IN | WEIGHT: 140 LBS | RESPIRATION RATE: 16 BRPM

## 2023-09-11 DIAGNOSIS — N18.30 TYPE 1 DIABETES MELLITUS WITH DIABETIC CHRONIC KIDNEY DISEASE: ICD-10-CM

## 2023-09-11 DIAGNOSIS — E10.22 TYPE 1 DIABETES MELLITUS WITH DIABETIC CHRONIC KIDNEY DISEASE: ICD-10-CM

## 2023-09-11 DIAGNOSIS — I12.9 TYPE 1 DIABETES MELLITUS WITH DIABETIC CHRONIC KIDNEY DISEASE: ICD-10-CM

## 2023-09-11 DIAGNOSIS — H61.23 IMPACTED CERUMEN, BILATERAL: ICD-10-CM

## 2023-09-11 DIAGNOSIS — R05.3 CHRONIC COUGH: ICD-10-CM

## 2023-09-11 PROCEDURE — 99213 OFFICE O/P EST LOW 20 MIN: CPT

## 2023-09-18 ENCOUNTER — RX RENEWAL (OUTPATIENT)
Age: 83
End: 2023-09-18

## 2023-09-29 ENCOUNTER — RX RENEWAL (OUTPATIENT)
Age: 83
End: 2023-09-29

## 2023-09-29 RX ORDER — LEVOTHYROXINE SODIUM 0.1 MG/1
100 TABLET ORAL
Qty: 90 | Refills: 2 | Status: ACTIVE | COMMUNITY
Start: 2017-06-12 | End: 1900-01-01

## 2023-11-03 ENCOUNTER — RX RENEWAL (OUTPATIENT)
Age: 83
End: 2023-11-03

## 2023-11-07 RX ORDER — BLOOD SUGAR DIAGNOSTIC
STRIP MISCELLANEOUS TWICE DAILY
Qty: 60 | Refills: 0 | Status: ACTIVE | COMMUNITY
Start: 2023-03-20 | End: 1900-01-01

## 2023-11-21 NOTE — CHART NOTE - NSCHARTNOTESELECT_GEN_ALL_CORE
Annual Exam    Assessment   1. Encounter for annual routine gynecological examination        2. Mass of right breast, unspecified quadrant  Mammo diagnostic bilateral w 3d & cad    US breast right limited (diagnostic)      3. Homeless  Ambulatory Referral to [address] [place] [address]        well woman       Plan       All questions answered. Breast self exam technique reviewed and patient encouraged to perform self-exam monthly. Contraception: tubal ligation. Discussed healthy lifestyle modifications. Follow up in 1 year. Mammogram.  R breast U/S     Patient Instructions   Viky from social work will call you  Schedule mammogram and R breast U/S  Call with needs or concerns  Return in 1 year  Pt verbalized understanding of all discussed. Marleen Loaiza is a 43 y.o. [id number] female who presents for annual well woman exam. Periods are regular every 28-30 days, lasting 3 days. No intermenstrual bleeding, spotting, or discharge. 9/30/2021 Last WNL PAP and negative HPV  1 partner x 3 years    Depression Screening Follow-up Plan: Patient's depression screening was negative with a PHQ-2 score of 2. Their PHQ-9 score was 9. Clinically patient does not have depression. No treatment is required. Pt states she is currently homeless, social work referral provided    BMI Counseling: Body mass index is 31.55 kg/m². The BMI is above normal. Nutrition recommendations include reducing portion sizes, decreasing overall calorie intake, 3-5 servings of fruits/vegetables daily, consuming healthier snacks, moderation in carbohydrate intake, and increasing intake of lean protein. Exercise recommendations include moderate aerobic physical activity for 150 minutes/week.       Current contraception: tubal ligation  History of abnormal Pap smear: no  Family history of uterine or ovarian cancer: no  Regular self breast exam: yes  History of abnormal mammogram: R breast mass  Family history of breast cancer: Event Note no  History of abnormal lipids: unknown  Menstrual History:  OB History          6    Para   3    Term   3            AB   3    Living   3         SAB   2    IAB   1    Ectopic        Multiple        Live Births   1                Menarche age: 12  Patient's last menstrual period was 10/16/2023 (approximate). The following portions of the patient's history were reviewed and updated as appropriate: allergies, current medications, past family history, past medical history, past social history, past surgical history and problem list.    Review of Systems  Pertinent items are noted in HPI.       Objective      /84   Pulse 85   Ht 5' 5" (1.651 m)   Wt 86 kg (189 lb 9.6 oz)   LMP 10/16/2023 (Approximate)   BMI 31.55 kg/m²     General: alert and oriented, in no acute distress, alert, appears stated age, and cooperative   Heart: NSR   Lungs: clear to auscultation bilaterally, WNL respiratory effort, negative cough or SOB   Thyroid: Negative masses palpable   Abdomen: soft, non-tender, without masses or organomegaly palpable   Vulva: normal   Vagina: normal mucosa   Cervix: no cervical motion tenderness and no lesions   Uterus: normal size, non-tender, normal shape and consistency   Adnexa: normal adnexa   Urethra: normal   Breasts: NT,negative masses palpable, negativedischarge, or dimpling

## 2023-12-17 ENCOUNTER — RX RENEWAL (OUTPATIENT)
Age: 83
End: 2023-12-17

## 2023-12-17 RX ORDER — BLOOD SUGAR DIAGNOSTIC
STRIP MISCELLANEOUS
Qty: 100 | Refills: 2 | Status: ACTIVE | COMMUNITY
Start: 2023-11-03 | End: 1900-01-01

## 2023-12-19 RX ORDER — ROSUVASTATIN CALCIUM 5 MG/1
5 TABLET, FILM COATED ORAL
Qty: 90 | Refills: 0 | Status: ACTIVE | COMMUNITY
Start: 2017-12-11 | End: 1900-01-01

## 2023-12-29 ENCOUNTER — NON-APPOINTMENT (OUTPATIENT)
Age: 83
End: 2023-12-29

## 2024-01-01 ENCOUNTER — EMERGENCY (EMERGENCY)
Facility: HOSPITAL | Age: 84
LOS: 1 days | Discharge: HOME | End: 2024-01-01
Attending: EMERGENCY MEDICINE | Admitting: EMERGENCY MEDICINE
Payer: MEDICARE

## 2024-01-01 VITALS
WEIGHT: 139.99 LBS | DIASTOLIC BLOOD PRESSURE: 71 MMHG | OXYGEN SATURATION: 95 % | TEMPERATURE: 98 F | RESPIRATION RATE: 18 BRPM | HEART RATE: 77 BPM | SYSTOLIC BLOOD PRESSURE: 187 MMHG | HEIGHT: 64 IN

## 2024-01-01 DIAGNOSIS — Z90.49 ACQUIRED ABSENCE OF OTHER SPECIFIED PARTS OF DIGESTIVE TRACT: Chronic | ICD-10-CM

## 2024-01-01 DIAGNOSIS — Z41.9 ENCOUNTER FOR PROCEDURE FOR PURPOSES OTHER THAN REMEDYING HEALTH STATE, UNSPECIFIED: Chronic | ICD-10-CM

## 2024-01-01 PROCEDURE — 99283 EMERGENCY DEPT VISIT LOW MDM: CPT

## 2024-01-01 PROCEDURE — 99282 EMERGENCY DEPT VISIT SF MDM: CPT

## 2024-01-01 NOTE — ED PROVIDER NOTE - OBJECTIVE STATEMENT
pt states that she had tested positive for covid over 10 days ago, pt states that she is feeling much better and her energy is back to normal but comes to ER bc she wants to go back to work but still has a lingering cough and is worried she is still contagious.

## 2024-01-01 NOTE — ED PROVIDER NOTE - PATIENT PORTAL LINK FT
You can access the FollowMyHealth Patient Portal offered by [facility] by registering at the following website: [email or web address]. By joining [employer]’s FollowMyHealth portal, you will also be able to view your health information using other applications (apps) compatible with our system. You can access the FollowMyHealth Patient Portal offered by [facility] by registering at the following website: [email or web address]. By joining [employer]’s FollowMyHealth portal, you will also be able to view your health information using other applications (apps) compatible with our system.

## 2024-01-01 NOTE — ED PROVIDER NOTE - NSFOLLOWUPINSTRUCTIONS_ED_ALL_ED_FT
-- You should update your primary care physician on your Emergency Department visit and follow up with them.  If you do not have a physician or have difficulty following up, please call: [phone] to obtain a [facility] doctor or specialist who can provide follow up.    -- Return to the ER for worsening or persistent symptoms, and/or ANY NEW OR CONCERNING SYMPTOMS. -- You should update your primary care physician on your Emergency Department visit and follow up with them.  If you do not have a physician or have difficulty following up, please call: [phone] to obtain a [facility] doctor or specialist who can provide follow up.    -- Return to the ER for worsening or persistent symptoms, and/or ANY NEW OR CONCERNING SYMPTOMS.

## 2024-01-01 NOTE — ED ADULT NURSE NOTE - NSFALLUNIVINTERV_ED_ALL_ED
Bed/Stretcher in lowest position, wheels locked, appropriate side rails in place/Call bell, personal items and telephone in reach/Instruct patient to call for assistance before getting out of bed/chair/stretcher/Non-slip footwear applied when patient is off stretcher/[place] to call system/Physically safe environment - no spills, clutter or unnecessary equipment/Purposeful proactive rounding/Room/bathroom lighting operational, light cord in reach Bed/Stretcher in lowest position, wheels locked, appropriate side rails in place/Call bell, personal items and telephone in reach/Instruct patient to call for assistance before getting out of bed/chair/stretcher/Non-slip footwear applied when patient is off stretcher/[place] to call system/Physically safe environment - no spills, clutter or unnecessary equipment/Purposeful proactive rounding/Room/bathroom lighting operational, light cord in reach

## 2024-01-01 NOTE — ED PROVIDER NOTE - PHYSICAL EXAMINATION
Gen: alert, NAD  HEENT:  NC/AT, PERR  CV:  well perfused  Pulm:  normal RR, breathing comfortably, CTA b/l  Abd: s/nt/nd  MSK: moving all extremities  Neuro:  non-focal  Skin:  visualized areas intact  Psych: AOx3

## 2024-01-01 NOTE — ED ADULT NURSE NOTE - NS ED NOTE  TALK SOMEONE YN
No Was the patient seen in the last year in this department? Yes     Does patient have an active prescription for medications requested? No     Received Request Via: Pharmacy

## 2024-01-01 NOTE — ED ADULT NURSE NOTE - ISAR VISION
[facility] Emergency Department    [address] [address]    Phone:  [phone]    Fax:  [phone]                                       Cecilia Blackmon   MRN: [id number]    Department:  [facility] Emergency Department   Date of Visit:  8/21/2018           After Visit Summary Signature Page     I have received my discharge instructions, and my questions have been answered. I have discussed any challenges I see with this plan with the nurse or doctor.    ..........................................................................................................................................  Patient/Patient Representative Signature      ..........................................................................................................................................  Patient Representative Print Name and Relationship to Patient    ..................................................               ................................................  Date                                            Time    ..........................................................................................................................................  Reviewed by Signature/Title    ...................................................              ..............................................  Date                                                            Time           No

## 2024-01-01 NOTE — ED ADULT NURSE NOTE - OBJECTIVE STATEMENT
Alert and oriented, HRR, respirations even and unlabored but with complaints of persistent cough after covid diagnosis. Abdomen soft and nontender, no extremity swelling.

## 2024-01-08 NOTE — CHART NOTE - NSCHARTNOTEFT_GEN_A_CORE
83 y o female presenting to the ED on 1/1/24 complaining of cough.  A follow up call was made to assist patient with scheduling the recommended primary care appointment and received no answer.  Contact information was provided via voicemail.

## 2024-02-08 ENCOUNTER — INPATIENT (INPATIENT)
Facility: HOSPITAL | Age: 84
LOS: 0 days | DRG: 208 | End: 2024-02-09
Attending: STUDENT IN AN ORGANIZED HEALTH CARE EDUCATION/TRAINING PROGRAM | Admitting: STUDENT IN AN ORGANIZED HEALTH CARE EDUCATION/TRAINING PROGRAM
Payer: MEDICARE

## 2024-02-08 VITALS
WEIGHT: 160.06 LBS | DIASTOLIC BLOOD PRESSURE: 46 MMHG | RESPIRATION RATE: 14 BRPM | HEIGHT: 64 IN | SYSTOLIC BLOOD PRESSURE: 70 MMHG | OXYGEN SATURATION: 74 % | HEART RATE: 81 BPM

## 2024-02-08 DIAGNOSIS — I46.9 CARDIAC ARREST, CAUSE UNSPECIFIED: ICD-10-CM

## 2024-02-08 DIAGNOSIS — Z41.9 ENCOUNTER FOR PROCEDURE FOR PURPOSES OTHER THAN REMEDYING HEALTH STATE, UNSPECIFIED: Chronic | ICD-10-CM

## 2024-02-08 DIAGNOSIS — Z90.49 ACQUIRED ABSENCE OF OTHER SPECIFIED PARTS OF DIGESTIVE TRACT: Chronic | ICD-10-CM

## 2024-02-08 LAB
ALBUMIN SERPL ELPH-MCNC: 2.4 G/DL — LOW (ref 3.3–5)
ALBUMIN SERPL ELPH-MCNC: 2.6 G/DL — LOW (ref 3.3–5)
ALP SERPL-CCNC: 116 U/L — SIGNIFICANT CHANGE UP (ref 30–120)
ALP SERPL-CCNC: 97 U/L — SIGNIFICANT CHANGE UP (ref 30–120)
ALT FLD-CCNC: 102 U/L — HIGH (ref 10–60)
ALT FLD-CCNC: 42 U/L — SIGNIFICANT CHANGE UP (ref 10–60)
ANION GAP SERPL CALC-SCNC: 17 MMOL/L — SIGNIFICANT CHANGE UP (ref 5–17)
ANION GAP SERPL CALC-SCNC: 9 MMOL/L — SIGNIFICANT CHANGE UP (ref 5–17)
AST SERPL-CCNC: 210 U/L — HIGH (ref 10–40)
AST SERPL-CCNC: 78 U/L — HIGH (ref 10–40)
BASE EXCESS BLDA CALC-SCNC: -18.9 MMOL/L — LOW (ref -2–3)
BASE EXCESS BLDA CALC-SCNC: -6.5 MMOL/L — LOW (ref -2–3)
BASE EXCESS BLDA CALC-SCNC: -6.8 MMOL/L — LOW (ref -2–3)
BASOPHILS # BLD AUTO: 0.09 K/UL — SIGNIFICANT CHANGE UP (ref 0–0.2)
BASOPHILS NFR BLD AUTO: 1 % — SIGNIFICANT CHANGE UP (ref 0–2)
BILIRUB SERPL-MCNC: 0.2 MG/DL — SIGNIFICANT CHANGE UP (ref 0.2–1.2)
BILIRUB SERPL-MCNC: 0.5 MG/DL — SIGNIFICANT CHANGE UP (ref 0.2–1.2)
BUN SERPL-MCNC: 20 MG/DL — SIGNIFICANT CHANGE UP (ref 7–23)
BUN SERPL-MCNC: 33 MG/DL — HIGH (ref 7–23)
CALCIUM SERPL-MCNC: 7.1 MG/DL — LOW (ref 8.4–10.5)
CALCIUM SERPL-MCNC: 8.2 MG/DL — LOW (ref 8.4–10.5)
CHLORIDE SERPL-SCNC: 103 MMOL/L — SIGNIFICANT CHANGE UP (ref 96–108)
CHLORIDE SERPL-SCNC: 108 MMOL/L — SIGNIFICANT CHANGE UP (ref 96–108)
CO2 SERPL-SCNC: 22 MMOL/L — SIGNIFICANT CHANGE UP (ref 22–31)
CO2 SERPL-SCNC: 23 MMOL/L — SIGNIFICANT CHANGE UP (ref 22–31)
CREAT SERPL-MCNC: 1.24 MG/DL — SIGNIFICANT CHANGE UP (ref 0.5–1.3)
CREAT SERPL-MCNC: 1.8 MG/DL — HIGH (ref 0.5–1.3)
EGFR: 28 ML/MIN/1.73M2 — LOW
EGFR: 43 ML/MIN/1.73M2 — LOW
EOSINOPHIL # BLD AUTO: 0.09 K/UL — SIGNIFICANT CHANGE UP (ref 0–0.5)
EOSINOPHIL NFR BLD AUTO: 1 % — SIGNIFICANT CHANGE UP (ref 0–6)
GLUCOSE BLDC GLUCOMTR-MCNC: 253 MG/DL — HIGH (ref 70–99)
GLUCOSE SERPL-MCNC: 362 MG/DL — HIGH (ref 70–99)
GLUCOSE SERPL-MCNC: 367 MG/DL — HIGH (ref 70–99)
HCO3 BLDA-SCNC: 13 MMOL/L — LOW (ref 21–28)
HCO3 BLDA-SCNC: 21 MMOL/L — SIGNIFICANT CHANGE UP (ref 21–28)
HCO3 BLDA-SCNC: 22 MMOL/L — SIGNIFICANT CHANGE UP (ref 21–28)
HCT VFR BLD CALC: 34.5 % — SIGNIFICANT CHANGE UP (ref 34.5–45)
HCT VFR BLD CALC: 41.7 % — SIGNIFICANT CHANGE UP (ref 34.5–45)
HGB BLD-MCNC: 10.5 G/DL — LOW (ref 11.5–15.5)
HGB BLD-MCNC: 12.5 G/DL — SIGNIFICANT CHANGE UP (ref 11.5–15.5)
HOROWITZ INDEX BLDA+IHG-RTO: 100 — SIGNIFICANT CHANGE UP
LACTATE SERPL-SCNC: 4.4 MMOL/L — CRITICAL HIGH (ref 0.7–2)
LACTATE SERPL-SCNC: 5.6 MMOL/L — CRITICAL HIGH (ref 0.7–2)
LACTATE SERPL-SCNC: 7.5 MMOL/L — CRITICAL HIGH (ref 0.7–2)
LYMPHOCYTES # BLD AUTO: 3.21 K/UL — SIGNIFICANT CHANGE UP (ref 1–3.3)
LYMPHOCYTES # BLD AUTO: 37 % — SIGNIFICANT CHANGE UP (ref 13–44)
MAGNESIUM SERPL-MCNC: 1.8 MG/DL — SIGNIFICANT CHANGE UP (ref 1.6–2.6)
MAGNESIUM SERPL-MCNC: 2.6 MG/DL — SIGNIFICANT CHANGE UP (ref 1.6–2.6)
MANUAL SMEAR VERIFICATION: YES — SIGNIFICANT CHANGE UP
MCHC RBC-ENTMCNC: 28.3 PG — SIGNIFICANT CHANGE UP (ref 27–34)
MCHC RBC-ENTMCNC: 29 PG — SIGNIFICANT CHANGE UP (ref 27–34)
MCHC RBC-ENTMCNC: 30 GM/DL — LOW (ref 32–36)
MCHC RBC-ENTMCNC: 30.4 GM/DL — LOW (ref 32–36)
MCV RBC AUTO: 94.3 FL — SIGNIFICANT CHANGE UP (ref 80–100)
MCV RBC AUTO: 95.3 FL — SIGNIFICANT CHANGE UP (ref 80–100)
MONOCYTES # BLD AUTO: 0.87 K/UL — SIGNIFICANT CHANGE UP (ref 0–0.9)
MONOCYTES NFR BLD AUTO: 10 % — SIGNIFICANT CHANGE UP (ref 2–14)
NEUTROPHILS # BLD AUTO: 4.34 K/UL — SIGNIFICANT CHANGE UP (ref 1.8–7.4)
NEUTROPHILS NFR BLD AUTO: 47 % — SIGNIFICANT CHANGE UP (ref 43–77)
NEUTS BAND # BLD: 3 % — SIGNIFICANT CHANGE UP (ref 0–8)
NRBC # BLD: 0 /100 WBCS — SIGNIFICANT CHANGE UP (ref 0–0)
NRBC # BLD: 0 /100 WBCS — SIGNIFICANT CHANGE UP (ref 0–0)
NRBC # BLD: SIGNIFICANT CHANGE UP /100 WBCS (ref 0–0)
PCO2 BLDA: 51 MMHG — HIGH (ref 32–35)
PCO2 BLDA: 55 MMHG — HIGH (ref 32–35)
PCO2 BLDA: 60 MMHG — HIGH (ref 32–35)
PH BLDA: 6.95 — CRITICAL LOW (ref 7.35–7.45)
PH BLDA: 7.2 — CRITICAL LOW (ref 7.35–7.45)
PH BLDA: 7.22 — LOW (ref 7.35–7.45)
PHOSPHATE SERPL-MCNC: 3.6 MG/DL — SIGNIFICANT CHANGE UP (ref 2.5–4.5)
PLAT MORPH BLD: NORMAL — SIGNIFICANT CHANGE UP
PLATELET # BLD AUTO: 160 K/UL — SIGNIFICANT CHANGE UP (ref 150–400)
PLATELET # BLD AUTO: 219 K/UL — SIGNIFICANT CHANGE UP (ref 150–400)
PLATELET COUNT - ESTIMATE: NORMAL — SIGNIFICANT CHANGE UP
PO2 BLDA: 104 MMHG — SIGNIFICANT CHANGE UP (ref 83–108)
PO2 BLDA: 117 MMHG — HIGH (ref 83–108)
PO2 BLDA: 57 MMHG — LOW (ref 83–108)
POTASSIUM SERPL-MCNC: 3.9 MMOL/L — SIGNIFICANT CHANGE UP (ref 3.5–5.3)
POTASSIUM SERPL-MCNC: 4.5 MMOL/L — SIGNIFICANT CHANGE UP (ref 3.5–5.3)
POTASSIUM SERPL-SCNC: 3.9 MMOL/L — SIGNIFICANT CHANGE UP (ref 3.5–5.3)
POTASSIUM SERPL-SCNC: 4.5 MMOL/L — SIGNIFICANT CHANGE UP (ref 3.5–5.3)
PROT SERPL-MCNC: 5.6 G/DL — LOW (ref 6–8.3)
PROT SERPL-MCNC: 5.7 G/DL — LOW (ref 6–8.3)
RBC # BLD: 3.62 M/UL — LOW (ref 3.8–5.2)
RBC # BLD: 4.42 M/UL — SIGNIFICANT CHANGE UP (ref 3.8–5.2)
RBC # FLD: 13.2 % — SIGNIFICANT CHANGE UP (ref 10.3–14.5)
RBC # FLD: 13.4 % — SIGNIFICANT CHANGE UP (ref 10.3–14.5)
RBC BLD AUTO: NORMAL — SIGNIFICANT CHANGE UP
SAO2 % BLDA: 74.9 % — LOW (ref 94–98)
SAO2 % BLDA: 99.1 % — HIGH (ref 94–98)
SAO2 % BLDA: 99.2 % — HIGH (ref 94–98)
SODIUM SERPL-SCNC: 140 MMOL/L — SIGNIFICANT CHANGE UP (ref 135–145)
SODIUM SERPL-SCNC: 142 MMOL/L — SIGNIFICANT CHANGE UP (ref 135–145)
TROPONIN I, HIGH SENSITIVITY RESULT: 11.2 NG/L — SIGNIFICANT CHANGE UP
VARIANT LYMPHS # BLD: 1 % — SIGNIFICANT CHANGE UP (ref 0–6)
WBC # BLD: 24.77 K/UL — HIGH (ref 3.8–10.5)
WBC # BLD: 8.68 K/UL — SIGNIFICANT CHANGE UP (ref 3.8–10.5)
WBC # FLD AUTO: 24.77 K/UL — HIGH (ref 3.8–10.5)
WBC # FLD AUTO: 8.68 K/UL — SIGNIFICANT CHANGE UP (ref 3.8–10.5)

## 2024-02-08 PROCEDURE — 71045 X-RAY EXAM CHEST 1 VIEW: CPT | Mod: 26,77

## 2024-02-08 PROCEDURE — 93306 TTE W/DOPPLER COMPLETE: CPT | Mod: 26

## 2024-02-08 PROCEDURE — 71045 X-RAY EXAM CHEST 1 VIEW: CPT | Mod: 26

## 2024-02-08 PROCEDURE — 99223 1ST HOSP IP/OBS HIGH 75: CPT | Mod: AI

## 2024-02-08 PROCEDURE — 70450 CT HEAD/BRAIN W/O DYE: CPT | Mod: 26,MA

## 2024-02-08 PROCEDURE — 92950 HEART/LUNG RESUSCITATION CPR: CPT

## 2024-02-08 PROCEDURE — 99291 CRITICAL CARE FIRST HOUR: CPT | Mod: FT,25

## 2024-02-08 PROCEDURE — 93010 ELECTROCARDIOGRAM REPORT: CPT

## 2024-02-08 PROCEDURE — 71250 CT THORAX DX C-: CPT | Mod: 26,MA

## 2024-02-08 RX ORDER — AZTREONAM 2 G
1000 VIAL (EA) INJECTION ONCE
Refills: 0 | Status: COMPLETED | OUTPATIENT
Start: 2024-02-08 | End: 2024-02-08

## 2024-02-08 RX ORDER — DEXTROSE 50 % IN WATER 50 %
25 SYRINGE (ML) INTRAVENOUS ONCE
Refills: 0 | Status: DISCONTINUED | OUTPATIENT
Start: 2024-02-08 | End: 2024-02-09

## 2024-02-08 RX ORDER — VANCOMYCIN HCL 1 G
1000 VIAL (EA) INTRAVENOUS ONCE
Refills: 0 | Status: COMPLETED | OUTPATIENT
Start: 2024-02-08 | End: 2024-02-08

## 2024-02-08 RX ORDER — DEXTROSE 50 % IN WATER 50 %
15 SYRINGE (ML) INTRAVENOUS ONCE
Refills: 0 | Status: DISCONTINUED | OUTPATIENT
Start: 2024-02-08 | End: 2024-02-09

## 2024-02-08 RX ORDER — SODIUM CHLORIDE 9 MG/ML
1000 INJECTION INTRAMUSCULAR; INTRAVENOUS; SUBCUTANEOUS ONCE
Refills: 0 | Status: COMPLETED | OUTPATIENT
Start: 2024-02-08 | End: 2024-02-08

## 2024-02-08 RX ORDER — ACETAMINOPHEN 500 MG
1000 TABLET ORAL ONCE
Refills: 0 | Status: COMPLETED | OUTPATIENT
Start: 2024-02-08 | End: 2024-02-08

## 2024-02-08 RX ORDER — DEXTROSE 50 % IN WATER 50 %
12.5 SYRINGE (ML) INTRAVENOUS ONCE
Refills: 0 | Status: DISCONTINUED | OUTPATIENT
Start: 2024-02-08 | End: 2024-02-09

## 2024-02-08 RX ORDER — CHLORHEXIDINE GLUCONATE 213 G/1000ML
1 SOLUTION TOPICAL
Refills: 0 | Status: DISCONTINUED | OUTPATIENT
Start: 2024-02-08 | End: 2024-02-09

## 2024-02-08 RX ORDER — NOREPINEPHRINE BITARTRATE/D5W 8 MG/250ML
0.5 PLASTIC BAG, INJECTION (ML) INTRAVENOUS
Qty: 8 | Refills: 0 | Status: DISCONTINUED | OUTPATIENT
Start: 2024-02-08 | End: 2024-02-08

## 2024-02-08 RX ORDER — SODIUM CHLORIDE 9 MG/ML
1000 INJECTION, SOLUTION INTRAVENOUS
Refills: 0 | Status: DISCONTINUED | OUTPATIENT
Start: 2024-02-08 | End: 2024-02-09

## 2024-02-08 RX ORDER — INSULIN LISPRO 100/ML
VIAL (ML) SUBCUTANEOUS EVERY 6 HOURS
Refills: 0 | Status: DISCONTINUED | OUTPATIENT
Start: 2024-02-08 | End: 2024-02-09

## 2024-02-08 RX ORDER — MEROPENEM 1 G/30ML
1000 INJECTION INTRAVENOUS EVERY 12 HOURS
Refills: 0 | Status: DISCONTINUED | OUTPATIENT
Start: 2024-02-09 | End: 2024-02-09

## 2024-02-08 RX ORDER — MEROPENEM 1 G/30ML
INJECTION INTRAVENOUS
Refills: 0 | Status: DISCONTINUED | OUTPATIENT
Start: 2024-02-08 | End: 2024-02-09

## 2024-02-08 RX ORDER — ACETAMINOPHEN 500 MG
1000 TABLET ORAL ONCE
Refills: 0 | Status: DISCONTINUED | OUTPATIENT
Start: 2024-02-08 | End: 2024-02-09

## 2024-02-08 RX ORDER — HEPARIN SODIUM 5000 [USP'U]/ML
5000 INJECTION INTRAVENOUS; SUBCUTANEOUS EVERY 8 HOURS
Refills: 0 | Status: DISCONTINUED | OUTPATIENT
Start: 2024-02-08 | End: 2024-02-09

## 2024-02-08 RX ORDER — NOREPINEPHRINE BITARTRATE/D5W 8 MG/250ML
0.05 PLASTIC BAG, INJECTION (ML) INTRAVENOUS
Qty: 8 | Refills: 0 | Status: DISCONTINUED | OUTPATIENT
Start: 2024-02-08 | End: 2024-02-09

## 2024-02-08 RX ORDER — MEROPENEM 1 G/30ML
1000 INJECTION INTRAVENOUS ONCE
Refills: 0 | Status: COMPLETED | OUTPATIENT
Start: 2024-02-08 | End: 2024-02-08

## 2024-02-08 RX ORDER — PANTOPRAZOLE SODIUM 20 MG/1
40 TABLET, DELAYED RELEASE ORAL DAILY
Refills: 0 | Status: DISCONTINUED | OUTPATIENT
Start: 2024-02-08 | End: 2024-02-09

## 2024-02-08 RX ORDER — GLUCAGON INJECTION, SOLUTION 0.5 MG/.1ML
1 INJECTION, SOLUTION SUBCUTANEOUS ONCE
Refills: 0 | Status: DISCONTINUED | OUTPATIENT
Start: 2024-02-08 | End: 2024-02-09

## 2024-02-08 RX ORDER — CHLORHEXIDINE GLUCONATE 213 G/1000ML
15 SOLUTION TOPICAL EVERY 12 HOURS
Refills: 0 | Status: DISCONTINUED | OUTPATIENT
Start: 2024-02-08 | End: 2024-02-09

## 2024-02-08 RX ADMIN — SODIUM CHLORIDE 125 MILLILITER(S): 9 INJECTION, SOLUTION INTRAVENOUS at 13:12

## 2024-02-08 RX ADMIN — CHLORHEXIDINE GLUCONATE 15 MILLILITER(S): 213 SOLUTION TOPICAL at 17:18

## 2024-02-08 RX ADMIN — Medication 250 MILLIGRAM(S): at 10:37

## 2024-02-08 RX ADMIN — SODIUM CHLORIDE 1000 MILLILITER(S): 9 INJECTION INTRAMUSCULAR; INTRAVENOUS; SUBCUTANEOUS at 10:37

## 2024-02-08 RX ADMIN — Medication 68.1 MICROGRAM(S)/KG/MIN: at 18:13

## 2024-02-08 RX ADMIN — Medication 68.1 MICROGRAM(S)/KG/MIN: at 09:29

## 2024-02-08 RX ADMIN — Medication 68.1 MICROGRAM(S)/KG/MIN: at 15:29

## 2024-02-08 RX ADMIN — SODIUM CHLORIDE 125 MILLILITER(S): 9 INJECTION, SOLUTION INTRAVENOUS at 21:29

## 2024-02-08 RX ADMIN — Medication 68.1 MICROGRAM(S)/KG/MIN: at 13:12

## 2024-02-08 RX ADMIN — Medication 7.98 MICROGRAM(S)/KG/MIN: at 21:05

## 2024-02-08 RX ADMIN — HEPARIN SODIUM 5000 UNIT(S): 5000 INJECTION INTRAVENOUS; SUBCUTANEOUS at 21:08

## 2024-02-08 RX ADMIN — Medication 50 MILLIGRAM(S): at 17:16

## 2024-02-08 RX ADMIN — Medication 400 MILLIGRAM(S): at 19:53

## 2024-02-08 RX ADMIN — MEROPENEM 100 MILLIGRAM(S): 1 INJECTION INTRAVENOUS at 21:06

## 2024-02-08 RX ADMIN — Medication 68.1 MICROGRAM(S)/KG/MIN: at 18:09

## 2024-02-08 NOTE — ED PROVIDER NOTE - DIFFERENTIAL DIAGNOSIS
Differential Diagnosis differential including but not limited to MI respiratory arrest aspiration electrolyte abnormality

## 2024-02-08 NOTE — H&P ADULT - NSHPLABSRESULTS_GEN_ALL_CORE
Labs:                          10.5   8.68  )-----------( 160      ( 08 Feb 2024 08:06 )             34.5       02-08    142  |  103  |  20  ----------------------------<  367<H>  3.9   |  22  |  1.24    Ca    8.2<L>      08 Feb 2024 08:06  Mg     2.6     02-08    TPro  5.7<L>  /  Alb  2.6<L>  /  TBili  0.2  /  DBili  x   /  AST  78<H>  /  ALT  42  /  AlkPhos  97  02-08          ABG - ( 08 Feb 2024 08:20 )  pH, Arterial: 6.95  pH, Blood: x     /  pCO2: 60    /  pO2: 57    / HCO3: 13    / Base Excess: -18.9 /  SaO2: 74.9                Urinalysis Basic - ( 08 Feb 2024 08:06 )    Color: x / Appearance: x / SG: x / pH: x  Gluc: 367 mg/dL / Ketone: x  / Bili: x / Urobili: x   Blood: x / Protein: x / Nitrite: x   Leuk Esterase: x / RBC: x / WBC x   Sq Epi: x / Non Sq Epi: x / Bacteria: x            Lactate Trend  02-08 @ 10:42 Lactate:7.5             CAPILLARY BLOOD GLUCOSE          EKG:   Personally Reviewed:  [ ] YES     Imaging:   Personally Reviewed:  [ ] YES

## 2024-02-08 NOTE — ED ADULT NURSE NOTE - NSFALLRISKINTERV_ED_ALL_ED
Communicate fall risk and risk factors to all staff, patient, and family/Provide patient with walking aids/Provide visual cue: yellow wristband, yellow gown, etc/Reinforce activity limits and safety measures with patient and family/Call bell, personal items and telephone in reach/Instruct patient to call for assistance before getting out of bed/chair/stretcher/Non-slip footwear applied when patient is off stretcher/[place] to call system/Physically safe environment - no spills, clutter or unnecessary equipment/Purposeful Proactive Rounding/Room/bathroom lighting operational, light cord in reach

## 2024-02-08 NOTE — CARE COORDINATION ASSESSMENT. - NSCAREPROVIDERS_GEN_ALL_CORE_FT
CARE PROVIDERS:  Accepting Physician: Mariano Horowitz  Access Services: Isiah Dias  Administration: Hermilo Phelps  Administration: Krys Swenson  Admitting: Mariano Horowitz  Attending: Mariano Horowitz  Clinical Doc. Improvement: Yris Llamas  Consultant: Jae Shrestha  Consultant: Pawan Cochran  Consultant: Sedrick Wang  Consultant: Rachid Romo  Covering Nurse: Cheyenne Alejo  ED Attending: Zaki Nelson  ED Nurse: Paul Champion  Nurse: Antonia Alejandre  Nurse: Paul Champion  Nurse: Rajni Liz  Nurse: Hawk White  Nurse: Cy Dodd  Nurse: Herber Zabala  Ordered: ServiceAccount, [facility]  Outpatient Provider: Roscoe Carrillo  Override: Rajni Liz  Primary Team: Mariano Horowitz  Primary Team: Chidi Ferreira  Respiratory Therapy: Miguel Abebe  Respiratory Therapy: Juana Gupta  Team: SOPHIA  Hospitalists, Team  UR// Supp. Assoc.: Juana Arias

## 2024-02-08 NOTE — CONSULT NOTE ADULT - SUBJECTIVE AND OBJECTIVE BOX
History of Present Illness: The patient is an 83 year old female with a history of HTN, HL, DM, hypothyroidism, throat cancer who presents with cardiac arrest. The patient is unable to provide additional history. As per family, she was eating crackers this morning and was noted to choke on it. Family tried to do Heimlich maneuver but patient became unresponsive. CPR was performed and ROSC was eventually achieved after about 30 minutes. She was given 2 epi and no shocks delivered.    Past Medical/Surgical History:  HTN, HL, DM, hypothyroidism, throat cancer    Medications:  Home Medications:  enalapril 5 mg oral tablet: 1 tab(s) orally once a day (18 Jun 2023 20:06)  Levemir 100 units/mL subcutaneous solution: 11 unit(s) subcutaneous once a day (19 Jun 2023 12:43)  rosuvastatin 5 mg oral tablet: 1 tab(s) orally once a day (18 Jun 2023 20:06)  Synthroid 100 mcg (0.1 mg) oral tablet: 1 tab(s) orally once a day (18 Jun 2023 20:06)      Family History: Non-contributory family history of premature cardiovascular atherosclerotic disease    Social History: No tobacco, alcohol or drug use    Review of Systems:  Unable to obtain    Physical Exam:  Vitals:        Vital Signs Last 24 Hrs  T(C): --  T(F): --  HR: 73 (08 Feb 2024 09:35) (64 - 82)  BP: 133/66 (08 Feb 2024 09:48) (54/34 - 133/66)  BP(mean): 84 (08 Feb 2024 08:46) (39 - 84)  RR: 18 (08 Feb 2024 09:35) (14 - 18)  SpO2: 82% (08 Feb 2024 09:35) (74% - 82%)  Parameters below as of 08 Feb 2024 09:35  Patient On (Oxygen Delivery Method): ventilator  General: Unresponsive  HEENT: Intubated  Neck: No JVD, no carotid bruit  Lungs: CTAB  CV: RRR, nl S1/S2, no M/R/G  Abdomen: S/NT/ND, +BS  Extremities: No LE edema, no cyanosis  Neuro: AAOx0  Skin: No rash    Labs:                        10.5   8.68  )-----------( 160      ( 08 Feb 2024 08:06 )             34.5     02-08    142  |  103  |  20  ----------------------------<  367<H>  3.9   |  22  |  1.24    Ca    8.2<L>      08 Feb 2024 08:06  Mg     2.6     02-08    TPro  5.7<L>  /  Alb  2.6<L>  /  TBili  0.2  /  DBili  x   /  AST  78<H>  /  ALT  42  /  AlkPhos  97  02-08            ECG/Telemetry: Probable sinus rhythm with AIVR

## 2024-02-08 NOTE — ED PROVIDER NOTE - CLINICAL SUMMARY MEDICAL DECISION MAKING FREE TEXT BOX
Patient brought in by EMS status post cardiac arrest.  Per EMS report patient was witnessed to choke while eating a cracker 911 was called at approximately 710 when BLS crew arrived approximately 720, the Police Department was already there performing CPR they continued BLS until paramedics arrived at approximately 730 began ACLS patient was in asystole, today intubated patient gave 2 epi and achieved ROSC at approximately 740.  Upon patient arrival and ER she was unresponsive intubated.  No further history available at that time.    Patient developed 2 episodes of bradycardia PEA arrests with + ROSC, family arrived, discussed goals of care, they are aware of likely poor outcome, do not want patient to be on life support long term, but if her her stops, they want 1 more round of ACLS

## 2024-02-08 NOTE — ED PROVIDER NOTE - PROGRESS NOTE DETAILS
Discussed with Dr. Wang (critical care attending) agrees with plan will follow patient.  Discussed with Dr. Horowitz (hospitalist attending) agrees with plan will see patient

## 2024-02-08 NOTE — CONSULT NOTE ADULT - ASSESSMENT
83F Throat CA (s/p surgery), HTN, HLD and DM2 choked and aspirated during breakfast this morning and went into cardiac Arrest.  EMS was activated by family members, CPR performed by police and EMS intubated in field.  She underwent 2 rounds of CPR with Epi in field followed by additional rounds of CPR after arrival to our hospital with 5 more rounds of Epi.  Patient did have ROSC and now on vasopressors.    resp failure  card arrest  resus - rib fx - ptx -   ventilated  DM  HTN  HLD  Dysphagia - Aspiration  Head and Neck Ca   83F Throat CA (s/p surgery), HTN, HLD and DM2 choked and aspirated during breakfast this morning and went into cardiac Arrest.  EMS was activated by family members, CPR performed by police and EMS intubated in field.  She underwent 2 rounds of CPR with Epi in field followed by additional rounds of CPR after arrival to our hospital with 5 more rounds of Epi.  Patient did have ROSC and now on vasopressors.    resp failure  card arrest  resus - rib fx - ptx -   ventilated  DM  HTN  HLD  Dysphagia - Aspiration  Head and Neck Ca    prognosis poor  goc discussion  pall eval  wean pressors as tolerated  rpt ABG  HOB elev  asp prec  oral hygiene  skin care  DM care  serial FS  lung protective vent support  cardio eval in progress  I and O  serial indices  serial imaging to monitor small ptx - as seen on CT  dvt p  emp ABX for poss aspiration event

## 2024-02-08 NOTE — CARE COORDINATION ASSESSMENT. - NSPASTMEDSURGHISTORY_GEN_ALL_CORE_FT
PAST MEDICAL & SURGICAL HISTORY:  Diabetes      Hypothyroidism      HTN (hypertension)      Cancer of neck      History of appendectomy      Elective surgery  s/p prolapse bladder repair

## 2024-02-08 NOTE — CARE COORDINATION ASSESSMENT. - CURRENT MENTAL STATUS/COGNITIVE FUNCTIONING
83F Throat CA (s/p surgery), HTN, HLD and DM2 admitted for Cardiac Arrest and now Intubated on Ventilator./unable to assess

## 2024-02-08 NOTE — ED ADULT TRIAGE NOTE - CHIEF COMPLAINT QUOTE
82 y/o female presents via stretcher, bibems as cardiac arrest notification. ROSC achieved on the field by EMS

## 2024-02-08 NOTE — PROVIDER CONTACT NOTE (EICU) - BACKGROUND
- Patient remains intubated and on norepinephrine.  - Initial ABG showed acidemia: pH 6.95, pCO2 60, Lactic acid 7.5.  - CT chest revealed airspace lung disease, likely aspiration, left rib fractures, small PTX, and endotracheal tube (ETT) in the right mainstem.  - CT head no acute process; chronic ischemic changes  - ETT was repositioned on follow-up x-ray but still appears to be at the yaz pointing to the right mainstem on the most recent CXR at 9:36 AM.

## 2024-02-08 NOTE — ED PROVIDER NOTE - FAMILY DETAILS FREE TEXT FOR MDM ADDL HISTORY OBTAINED FROM QUESTION
English
witnessed arrest after choking while eating food this morning. history of difficulty swallowing s/p neck surgery for mass. kelley radford

## 2024-02-08 NOTE — H&P ADULT - HISTORY OF PRESENT ILLNESS
83F Throat CA (s/p surgery), HTN, HLD and DM2 choked and aspirated during breakfast this morning and went into cardiac Arrest.  EMS was activated by family members, CPR performed by police and EMS intubated in field.  She underwent 2 rounds of CPR with Epi in field followed by additional rounds of CPR after arrival to our hospital with 5 more rounds of Epi.  Patient did have ROSC and now on vasopressors.  Most of the history obtained by family at bedside.     Patient has been having progressive worsening of her underlying dysphagia that resulted from her throat CA. She has been modifying her food at home and doing well overall.  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     Routine labs and imaging performed in ED showing severe acidosis on ABG, with images confirming multiple acute left sided rib fractures and some infiltrates in lungs.

## 2024-02-08 NOTE — PROVIDER CONTACT NOTE (EICU) - ASSESSMENT
Post cardiac arrest with hypotension, on peripheral pressors. Acidemia and hypercarbia on abg with e/o mechanical trauma and asp PNA on CT chest, requiring ICU post arrest management. unresponsive at this time will need 24hrs to eval for neurologic function vs anoxic injury

## 2024-02-08 NOTE — H&P ADULT - NSHPPHYSICALEXAM_GEN_ALL_CORE
PHYSICAL EXAM:  Vital Signs Last 24 Hrs  T(C): --  T(F): --  HR: 80 (08 Feb 2024 11:07) (64 - 82)  BP: 124/61 (08 Feb 2024 10:52) (54/34 - 133/66)  BP(mean): 84 (08 Feb 2024 08:46) (39 - 84)  RR: 16 (08 Feb 2024 10:52) (14 - 18)  SpO2: 86% (08 Feb 2024 11:07) (74% - 86%)    Parameters below as of 08 Feb 2024 10:52  Patient On (Oxygen Delivery Method): ventilator        GENERAL: Intubated and Sedated  HEAD:  Atraumatic, Normocephalic  ENMT: Charlie Mucous Membranes  NECK: Supple, No JVD, Normal thyroid  HEART: Regular rate and rhythm; No murmurs, rubs, or gallops  CHEST/LUNG: Clear to auscultation bilaterally; No rales, rhonchi, wheezing, or rubs  ABDOMEN: Soft, Nontender, Nondistended; Bowel sounds present  EXTREMITIES:  2+ Peripheral Pulses, No clubbing, cyanosis, or edema  SKIN: No rashes or lesions

## 2024-02-08 NOTE — CARE COORDINATION ASSESSMENT. - OTHER PERTINENT DISCHARGE PLANNING INFORMATION:
83F Throat CA (s/p surgery), HTN, HLD and DM2 admitted for Cardiac Arrest and now Intubated on Ventilator. Met patient at bedside.  Explained role of CM, verbalized understanding. Pt was made aware a CM will remain available through hospitalization.  Contact information given in discharge/ transitions resource folder.

## 2024-02-08 NOTE — PROGRESS NOTE ADULT - SUBJECTIVE AND OBJECTIVE BOX
Patient is a 83y old  Female who presents with a chief complaint of cardiac arrest (08 Feb 2024 11:38)    BRIEF HOSPITAL COURSE: ***    Events last 24 hours: ***    PAST MEDICAL & SURGICAL HISTORY:  Cancer of neck  HTN (hypertension)  Hypothyroidism  Diabetes  History of appendectomy  Elective surgery  s/p prolapse bladder repair    Review of Systems:  Unable to obtain. Intubated, s/p cardiac arrest.    Medications:  norepinephrine Infusion 0.5 MICROgram(s)/kG/Min IV Continuous <Continuous>  acetaminophen   IVPB .. 1000 milliGRAM(s) IV Intermittent once  acetaminophen   IVPB .. 1000 milliGRAM(s) IV Intermittent once  pantoprazole  Injectable 40 milliGRAM(s) IV Push daily  dextrose 50% Injectable 12.5 Gram(s) IV Push once  dextrose 50% Injectable 25 Gram(s) IV Push once  dextrose 50% Injectable 25 Gram(s) IV Push once  dextrose Oral Gel 15 Gram(s) Oral once PRN  glucagon  Injectable 1 milliGRAM(s) IntraMuscular once  insulin lispro (ADMELOG) corrective regimen sliding scale   SubCutaneous every 6 hours  dextrose 5%. 1000 milliLiter(s) IV Continuous <Continuous>  dextrose 5%. 1000 milliLiter(s) IV Continuous <Continuous>  lactated ringers. 1000 milliLiter(s) IV Continuous <Continuous>  chlorhexidine 0.12% Liquid 15 milliLiter(s) Oral Mucosa every 12 hours  chlorhexidine 2% Cloths 1 Application(s) Topical <User Schedule>    Mode: AC/ CMV (Assist Control/ Continuous Mandatory Ventilation)  RR (machine): 18  TV (machine): 450  FiO2: 100  PEEP: 5  ITime: 1  MAP: 9  PIP: 11    ICU Vital Signs Last 24 Hrs  T(C): 37.1 (08 Feb 2024 18:00), Max: 37.1 (08 Feb 2024 18:00)  T(F): 98.8 (08 Feb 2024 18:00), Max: 98.8 (08 Feb 2024 18:00)  HR: 97 (08 Feb 2024 18:30) (64 - 105)  BP: 100/67 (08 Feb 2024 18:30) (54/34 - 133/66)  BP(mean): 72 (08 Feb 2024 18:30) (39 - 87)  ABP: --  ABP(mean): --  RR: 28 (08 Feb 2024 18:30) (14 - 28)  SpO2: 100% (08 Feb 2024 18:30) (74% - 100%)    O2 Parameters below as of 08 Feb 2024 18:25  Patient On (Oxygen Delivery Method): ventilator    O2 Concentration (%): 100    ABG - ( 08 Feb 2024 15:03 )  pH, Arterial: 7.20  pH, Blood: x     /  pCO2: 55    /  pO2: 104   / HCO3: 22    / Base Excess: -6.5  /  SaO2: 99.1      I&O's Detail    08 Feb 2024 07:01  -  08 Feb 2024 19:25  --------------------------------------------------------  IN:    Lactated Ringers: 750 mL    Norepinephrine: 190 mL  Total IN: 940 mL    OUT:    Ureteral Catheter (mL): 360 mL  Total OUT: 360 mL    Total NET: 580 mL    LABS:                        10.5   8.68  )-----------( 160      ( 08 Feb 2024 08:06 )             34.5     02-08    142  |  103  |  20  ----------------------------<  367<H>  3.9   |  22  |  1.24    Ca    8.2<L>      08 Feb 2024 08:06  Mg     2.6     02-08    TPro  5.7<L>  /  Alb  2.6<L>  /  TBili  0.2  /  DBili  x   /  AST  78<H>  /  ALT  42  /  AlkPhos  97  02-08    CAPILLARY BLOOD GLUCOSE    Urinalysis Basic - ( 08 Feb 2024 08:06 )  Color: x / Appearance: x / SG: x / pH: x  Gluc: 367 mg/dL / Ketone: x  / Bili: x / Urobili: x   Blood: x / Protein: x / Nitrite: x   Leuk Esterase: x / RBC: x / WBC x   Sq Epi: x / Non Sq Epi: x / Bacteria: x    CULTURES:    Physical Examination:    General: Well appearing, lying in bed in NAD.      HEENT: Pupils equal, reactive to light. Symmetric. No scleral icterus or injection.    PULM: Clear to auscultation B/L. No wheezes, rales, or rhonchi apprecaited. No significant sputum production or increased respiratory effort.    NECK: Supple, no lymphadenopathy, trachea midline.    CVS: Regular rate and rhythm, no murmurs appreciated, +s1/s2.    ABD: Soft, nondistended, nontender, normoactive bowel sounds.    EXT: No edema, nontender.    SKIN: Warm and well perfused, no rashes noted.    NEURO: Alert, oriented, interactive, nonfocal.    RADIOLOGY:  < from: Xray Chest 1 View- PORTABLE-Routine (Xray Chest 1 View- PORTABLE-Routine .) (02.08.24 @ 14:47) >  ACC: [id number] EXAM:  XR CHEST PORTABLE ROUTINE 1V   ORDERED BY: NATHANAEL CHEUNG     PROCEDURE DATE:  02/08/2024      INTERPRETATION:  AP chest semierect on February 8, 2024 2:26 PM. Patient   had an endotracheal tube repositioning.    Heart magnified by technique.    There is a diffuse advanced right lung infiltrate shows slightly better   aeration than on earlier in the day.    Endotracheal tube has been withdrawn somewhat above the yaz to better   position.    The endotracheal tube is now 1 cm above the yaz.    There is a persistent small left apical pneumothorax. Several left rib   fractures are better seen on prior imaging.    IMPRESSION: Endotracheal tube withdrawn to better position and is now 1   cm above the yaz. Consider further withdrawal.    Persistent small left apical pneumothorax.    Diffuse right lung infiltrate shows better aeration.    --- End of Report ---    KIMBERLY WALKER MD; Attending Radiologist  This document has been electronically signed. Feb 8 2024  6:38PM    < end of copied text >    CRITICAL CARE TIME SPENT: 50 minutes    Date of entry of this note is equal to the date of services rendered.   Patient is a 83y old  Female who presents with a chief complaint of cardiac arrest (08 Feb 2024 11:38)    BRIEF HOSPITAL COURSE: 84 y/o F with PMHx of throat CA, dysphagia, HTN, HLD, and DM type 2 who presented to ER s/p cardiac arrest. Pt reportedly choked/aspirated while eating breakfast and subsequently went into cardiac arrest. ROSC achieved by EMS. Pt had a second cardiac arrest in the ER (total downtime ~35 mins). ABG significant for severe metabolic/respiratory acidosis. Imaging revealed small left pneumothorax, acute fractures left 6/7/8 ribs, and extensive b/l airspace disease. Pt was admitted to [facility] for continuation of care.    Events last 24 hours: Increasing levophed requirement throughout the day. Pt seen and examined at the bedside. She is overbreathing the ventilator. Pupils are nonreactive, no cough/gag, no withdrawal to pain. ABG repeated (7.22/51/117/21). Appropriate ventilator adjustments were made.    PAST MEDICAL & SURGICAL HISTORY:  Cancer of neck  HTN (hypertension)  Hypothyroidism  Diabetes  History of appendectomy  Elective surgery  s/p prolapse bladder repair    Review of Systems:  Unable to obtain. Intubated, s/p cardiac arrest.    Medications:  norepinephrine Infusion 0.5 MICROgram(s)/kG/Min IV Continuous <Continuous>  acetaminophen   IVPB .. 1000 milliGRAM(s) IV Intermittent once  acetaminophen   IVPB .. 1000 milliGRAM(s) IV Intermittent once  pantoprazole  Injectable 40 milliGRAM(s) IV Push daily  dextrose 50% Injectable 12.5 Gram(s) IV Push once  dextrose 50% Injectable 25 Gram(s) IV Push once  dextrose 50% Injectable 25 Gram(s) IV Push once  dextrose Oral Gel 15 Gram(s) Oral once PRN  glucagon  Injectable 1 milliGRAM(s) IntraMuscular once  insulin lispro (ADMELOG) corrective regimen sliding scale   SubCutaneous every 6 hours  dextrose 5%. 1000 milliLiter(s) IV Continuous <Continuous>  dextrose 5%. 1000 milliLiter(s) IV Continuous <Continuous>  lactated ringers. 1000 milliLiter(s) IV Continuous <Continuous>  chlorhexidine 0.12% Liquid 15 milliLiter(s) Oral Mucosa every 12 hours  chlorhexidine 2% Cloths 1 Application(s) Topical <User Schedule>    Mode: AC/ CMV (Assist Control/ Continuous Mandatory Ventilation)  RR (machine): 18  TV (machine): 450  FiO2: 100  PEEP: 5  ITime: 1  MAP: 9  PIP: 11    ICU Vital Signs Last 24 Hrs  T(C): 37.1 (08 Feb 2024 18:00), Max: 37.1 (08 Feb 2024 18:00)  T(F): 98.8 (08 Feb 2024 18:00), Max: 98.8 (08 Feb 2024 18:00)  HR: 97 (08 Feb 2024 18:30) (64 - 105)  BP: 100/67 (08 Feb 2024 18:30) (54/34 - 133/66)  BP(mean): 72 (08 Feb 2024 18:30) (39 - 87)  ABP: --  ABP(mean): --  RR: 28 (08 Feb 2024 18:30) (14 - 28)  SpO2: 100% (08 Feb 2024 18:30) (74% - 100%)    O2 Parameters below as of 08 Feb 2024 18:25  Patient On (Oxygen Delivery Method): ventilator    O2 Concentration (%): 100    ABG - ( 08 Feb 2024 15:03 )  pH, Arterial: 7.20  pH, Blood: x     /  pCO2: 55    /  pO2: 104   / HCO3: 22    / Base Excess: -6.5  /  SaO2: 99.1      I&O's Detail    08 Feb 2024 07:01  -  08 Feb 2024 19:25  --------------------------------------------------------  IN:    Lactated Ringers: 750 mL    Norepinephrine: 190 mL  Total IN: 940 mL    OUT:    Ureteral Catheter (mL): 360 mL  Total OUT: 360 mL    Total NET: 580 mL    LABS:                        10.5   8.68  )-----------( 160      ( 08 Feb 2024 08:06 )             34.5     02-08    142  |  103  |  20  ----------------------------<  367<H>  3.9   |  22  |  1.24    Ca    8.2<L>      08 Feb 2024 08:06  Mg     2.6     02-08    TPro  5.7<L>  /  Alb  2.6<L>  /  TBili  0.2  /  DBili  x   /  AST  78<H>  /  ALT  42  /  AlkPhos  97  02-08    CAPILLARY BLOOD GLUCOSE    Urinalysis Basic - ( 08 Feb 2024 08:06 )  Color: x / Appearance: x / SG: x / pH: x  Gluc: 367 mg/dL / Ketone: x  / Bili: x / Urobili: x   Blood: x / Protein: x / Nitrite: x   Leuk Esterase: x / RBC: x / WBC x   Sq Epi: x / Non Sq Epi: x / Bacteria: x    CULTURES:    Physical Examination:    General: Intubated.    HEENT: Pupils nonreactive.    PULM: Coarse breath sounds R > L.    NECK: Supple, no lymphadenopathy, trachea midline.    CVS: Regular rate and rhythm, no murmurs appreciated, +s1/s2.    ABD: Soft, nondistended, nontender, normoactive bowel sounds.    EXT: No edema, nontender.    SKIN: Warm and well perfused, no rashes noted.    NEURO: No sedation, cough/gag absent, no withdrawal to pain in any extremities.    RADIOLOGY:  < from: Xray Chest 1 View- PORTABLE-Routine (Xray Chest 1 View- PORTABLE-Routine .) (02.08.24 @ 14:47) >  ACC: [id number] EXAM:  XR CHEST PORTABLE ROUTINE 1V   ORDERED BY: NATHANAEL CHEUNG     PROCEDURE DATE:  02/08/2024      INTERPRETATION:  AP chest semierect on February 8, 2024 2:26 PM. Patient   had an endotracheal tube repositioning.    Heart magnified by technique.    There is a diffuse advanced right lung infiltrate shows slightly better   aeration than on earlier in the day.    Endotracheal tube has been withdrawn somewhat above the yaz to better   position.    The endotracheal tube is now 1 cm above the yaz.    There is a persistent small left apical pneumothorax. Several left rib   fractures are better seen on prior imaging.    IMPRESSION: Endotracheal tube withdrawn to better position and is now 1   cm above the yaz. Consider further withdrawal.    Persistent small left apical pneumothorax.    Diffuse right lung infiltrate shows better aeration.    --- End of Report ---    KIMBERLY WALKER MD; Attending Radiologist  This document has been electronically signed. Feb 8 2024  6:38PM    < end of copied text >    CRITICAL CARE TIME SPENT: 50 minutes    Date of entry of this note is equal to the date of services rendered.

## 2024-02-08 NOTE — H&P ADULT - ASSESSMENT
83F Throat CA (s/p surgery), HTN, HLD and DM2 admitted for Cardiac Arrest and now Intubated on Ventillator     Cardiac Arrest / Asphyxia   Due to an episode of aspiration and choking  Anticipate anoxic brain injury due to event but unclear how much at this time  Severe Acidosis and will repeat CXR to confirm   Lactate elevated; Will give IVF and trend  Echo ordered and will follow up     HTN / HLD  Hold    DM2  Appears BS are uncontrolled and elevated   WIll resume long acting inulin   Attempt to maintain BS < 180    Diet  NPO    DVT Prophylaxis  Heparin SC TID    Disposition   Prognosis is poor  Spoke to daughter and grandson; Family has agreed to no more CPR  They want to give 24 hours to see condition of patient and decide if they want to proceed  They are only interested in heroic efforts if quality of life is preserved  Discharge pending hospital course

## 2024-02-08 NOTE — ED PROVIDER NOTE - OBJECTIVE STATEMENT
Patient brought in by EMS status post cardiac arrest.  Per EMS report patient was witnessed to choke while eating a cracker 911 was called at approximately 710 when BLS crew arrived approximately 720, the Police Department was already there performing CPR they continued BLS until paramedics arrived at approximately 730 began ACLS patient was in asystole, today intubated patient gave 2 epi and achieved ROSC at approximately 740.  Upon patient arrival and ER she was unresponsive intubated.  No further history available at that time.

## 2024-02-08 NOTE — ED ADULT TRIAGE NOTE - PRO INTERPRETER NEED 2
Unknown Birth Control Pills Pregnancy And Lactation Text: This medication should be avoided if pregnant and for the first 30 days post-partum.

## 2024-02-08 NOTE — ED ADULT NURSE NOTE - OBJECTIVE STATEMENT
82 y/o received unresponsive as a cardiac arrest notification. bibems from home. per ems, pt had choked while eating a cracker, cpr initiated by county police at home, ACLS continued by ems upon arrival, 2 epinephrines given and intubated prior to ed arrival. pt achieved ROSC upon arrival, placed on cardiac monitor, IO access noted to left tibia by EMS. 2 IVL inserted in trauma room, IVF boluses initiated, shortly after, pt became bradycardic to 30 bpm, ACLS initiated in the ED, ROSC achieved once again, pt's pupils fixed, blood noted to ET tube. family members at bedside. see code sheet for details.

## 2024-02-08 NOTE — PROVIDER CONTACT NOTE (EICU) - RECOMMENDATIONS
- Maintain MAP> 60  - Titrate norepinephrine as needed to achieve target MAP.  - IV fluids with LR at 125ml/hr.  - Coronado catheter to be placed for monitoring urine output.  - Plan to withdraw ETT 1cm and follow up with repeat CXR  - Ventilator settings adjusted to increase respiratory rate from 14 to 18 for hypercarbia.  - Follow-up ABG in 30 minutes to assess response.  - Continue antibiotics  - Initiate DVT ppx and GI ppx  - poor prognosis and goals fo care primary team d/w family no further CPR FU further aggressive measures such as central line - Maintain MAP> 60  - Titrate norepinephrine as needed to achieve target MAP.  - IV fluids with LR at 125ml/hr.  - Coronado catheter to be placed for monitoring urine output.  - Plan to withdraw ETT 1cm and follow up with repeat CXR  - Ventilator settings adjusted to increase respiratory rate from 14 to 18 for hypercarbia.  - Follow-up ABG in 30 minutes to assess response.  - Continue antibiotics  - Initiate DVT ppx and GI ppx  - poor prognosis and goals of care, primary team d/w family no further CPR, FU further aggressive measures such as central line

## 2024-02-08 NOTE — PROVIDER CONTACT NOTE (EICU) - SITUATION
- 83-year-old female admitted to the ICU postcardiac arrest ~ 30mins ACLS before ROSC.  - Currently intubated and unresponsive.  - Initial BP on arrival to ICU was in the 70s systolic on 0.62 peripheral norepinephrine. BP titrated upwards with norepinephrine to maintain in the 130s systolic.

## 2024-02-08 NOTE — PROGRESS NOTE ADULT - ASSESSMENT
82 y/o F with PMHx of throat CA, dysphagia, HTN, HLD, and DM type 2 admitted with:    Cardiac arrest x 2  Acute hypoxic/hypercapnic respiratory failure  Shock  ERIC  Lactic acidosis  Transaminitis  Aspiration PNA  Small left PTX  Left rib fractures 6/7/8    PLAN:    -     I had an extensive goals of care discussion with pt's daughter and grandson via telephone. Pt is now DNR. See goals of care note for further details. 82 y/o F with PMHx of throat CA, dysphagia, HTN, HLD, and DM type 2 admitted with:    Cardiac arrest x 2  Acute hypoxic/hypercapnic respiratory failure  Shock  ERIC  Lactic acidosis  Transaminitis  Aspiration PNA  Small left PTX  Left rib fractures 6/7/8    PLAN:  - CT head negative for acute intracranial event.  - Not requiring any sedation.  - Maintain normothermia.  - Actively titrating levophed to maintain MAP > 65.  - Obtain TTE.  - Actively titrating ventilator settings to maintain SpO2 > 92% and adequate minute ventilation.  - Daily CXR to monitor small left PTX. No indication for emergent pigtail placement at this time.  - Renal function worsening.  - IVF running at 125cc/hr.  - Coronado in place. Not much urine output.  - Add abx to cover for aspiration PNA.  - Send MRSA PCR and sputum culture.  - Follow up blood cultures.  - NPO.  - Add GI prophylaxis with protonix.  - Add insulin sliding scale q6 hrs.  - Add chemical DVT prophylaxis with heparin.    Prognosis is poor given prolonged downtime. I had an extensive goals of care discussion with pt's daughter and grandson via telephone. Pt is now DNR. See goals of care note for further details.

## 2024-02-08 NOTE — CARE COORDINATION ASSESSMENT. - PRO ARRIVE FROM
DX: 83F Throat CA (s/p surgery), HTN, HLD and DM2 admitted for Cardiac Arrest and now Intubated on Ventilator.      CM called the daughters number who patient lives with at [phone] and cisco Zavala picked up the phone.  Cisco Zavala stated patient lives with him and his mom.  Grandalfonzo stated prior to patient coming to hospital was independent  and worked. Cisco stated has 2 small steps to enter in the house and then everything on one level.     CM verified:    PCP:  Grandson/ Family stated don't know.  Pharmacy: [facility] [phone].  Insurance: Medicare/ AARP.       Cisco Zavala stated to me they are playing waiting game to see how she does. Will continue to follow case./home

## 2024-02-08 NOTE — CONSULT NOTE ADULT - ASSESSMENT
The patient is an 83 year old female with a history of HTN, HL, DM, hypothyroidism, throat cancer who presents with cardiac arrest in the setting of acute respiratory failure.    Plan:  - ECG likely with sinus rhythm and AIVR with AV dissociation  - CT chest with malpositioned ET tube, bilateral infiltrates, small PTX, and acute rib fractures  - Hypotension may in part be due to acidosis  - Bedside echo performed by me; very technically limited but LV systolic function appears preserved  - Check full echo  - Continue norepinephrine and titrate to MAP of 65  - Continue IV fluids  - IV antibiotics  - Mechanical ventilation  - Remains hypoxic despite max vent settings  - Unclear neurologic status given significant down time  - ICU care  - Overall very poor prognosis    35 minutes of critical care time spent with the patient and coordinating care with nursing, hospitalist, and consultants. Patient is critically ill requiring ICU care. The patient is high risk for deterioration and death.

## 2024-02-08 NOTE — CARE COORDINATION ASSESSMENT. - NSDCPLANSERVICES_GEN_ALL_CORE
83F Throat CA (s/p surgery), HTN, HLD and DM2 admitted for Cardiac Arrest and now Intubated on Ventilator.          CM called the daughters number who patient lives with at [phone] and cisco Zavala picked up the phone.  Cisco Zavala stated patient lives with him and his mom.  Grandalfonzo stated prior to patient coming to hospital was independent  and worked. Cisco stated has 2 small steps to enter in the house and then everything on one level.     CM verified:    PCP:  Grandson/ Family stated don't know.  Pharmacy: [facility] [phone].  Insurance: Medicare/ AARP.       Cisco Zavala stated to me they are playing waiting game to see how she does. Will continue to follow case./Anticipated Needs Unclear at Present

## 2024-02-08 NOTE — GOALS OF CARE CONVERSATION - ADVANCED CARE PLANNING - CONVERSATION DETAILS
I had an extensive conversation with Zainab (daughter) and Lucas (grandson) via telephone regarding pt's current condition. I explained that given pt's prolonged downtime, one of the biggest concerns is that she has sustained significant anoxic injury. Pt has not been on any sedation since admission. She overbreathes the ventilator. Her pupils are not reactive, cough/gag absent, and she does not withdraw to pain. Pt's family is understanding that her prognosis is poor, and would not want her to undergo any further heroic measures as it would not have any benefit to her quality of life. If she should suffer another cardiac arrest, they asked that we allow her to pass peacefully and not perform CPR. We also discussed the fact that she is requiring vasopressor support and the potential need for a central line should this requirement continue to increase. Pt's family verbalized understanding of the risks associated of running vasopressors through a peripheral IV, but have asked that we defer central line insertion as they do not want to put pt through any invasive procedures given the fact that her prognosis is poor. They would like to wait 24 hrs to see if pt has any improvement in neurologic status, but if not, are open to discussing transition to comfort care.

## 2024-02-08 NOTE — PATIENT PROFILE ADULT - FUNCTIONAL ASSESSMENT - BASIC MOBILITY 6.
3-calculated by average/Not able to assess (calculate score using [facility] averaging method)

## 2024-02-08 NOTE — ED PROVIDER NOTE - ENMT, MLM
Called patient lvm regarding surgery arrival time   
Airway with Et tube in place. small blood around mouth. + neck mass

## 2024-02-08 NOTE — PATIENT PROFILE ADULT - FALL HARM RISK - RISK INTERVENTIONS
Communicate Fall Risk and Risk Factors to all staff, patient, and family/Bed in lowest position, wheels locked, appropriate side rails in place/Call bell, personal items and telephone in reach/Instruct patient to call for assistance before getting out of bed or chair/Non-slip footwear when patient is out of bed/[place] to call system/Physically safe environment - no spills, clutter or unnecessary equipment/Purposeful Proactive Rounding/Room/bathroom lighting operational, light cord in reach

## 2024-02-08 NOTE — CONSULT NOTE ADULT - SUBJECTIVE AND OBJECTIVE BOX
Date/Time Patient Seen:  		  Referring MD:   Data Reviewed	       Patient is a 83y old  Female who presents with a chief complaint of Cardiac Arrest (08 Feb 2024 11:12)      Subjective/HPI   83F Throat CA (s/p surgery), HTN, HLD and DM2 choked and aspirated during breakfast this morning and went into cardiac Arrest.  EMS was activated by family members, CPR performed by police and EMS intubated in field.  She underwent 2 rounds of CPR with Epi in field followed by additional rounds of CPR after arrival to our hospital with 5 more rounds of Epi.  Patient did have ROSC and now on vasopressors.  PAST MEDICAL & SURGICAL HISTORY:  Cancer of neck    HTN (hypertension)    Hypothyroidism    Diabetes    No significant past surgical history    History of appendectomy    Elective surgery  s/p prolapse bladder repair    PAST SURGICAL HISTORY:  Elective surgery s/p prolapse bladder repair    History of appendectomy.     FAMILY HISTORY:  Mother  Still living? Unknown  Family history of parkinsonism, Age at diagnosis: Age Unknown.     Social History:  · Substance use	Unable to obtain     Tobacco Screening:  · Core Measure Site	No    Risk Assessment:    Present on Admission:  Deep Venous Thrombosis	no  Pulmonary Embolus	no     HIV Screening:  · In accordance with [place] State law, we offer every patient who comes to our ED an HIV test. Would you like to be tested today?	Unable to answer due to medical condition/unresponsive/etc...        Medication list         MEDICATIONS  (STANDING):  acetaminophen   IVPB .. 1000 milliGRAM(s) IV Intermittent once  aztreonam  IVPB 1000 milliGRAM(s) IV Intermittent once  chlorhexidine 0.12% Liquid 15 milliLiter(s) Oral Mucosa every 12 hours  lactated ringers. 1000 milliLiter(s) (125 mL/Hr) IV Continuous <Continuous>  norepinephrine Infusion 0.5 MICROgram(s)/kG/Min (68.1 mL/Hr) IV Continuous <Continuous>    MEDICATIONS  (PRN):         Vitals log        ICU Vital Signs Last 24 Hrs  T(C): --  T(F): --  HR: 80 (08 Feb 2024 11:07) (64 - 82)  BP: 124/61 (08 Feb 2024 10:52) (54/34 - 133/66)  BP(mean): 84 (08 Feb 2024 08:46) (39 - 84)  ABP: --  ABP(mean): --  RR: 16 (08 Feb 2024 10:52) (14 - 18)  SpO2: 86% (08 Feb 2024 11:07) (74% - 86%)    O2 Parameters below as of 08 Feb 2024 10:52  Patient On (Oxygen Delivery Method): ventilator             Mode: AC/ CMV (Assist Control/ Continuous Mandatory Ventilation)  RR (machine): 14  TV (machine): 450  FiO2: 100  PEEP: 5  ITime: 1  MAP: 8  PIP: 18      Input and Output:  I&O's Detail      Lab Data                        10.5   8.68  )-----------( 160      ( 08 Feb 2024 08:06 )             34.5     02-08    142  |  103  |  20  ----------------------------<  367<H>  3.9   |  22  |  1.24    Ca    8.2<L>      08 Feb 2024 08:06  Mg     2.6     02-08    TPro  5.7<L>  /  Alb  2.6<L>  /  TBili  0.2  /  DBili  x   /  AST  78<H>  /  ALT  42  /  AlkPhos  97  02-08    ABG - ( 08 Feb 2024 08:20 )  pH, Arterial: 6.95  pH, Blood: x     /  pCO2: 60    /  pO2: 57    / HCO3: 13    / Base Excess: -18.9 /  SaO2: 74.9                    Review of Systems	  resp failure  card arrest      Objective     Physical Examination        Pertinent Lab findings & Imaging      [place]:  NO   Adequate UO     I&O's Detail           Discussed with:     Cultures:	        Radiology        ACC: [id number] EXAM:  CT CHEST   ORDERED BY: STEVE NELSON     PROCEDURE DATE:  02/08/2024          INTERPRETATION:  CLINICAL INFORMATION: Status post arrest, abnormal chest   x-ray    COMPARISON: CT chest 1/14/2022    CONTRAST/COMPLICATIONS:  IV Contrast: NONE  Oral Contrast: NONE  Complications: None reported at time of study completion    PROCEDURE:  CT of the Chest was performed.  Sagittal and coronal reformats were performed.    FINDINGS:    LUNGS AND AIRWAYS: Patent central airways. Endotracheal tube   malpositioned tip in the proximal right mainstem bronchus..  Extensive   bilateral airspace disease right greater than left and most pronounced in   the bilateral lower lobes. Correlate for pulmonary edema and/or   inflammation/infection..  PLEURA: Small left pneumothorax.  MEDIASTINUM AND ISAC: No lymphadenopathy. Nonspecific coarse   calcification right thyroid lobe. Dilated esophagus with fluid/debris   suggests gastroesophageal reflux  VESSELS: Nonaneurysmal.  HEART: Heart size is normal. No pericardial effusion.  CHEST WALL AND LOWER NECK: Within normal limits.  VISUALIZED UPPER ABDOMEN: Pneumobilia left hepatic lobe.  BONES: Acute displaced fractures left sixth, seventh and eighth lateral   ribs. Degenerative changes.    IMPRESSION:  Malpositioned ET tube tip in the right mainstem bronchus.  Small left pneumothorax.  Acute fractures left sixth seventh and eighth ribs.  Extensive bilateral airspace disease as described.  Additional findings as discussed.    Critical findings discussed with Dr. Nelson prior to this dictation      --- End of Report ---            CHAVO ENRIQUEZ MD; Attending Radiologist  This document has been electronically signed. Feb 8 2024 10:02AM                       Date/Time Patient Seen:  		  Referring MD:   Data Reviewed	       Patient is a 83y old  Female who presents with a chief complaint of Cardiac Arrest (08 Feb 2024 11:12)      Subjective/HPI   83F Throat CA (s/p surgery), HTN, HLD and DM2 choked and aspirated during breakfast this morning and went into cardiac Arrest.  EMS was activated by family members, CPR performed by police and EMS intubated in field.  She underwent 2 rounds of CPR with Epi in field followed by additional rounds of CPR after arrival to our hospital with 5 more rounds of Epi.  Patient did have ROSC and now on vasopressors.  PAST MEDICAL & SURGICAL HISTORY:  Cancer of neck    HTN (hypertension)    Hypothyroidism    Diabetes    No significant past surgical history    History of appendectomy    Elective surgery  s/p prolapse bladder repair    PAST SURGICAL HISTORY:  Elective surgery s/p prolapse bladder repair    History of appendectomy.     FAMILY HISTORY:  Mother  Still living? Unknown  Family history of parkinsonism, Age at diagnosis: Age Unknown.     Social History:  · Substance use	Unable to obtain     Tobacco Screening:  · Core Measure Site	No    Risk Assessment:    Present on Admission:  Deep Venous Thrombosis	no  Pulmonary Embolus	no     HIV Screening:  · In accordance with [place] State law, we offer every patient who comes to our ED an HIV test. Would you like to be tested today?	Unable to answer due to medical condition/unresponsive/etc...        Medication list         MEDICATIONS  (STANDING):  acetaminophen   IVPB .. 1000 milliGRAM(s) IV Intermittent once  aztreonam  IVPB 1000 milliGRAM(s) IV Intermittent once  chlorhexidine 0.12% Liquid 15 milliLiter(s) Oral Mucosa every 12 hours  lactated ringers. 1000 milliLiter(s) (125 mL/Hr) IV Continuous <Continuous>  norepinephrine Infusion 0.5 MICROgram(s)/kG/Min (68.1 mL/Hr) IV Continuous <Continuous>    MEDICATIONS  (PRN):         Vitals log        ICU Vital Signs Last 24 Hrs  T(C): --  T(F): --  HR: 80 (08 Feb 2024 11:07) (64 - 82)  BP: 124/61 (08 Feb 2024 10:52) (54/34 - 133/66)  BP(mean): 84 (08 Feb 2024 08:46) (39 - 84)  ABP: --  ABP(mean): --  RR: 16 (08 Feb 2024 10:52) (14 - 18)  SpO2: 86% (08 Feb 2024 11:07) (74% - 86%)    O2 Parameters below as of 08 Feb 2024 10:52  Patient On (Oxygen Delivery Method): ventilator             Mode: AC/ CMV (Assist Control/ Continuous Mandatory Ventilation)  RR (machine): 14  TV (machine): 450  FiO2: 100  PEEP: 5  ITime: 1  MAP: 8  PIP: 18      Input and Output:  I&O's Detail      Lab Data                        10.5   8.68  )-----------( 160      ( 08 Feb 2024 08:06 )             34.5     02-08    142  |  103  |  20  ----------------------------<  367<H>  3.9   |  22  |  1.24    Ca    8.2<L>      08 Feb 2024 08:06  Mg     2.6     02-08    TPro  5.7<L>  /  Alb  2.6<L>  /  TBili  0.2  /  DBili  x   /  AST  78<H>  /  ALT  42  /  AlkPhos  97  02-08    ABG - ( 08 Feb 2024 08:20 )  pH, Arterial: 6.95  pH, Blood: x     /  pCO2: 60    /  pO2: 57    / HCO3: 13    / Base Excess: -18.9 /  SaO2: 74.9                    Review of Systems	  resp failure  card arrest      Objective     Physical Examination    heart s1s2  lung dec bS  head nc  et tube      Pertinent Lab findings & Imaging      [place]:  NO   Adequate UO     I&O's Detail           Discussed with:     Cultures:	        Radiology        ACC: [id number] EXAM:  CT CHEST   ORDERED BY: STEVE NELSON     PROCEDURE DATE:  02/08/2024          INTERPRETATION:  CLINICAL INFORMATION: Status post arrest, abnormal chest   x-ray    COMPARISON: CT chest 1/14/2022    CONTRAST/COMPLICATIONS:  IV Contrast: NONE  Oral Contrast: NONE  Complications: None reported at time of study completion    PROCEDURE:  CT of the Chest was performed.  Sagittal and coronal reformats were performed.    FINDINGS:    LUNGS AND AIRWAYS: Patent central airways. Endotracheal tube   malpositioned tip in the proximal right mainstem bronchus..  Extensive   bilateral airspace disease right greater than left and most pronounced in   the bilateral lower lobes. Correlate for pulmonary edema and/or   inflammation/infection..  PLEURA: Small left pneumothorax.  MEDIASTINUM AND ISAC: No lymphadenopathy. Nonspecific coarse   calcification right thyroid lobe. Dilated esophagus with fluid/debris   suggests gastroesophageal reflux  VESSELS: Nonaneurysmal.  HEART: Heart size is normal. No pericardial effusion.  CHEST WALL AND LOWER NECK: Within normal limits.  VISUALIZED UPPER ABDOMEN: Pneumobilia left hepatic lobe.  BONES: Acute displaced fractures left sixth, seventh and eighth lateral   ribs. Degenerative changes.    IMPRESSION:  Malpositioned ET tube tip in the right mainstem bronchus.  Small left pneumothorax.  Acute fractures left sixth seventh and eighth ribs.  Extensive bilateral airspace disease as described.  Additional findings as discussed.    Critical findings discussed with Dr. Nelson prior to this dictation      --- End of Report ---            CHAVO ENRIQUEZ MD; Attending Radiologist  This document has been electronically signed. Feb 8 2024 10:02AM

## 2024-02-09 DIAGNOSIS — Z71.89 OTHER SPECIFIED COUNSELING: ICD-10-CM

## 2024-02-09 DIAGNOSIS — I46.9 CARDIAC ARREST, CAUSE UNSPECIFIED: ICD-10-CM

## 2024-02-09 DIAGNOSIS — G93.1 ANOXIC BRAIN DAMAGE, NOT ELSEWHERE CLASSIFIED: ICD-10-CM

## 2024-02-09 DIAGNOSIS — Z51.5 ENCOUNTER FOR PALLIATIVE CARE: ICD-10-CM

## 2024-02-09 LAB
A1C WITH ESTIMATED AVERAGE GLUCOSE RESULT: 8.2 % — HIGH (ref 4–5.6)
ALBUMIN SERPL ELPH-MCNC: 2.5 G/DL — LOW (ref 3.3–5)
ALP SERPL-CCNC: 109 U/L — SIGNIFICANT CHANGE UP (ref 30–120)
ALT FLD-CCNC: 89 U/L — HIGH (ref 10–60)
ANION GAP SERPL CALC-SCNC: 14 MMOL/L — SIGNIFICANT CHANGE UP (ref 5–17)
AST SERPL-CCNC: 181 U/L — HIGH (ref 10–40)
BASE EXCESS BLDA CALC-SCNC: -5.7 MMOL/L — LOW (ref -2–3)
BASOPHILS # BLD AUTO: 0.05 K/UL — SIGNIFICANT CHANGE UP (ref 0–0.2)
BASOPHILS NFR BLD AUTO: 0.2 % — SIGNIFICANT CHANGE UP (ref 0–2)
BILIRUB SERPL-MCNC: 0.5 MG/DL — SIGNIFICANT CHANGE UP (ref 0.2–1.2)
BUN SERPL-MCNC: 39 MG/DL — HIGH (ref 7–23)
CALCIUM SERPL-MCNC: 7.7 MG/DL — LOW (ref 8.4–10.5)
CHLORIDE SERPL-SCNC: 108 MMOL/L — SIGNIFICANT CHANGE UP (ref 96–108)
CO2 SERPL-SCNC: 22 MMOL/L — SIGNIFICANT CHANGE UP (ref 22–31)
CREAT SERPL-MCNC: 2.01 MG/DL — HIGH (ref 0.5–1.3)
EGFR: 24 ML/MIN/1.73M2 — LOW
EOSINOPHIL # BLD AUTO: 0.04 K/UL — SIGNIFICANT CHANGE UP (ref 0–0.5)
EOSINOPHIL NFR BLD AUTO: 0.2 % — SIGNIFICANT CHANGE UP (ref 0–6)
ESTIMATED AVERAGE GLUCOSE: 189 MG/DL — HIGH (ref 68–114)
GLUCOSE BLDC GLUCOMTR-MCNC: 205 MG/DL — HIGH (ref 70–99)
GLUCOSE BLDC GLUCOMTR-MCNC: 247 MG/DL — HIGH (ref 70–99)
GLUCOSE BLDC GLUCOMTR-MCNC: 83 MG/DL — SIGNIFICANT CHANGE UP (ref 70–99)
GLUCOSE SERPL-MCNC: 222 MG/DL — HIGH (ref 70–99)
GRAM STN FLD: ABNORMAL
HCO3 BLDA-SCNC: 21 MMOL/L — SIGNIFICANT CHANGE UP (ref 21–28)
HCT VFR BLD CALC: 40.7 % — SIGNIFICANT CHANGE UP (ref 34.5–45)
HGB BLD-MCNC: 12.2 G/DL — SIGNIFICANT CHANGE UP (ref 11.5–15.5)
HOROWITZ INDEX BLDA+IHG-RTO: 80 — SIGNIFICANT CHANGE UP
IMM GRANULOCYTES NFR BLD AUTO: 0.6 % — SIGNIFICANT CHANGE UP (ref 0–0.9)
LYMPHOCYTES # BLD AUTO: 1.69 K/UL — SIGNIFICANT CHANGE UP (ref 1–3.3)
LYMPHOCYTES # BLD AUTO: 7.9 % — LOW (ref 13–44)
MAGNESIUM SERPL-MCNC: 1.2 MG/DL — LOW (ref 1.6–2.6)
MCHC RBC-ENTMCNC: 28 PG — SIGNIFICANT CHANGE UP (ref 27–34)
MCHC RBC-ENTMCNC: 30 GM/DL — LOW (ref 32–36)
MCV RBC AUTO: 93.6 FL — SIGNIFICANT CHANGE UP (ref 80–100)
MONOCYTES # BLD AUTO: 0.76 K/UL — SIGNIFICANT CHANGE UP (ref 0–0.9)
MONOCYTES NFR BLD AUTO: 3.6 % — SIGNIFICANT CHANGE UP (ref 2–14)
MRSA PCR RESULT.: SIGNIFICANT CHANGE UP
NEUTROPHILS # BLD AUTO: 18.69 K/UL — HIGH (ref 1.8–7.4)
NEUTROPHILS NFR BLD AUTO: 87.5 % — HIGH (ref 43–77)
NRBC # BLD: 0 /100 WBCS — SIGNIFICANT CHANGE UP (ref 0–0)
PCO2 BLDA: 41 MMHG — HIGH (ref 32–35)
PH BLDA: 7.31 — LOW (ref 7.35–7.45)
PHOSPHATE SERPL-MCNC: 4.4 MG/DL — SIGNIFICANT CHANGE UP (ref 2.5–4.5)
PLATELET # BLD AUTO: 161 K/UL — SIGNIFICANT CHANGE UP (ref 150–400)
PO2 BLDA: 177 MMHG — HIGH (ref 83–108)
POTASSIUM SERPL-MCNC: 5.3 MMOL/L — SIGNIFICANT CHANGE UP (ref 3.5–5.3)
POTASSIUM SERPL-SCNC: 5.3 MMOL/L — SIGNIFICANT CHANGE UP (ref 3.5–5.3)
PROT SERPL-MCNC: 5.2 G/DL — LOW (ref 6–8.3)
RBC # BLD: 4.35 M/UL — SIGNIFICANT CHANGE UP (ref 3.8–5.2)
RBC # FLD: 13.5 % — SIGNIFICANT CHANGE UP (ref 10.3–14.5)
S AUREUS DNA NOSE QL NAA+PROBE: SIGNIFICANT CHANGE UP
SAO2 % BLDA: 98.7 % — HIGH (ref 94–98)
SODIUM SERPL-SCNC: 144 MMOL/L — SIGNIFICANT CHANGE UP (ref 135–145)
SPECIMEN SOURCE: SIGNIFICANT CHANGE UP
WBC # BLD: 21.35 K/UL — HIGH (ref 3.8–10.5)
WBC # FLD AUTO: 21.35 K/UL — HIGH (ref 3.8–10.5)

## 2024-02-09 PROCEDURE — 36600 WITHDRAWAL OF ARTERIAL BLOOD: CPT

## 2024-02-09 PROCEDURE — 87040 BLOOD CULTURE FOR BACTERIA: CPT

## 2024-02-09 PROCEDURE — 85027 COMPLETE CBC AUTOMATED: CPT

## 2024-02-09 PROCEDURE — 82803 BLOOD GASES ANY COMBINATION: CPT

## 2024-02-09 PROCEDURE — 71045 X-RAY EXAM CHEST 1 VIEW: CPT

## 2024-02-09 PROCEDURE — 70450 CT HEAD/BRAIN W/O DYE: CPT | Mod: MA

## 2024-02-09 PROCEDURE — 99223 1ST HOSP IP/OBS HIGH 75: CPT

## 2024-02-09 PROCEDURE — 83735 ASSAY OF MAGNESIUM: CPT

## 2024-02-09 PROCEDURE — 87641 MR-STAPH DNA AMP PROBE: CPT

## 2024-02-09 PROCEDURE — 36415 COLL VENOUS BLD VENIPUNCTURE: CPT

## 2024-02-09 PROCEDURE — 83605 ASSAY OF LACTIC ACID: CPT

## 2024-02-09 PROCEDURE — 94799 UNLISTED PULMONARY SVC/PX: CPT

## 2024-02-09 PROCEDURE — 84484 ASSAY OF TROPONIN QUANT: CPT

## 2024-02-09 PROCEDURE — 93005 ELECTROCARDIOGRAM TRACING: CPT

## 2024-02-09 PROCEDURE — 82962 GLUCOSE BLOOD TEST: CPT

## 2024-02-09 PROCEDURE — 84100 ASSAY OF PHOSPHORUS: CPT

## 2024-02-09 PROCEDURE — 99292 CRITICAL CARE ADDL 30 MIN: CPT

## 2024-02-09 PROCEDURE — 93306 TTE W/DOPPLER COMPLETE: CPT

## 2024-02-09 PROCEDURE — 94003 VENT MGMT INPAT SUBQ DAY: CPT

## 2024-02-09 PROCEDURE — 80053 COMPREHEN METABOLIC PANEL: CPT

## 2024-02-09 PROCEDURE — 99291 CRITICAL CARE FIRST HOUR: CPT

## 2024-02-09 PROCEDURE — 99239 HOSP IP/OBS DSCHRG MGMT >30: CPT

## 2024-02-09 PROCEDURE — 71250 CT THORAX DX C-: CPT | Mod: MA

## 2024-02-09 PROCEDURE — 94002 VENT MGMT INPAT INIT DAY: CPT

## 2024-02-09 PROCEDURE — 87070 CULTURE OTHR SPECIMN AEROBIC: CPT

## 2024-02-09 PROCEDURE — 87640 STAPH A DNA AMP PROBE: CPT

## 2024-02-09 PROCEDURE — 83036 HEMOGLOBIN GLYCOSYLATED A1C: CPT

## 2024-02-09 PROCEDURE — 85025 COMPLETE CBC W/AUTO DIFF WBC: CPT

## 2024-02-09 RX ORDER — HYDROMORPHONE HYDROCHLORIDE 2 MG/ML
1 INJECTION INTRAMUSCULAR; INTRAVENOUS; SUBCUTANEOUS EVERY 4 HOURS
Refills: 0 | Status: DISCONTINUED | OUTPATIENT
Start: 2024-02-09 | End: 2024-02-09

## 2024-02-09 RX ORDER — HYDROMORPHONE HYDROCHLORIDE 2 MG/ML
1 INJECTION INTRAMUSCULAR; INTRAVENOUS; SUBCUTANEOUS
Refills: 0 | Status: DISCONTINUED | OUTPATIENT
Start: 2024-02-09 | End: 2024-02-09

## 2024-02-09 RX ORDER — HYDROMORPHONE HYDROCHLORIDE 2 MG/ML
1 INJECTION INTRAMUSCULAR; INTRAVENOUS; SUBCUTANEOUS ONCE
Refills: 0 | Status: DISCONTINUED | OUTPATIENT
Start: 2024-02-09 | End: 2024-02-09

## 2024-02-09 RX ORDER — ROBINUL 0.2 MG/ML
0.2 INJECTION INTRAMUSCULAR; INTRAVENOUS ONCE
Refills: 0 | Status: COMPLETED | OUTPATIENT
Start: 2024-02-09 | End: 2024-02-09

## 2024-02-09 RX ORDER — ROBINUL 0.2 MG/ML
0.2 INJECTION INTRAMUSCULAR; INTRAVENOUS EVERY 6 HOURS
Refills: 0 | Status: DISCONTINUED | OUTPATIENT
Start: 2024-02-09 | End: 2024-02-09

## 2024-02-09 RX ORDER — HYDROMORPHONE HYDROCHLORIDE 2 MG/ML
2 INJECTION INTRAMUSCULAR; INTRAVENOUS; SUBCUTANEOUS
Refills: 0 | Status: DISCONTINUED | OUTPATIENT
Start: 2024-02-09 | End: 2024-02-09

## 2024-02-09 RX ADMIN — PANTOPRAZOLE SODIUM 40 MILLIGRAM(S): 20 TABLET, DELAYED RELEASE ORAL at 12:32

## 2024-02-09 RX ADMIN — ROBINUL 0.2 MILLIGRAM(S): 0.2 INJECTION INTRAMUSCULAR; INTRAVENOUS at 13:40

## 2024-02-09 RX ADMIN — Medication 7.98 MICROGRAM(S)/KG/MIN: at 08:18

## 2024-02-09 RX ADMIN — HEPARIN SODIUM 5000 UNIT(S): 5000 INJECTION INTRAVENOUS; SUBCUTANEOUS at 05:34

## 2024-02-09 RX ADMIN — Medication 4: at 05:29

## 2024-02-09 RX ADMIN — MEROPENEM 100 MILLIGRAM(S): 1 INJECTION INTRAVENOUS at 05:28

## 2024-02-09 RX ADMIN — CHLORHEXIDINE GLUCONATE 1 APPLICATION(S): 213 SOLUTION TOPICAL at 05:27

## 2024-02-09 RX ADMIN — Medication 7.98 MICROGRAM(S)/KG/MIN: at 04:04

## 2024-02-09 RX ADMIN — HYDROMORPHONE HYDROCHLORIDE 1 MILLIGRAM(S): 2 INJECTION INTRAMUSCULAR; INTRAVENOUS; SUBCUTANEOUS at 13:40

## 2024-02-09 RX ADMIN — CHLORHEXIDINE GLUCONATE 15 MILLILITER(S): 213 SOLUTION TOPICAL at 05:27

## 2024-02-09 RX ADMIN — SODIUM CHLORIDE 125 MILLILITER(S): 9 INJECTION, SOLUTION INTRAVENOUS at 04:52

## 2024-02-09 RX ADMIN — Medication 1 MILLIGRAM(S): at 13:40

## 2024-02-09 RX ADMIN — Medication 4: at 01:11

## 2024-02-09 RX ADMIN — Medication 7.98 MICROGRAM(S)/KG/MIN: at 12:57

## 2024-02-09 NOTE — DIETITIAN INITIAL EVALUATION ADULT - OTHER INFO
Pt discussed at team rounds. Visited patient in room, pt unable to answer questions due to intubation/sedation. NPO and terminal extubation in progress s/p cardiac arrests. Pertinent labs/meds reviewed: receiving LR, insulin lispro SS q6hr. Education not appropriate at this time. CMO at this time. RD to remain available per protocol.

## 2024-02-09 NOTE — DIETITIAN INITIAL EVALUATION ADULT - REASON FOR ADMISSION
Cardiac arrest    Per HPI: 83F Throat CA (s/p surgery), HTN, HLD and DM2 choked and aspirated during breakfast this morning and went into cardiac Arrest.  EMS was activated by family members, CPR performed by police and EMS intubated in field.  She underwent 2 rounds of CPR with Epi in field followed by additional rounds of CPR after arrival to our hospital with 5 more rounds of Epi.  Patient did have ROSC and now on vasopressors.  Most of the history obtained by family at bedside.   Patient has been having progressive worsening of her underlying dysphagia that resulted from her throat CA. She has been modifying her food at home and doing well overall.  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   Routine labs and imaging performed in ED showing severe acidosis on ABG, with images confirming multiple acute left sided rib fractures and some infiltrates in lungs.  (08 Feb 2024 11:12)

## 2024-02-09 NOTE — DIETITIAN INITIAL EVALUATION ADULT - ORAL INTAKE PTA/DIET HISTORY
Unable to obtain diet/weight hx from pt due to intubation/sedation. Known hx of worsening dysphagia from EMR.

## 2024-02-09 NOTE — PROGRESS NOTE ADULT - ASSESSMENT
83F Throat CA (s/p surgery), HTN, HLD and DM2 admitted for Cardiac Arrest and now Intubated on Ventillator     Cardiac Arrest / Asphyxia   Due to an episode of aspiration and choking  Anticipate anoxic brain injury due to event but unclear how much at this time  Patient is over-breathing the vent; Pupils are reactive; Neurology consult obtained and not brain dead  Acidosis improving;  Renal failure worsening  Echo with Global Hypokinesis    HTN / HLD  Hold    DM2  Appears BS are uncontrolled and elevated     Diet  NPO    DVT Prophylaxis  Heparin SC TID    Disposition   Prognosis is poor  Spoke to daughter and grandson; Family has agreed to no more CPR  They are considering a terminal wean later today; Requested support from our Hospice physician

## 2024-02-09 NOTE — CONSULT NOTE ADULT - SUBJECTIVE AND OBJECTIVE BOX
HPI:  83F Throat CA (s/p surgery), HTN, HLD and DM2 choked and aspirated during breakfast this morning and went into cardiac Arrest.  EMS was activated by family members, CPR performed by police and EMS intubated in field.  She underwent 2 rounds of CPR with Epi in field followed by additional rounds of CPR after arrival to our hospital with 5 more rounds of Epi.  Patient did have ROSC and now on vasopressors.  Most of the history obtained by family at bedside.     Patient has been having progressive worsening of her underlying dysphagia that resulted from her throat CA. She has been modifying her food at home and doing well overall.  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     Routine labs and imaging performed in ED showing severe acidosis on ABG, with images confirming multiple acute left sided rib fractures and some infiltrates in lungs.  (08 Feb 2024 11:12)    PERTINENT PM/SXH:   Cancer of neck    HTN (hypertension)    Hypothyroidism    Diabetes      No significant past surgical history    History of appendectomy    Elective surgery      FAMILY HISTORY:  Family history of parkinsonism (Mother)      Family Hx substance abuse [ ]yes [ ]no  ITEMS NOT CHECKED ARE NOT PRESENT    SOCIAL HISTORY:   Significant other/partner[ ]  Children[ ]  Mandaeism/Spirituality:  Substance hx:  [ ]   Tobacco hx:  [ ]   Alcohol hx: [ ]   Home Opioid hx:  [ ] I-Stop Reference No:  Living Situation: [ ]Home  [ ]Long term care  [ ]Rehab [ ]Other    ADVANCE DIRECTIVES:    DNR/MOLST  [ ]  Living Will  [ ]   DECISION MAKER(s):  [ ] Health Care Proxy(s)  [ ] Surrogate(s)  [ ] Guardian           Name(s): Phone Number(s):    BASELINE (I)ADL(s) (prior to admission):  [place]: [ ]Total  [ ] Moderate [ ]Dependent    Allergies    shellfish (Unknown)  penicillin (Unknown)    Intolerances    MEDICATIONS  (STANDING):  HYDROmorphone  Injectable 1 milliGRAM(s) IV Push every 4 hours  LORazepam   Injectable 1 milliGRAM(s) IV Push every 4 hours    MEDICATIONS  (PRN):  glycopyrrolate Injectable 0.2 milliGRAM(s) IV Push every 6 hours PRN secretions  HYDROmorphone  Injectable 1 milliGRAM(s) IV Push every 1 hour PRN Moderate Pain (4 - 6)  HYDROmorphone  Injectable 2 milliGRAM(s) IV Push every 1 hour PRN dyspnea  HYDROmorphone  Injectable 2 milliGRAM(s) IV Push every 1 hour PRN Severe Pain (7 - 10)  LORazepam   Injectable 1 milliGRAM(s) IV Push every 1 hour PRN anxiety, agitation, refractory dyspnea    PRESENT SYMPTOMS: [ ]Unable to self-report  [ ] CPOT [ ] PAINADs [ ] RDOS  Source if other than patient:  [ ]Family   [ ]Team     Pain: [ ]yes [ ]no  QOL impact -   Location -                    Aggravating factors -  Quality -  Radiation -  Timing-  Severity (0-10 scale):  Minimal acceptable level (0-10 scale):     CPOT:    https://www.[facility].org/getattachment/[email or web address]/Critical-Care-Pain-Observation-Tool-(CPOT)    PAIN AD Score:   http://geriatrictoolkit.[facility]/cog/painad.pdf (press ctrl +  left click to view)    Dyspnea:                           [ ]Mild [ ]Moderate [ ]Severe      RDOS:  0 to 2  minimal or no respiratory distress   3  mild distress  4 to 6 moderate distress  >7 severe distress  https://homecareinformation.net/handouts/hen/Respiratory_Distress_Observation_Scale.pdf (Ctrl +  left click to view)     Anxiety:                             [ ]Mild [ ]Moderate [ ]Severe  Fatigue:                             [ ]Mild [ ]Moderate [ ]Severe  Nausea:                             [ ]Mild [ ]Moderate [ ]Severe  Loss of appetite:              [ ]Mild [ ]Moderate [ ]Severe  Constipation:                    [ ]Mild [ ]Moderate [ ]Severe    PCSSQ[Palliative Care Spiritual Screening Question]   Severity (0-10):  Score of 4 or > indicate consideration of Chaplaincy referral.  Chaplaincy Referral: [ ] yes [ ] refused [ ] following [ ] Deferred     Caregiver [place]? : [ ] yes [ ] no [ ] Deferred [ ] Declined             Social work referral [ ] Patient & Family Centered Care Referral [ ]     Anticipatory Grief present?:  [ ] yes [ ] no  [ ] Deferred                  Social work referral [ ] Chaplaincy Referral[ ]      Other Symptoms:  [ ]All other review of systems negative     Palliative Performance Status Version 2:         %    http://npcrc.org/files/news/palliative_performance_scale_ppsv2.pdf  PHYSICAL EXAM:  Vital Signs Last 24 Hrs  T(C): 36.8 (09 Feb 2024 00:00), Max: 38 (08 Feb 2024 19:30)  T(F): 98.3 (09 Feb 2024 00:00), Max: 100.4 (08 Feb 2024 19:30)  HR: 76 (09 Feb 2024 12:26) (72 - 105)  BP: 128/40 (09 Feb 2024 10:00) (83/41 - 209/198)  BP(mean): 62 (09 Feb 2024 10:00) (44 - 203)  RR: 29 (09 Feb 2024 10:00) (17 - 42)  SpO2: 100% (09 Feb 2024 12:26) (96% - 100%)    Parameters below as of 09 Feb 2024 05:00      O2 Concentration (%): 80 I&O's Summary    08 Feb 2024 07:01  -  09 Feb 2024 07:00  --------------------------------------------------------  IN: 3284 mL / OUT: 460 mL / NET: 2824 mL    09 Feb 2024 07:01  -  09 Feb 2024 14:08  --------------------------------------------------------  IN: 250 mL / OUT: 200 mL / NET: 50 mL      GENERAL: [ ]Cachexia    [ ]Alert  [ ]Oriented x   [ ]Lethargic  [ ]Unarousable  [ ]Verbal  [ ]Non-Verbal  Behavioral:   [ ] Anxiety  [ ] Delirium [ ] Agitation [ ] Other  HEENT:  [ ]Normal   [ ]Dry mouth   [ ]ET Tube/Trach  [ ]Oral lesions  PULMONARY:   [ ]Clear [ ]Tachypnea  [ ]Audible excessive secretions   [ ]Rhonchi        [ ]Right [ ]Left [ ]Bilateral  [ ]Crackles        [ ]Right [ ]Left [ ]Bilateral  [ ]Wheezing     [ ]Right [ ]Left [ ]Bilateral  [ ]Diminished breath sounds [ ]right [ ]left [ ]bilateral  CARDIOVASCULAR:    [ ]Regular [ ]Irregular [ ]Tachy  [ ]Foster [ ]Murmur [ ]Other  GASTROINTESTINAL:  [ ]Soft  [ ]Distended   [ ]+BS  [ ]Non tender [ ]Tender  [ ]Other [ ]PEG [ ]OGT/ NGT  Last BM:  GENITOURINARY:  [ ]Normal [ ] Incontinent   [ ]Oliguria/Anuria   [ ][place]  MUSCULOSKELETAL:   [ ]Normal   [ ]Weakness  [ ]Bed/Wheelchair bound [ ]Edema  NEUROLOGIC:   [ ]No focal deficits  [ ]Cognitive impairment  [ ]Dysphagia [ ]Dysarthria [ ]Paresis [ ]Other   SKIN:   [ ]Normal  [ ]Rash  [ ]Other  [ ]Pressure ulcer(s)       Present on admission [ ]y [ ]n    CRITICAL CARE:  [ ] Shock Present  [ ]Septic [ ]Cardiogenic [ ]Neurologic [ ]Hypovolemic  [ ]  Vasopressors [ ]  Inotropes   [ ]Respiratory failure present [ ]Mechanical ventilation [ ]Non-invasive ventilatory support [ ]High flow  Mode: AC/ CMV (Assist Control/ Continuous Mandatory Ventilation), RR (machine): 24, TV (machine): 450, FiO2: 80, PEEP: 5, ITime: 1, MAP: 13, PIP: 26  [ ]Acute  [ ]Chronic [ ]Hypoxic  [ ]Hypercarbic [ ]Other  [ ]Other organ failure     LABS:                        12.2   21.35 )-----------( 161      ( 09 Feb 2024 06:00 )             40.7   02-09    144  |  108  |  39<H>  ----------------------------<  222<H>  5.3   |  22  |  2.01<H>    Ca    7.7<L>      09 Feb 2024 06:00  Phos  4.4     02-09  Mg     1.2     02-09    TPro  5.2<L>  /  Alb  2.5<L>  /  TBili  0.5  /  DBili  x   /  AST  181<H>  /  ALT  89<H>  /  AlkPhos  109  02-09      Urinalysis Basic - ( 09 Feb 2024 06:00 )    Color: x / Appearance: x / SG: x / pH: x  Gluc: 222 mg/dL / Ketone: x  / Bili: x / Urobili: x   Blood: x / Protein: x / Nitrite: x   Leuk Esterase: x / RBC: x / WBC x   Sq Epi: x / Non Sq Epi: x / Bacteria: x      RADIOLOGY & ADDITIONAL STUDIES:    PROTEIN CALORIE MALNUTRITION PRESENT: [ ]mild [ ]moderate [ ]severe [ ]underweight [ ]morbid obesity  https://www.andeal.org/vault/2440/web/files/ONC/Table_Clinical%20Characteristics%20to%20Document%20Malnutrition-White%20JV%20et%20al%037086.pdf    Height (cm): 162.6 (02-08-24 @ 08:21), 162.6 (01-01-24 @ 11:41), 165.1 (06-18-23 @ 10:42)  Weight (kg): 85.1 (02-08-24 @ 13:00), 63.5 (01-01-24 @ 11:41), 65.8 (06-18-23 @ 10:42)  BMI (kg/m2): 32.2 (02-08-24 @ 13:00), 24 (02-08-24 @ 08:21), 24 (01-01-24 @ 11:41)    [ ]PPSV2 < or = to 30% [ ]significant weight loss  [ ]poor nutritional intake  [ ]anasarca[ ]Artificial Nutrition      Other REFERRALS:  [ ]Hospice  [ ]Child Life  [ ]Social Work  [ ]Case management [ ]Holistic Therapy     Goals of Care Document: ARELY Abreu (02-08-24 @ 19:57)  Goals of Care Conversation:   Participants:  · Participants  Family  · Child(beau)  Zainab (daughter)  · Relative  Lucas (grandson)    Advance Directives:  · Caregiver:  information could not be obtained    Conversation Discussion:  · Conversation  MOLST Discussed  · Conversation Details  I had an extensive conversation with Zainab (daughter) and Lucas (grandson) via telephone regarding pt's current condition. I explained that given pt's prolonged downtime, one of the biggest concerns is that she has sustained significant anoxic injury. Pt has not been on any sedation since admission. She overbreathes the ventilator. Her pupils are not reactive, cough/gag absent, and she does not withdraw to pain. Pt's family is understanding that her prognosis is poor, and would not want her to undergo any further heroic measures as it would not have any benefit to her quality of life. If she should suffer another cardiac arrest, they asked that we allow her to pass peacefully and not perform CPR. We also discussed the fact that she is requiring vasopressor support and the potential need for a central line should this requirement continue to increase. Pt's family verbalized understanding of the risks associated of running vasopressors through a peripheral IV, but have asked that we defer central line insertion as they do not want to put pt through any invasive procedures given the fact that her prognosis is poor. They would like to wait 24 hrs to see if pt has any improvement in neurologic status, but if not, are open to discussing transition to comfort care.    Personal Advance Directives Treatment Guidelines:   MOLST:  · Completed  08-Feb-2024    Location of Discussion:   Location of Discussion:  · Location of discussion  Telephone      Electronic Signatures:  Melva Abreu)  (Signed 08-Feb-2024 21:04)  	Authored: Goals of Care Conversation, Personal Advance Directives Treatment Guidelines, Location of Discussion      Last Updated: 08-Feb-2024 21:04 by Melva Abreu)     HPI:  83F Throat CA (s/p surgery), HTN, HLD and DM2 choked and aspirated during breakfast this morning and went into cardiac Arrest.  EMS was activated by family members, CPR performed by police and EMS intubated in field.  She underwent 2 rounds of CPR with Epi in field followed by additional rounds of CPR after arrival to our hospital with 5 more rounds of Epi.  Patient did have ROSC and now on vasopressors.  Most of the history obtained by family at bedside.     Patient has been having progressive worsening of her underlying dysphagia that resulted from her throat CA. She has been modifying her food at home and doing well overall.  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     Routine labs and imaging performed in ED showing severe acidosis on ABG, with images confirming multiple acute left sided rib fractures and some infiltrates in lungs.  (08 Feb 2024 11:12)    PERTINENT PM/SXH:   Cancer of neck    HTN (hypertension)    Hypothyroidism    Diabetes      No significant past surgical history    History of appendectomy    Elective surgery      FAMILY HISTORY:  Family history of parkinsonism (Mother)      Family Hx substance abuse [ ]yes [x ]no  ITEMS NOT CHECKED ARE NOT PRESENT    SOCIAL HISTORY:   Significant other/partner[ ]  Children[ x]  Zoroastrianism/Spirituality:  Substance hx:  [ ]   Tobacco hx:  [ ]   Alcohol hx: [ ]   Home Opioid hx:  [ ] I-Stop Reference No:  Living Situation: [x ]Home  [ ]Long term care  [ ]Rehab [ ]Other    ADVANCE DIRECTIVES:    DNR/MOLST  [ ]  Living Will  [ ]   DECISION MAKER(s):  [ ] Health Care Proxy(s)  [x ] Surrogate(s)  [ ] Guardian           Name(s): Phone Number(s): ernie Lamb, number per EMR    BASELINE (I)ADL(s) (prior to admission):  [place]: [x ]Total  [ ] Moderate [ ]Dependent    Allergies    shellfish (Unknown)  penicillin (Unknown)    Intolerances    MEDICATIONS  (STANDING):  HYDROmorphone  Injectable 1 milliGRAM(s) IV Push every 4 hours  LORazepam   Injectable 1 milliGRAM(s) IV Push every 4 hours    MEDICATIONS  (PRN):  glycopyrrolate Injectable 0.2 milliGRAM(s) IV Push every 6 hours PRN secretions  HYDROmorphone  Injectable 1 milliGRAM(s) IV Push every 1 hour PRN Moderate Pain (4 - 6)  HYDROmorphone  Injectable 2 milliGRAM(s) IV Push every 1 hour PRN dyspnea  HYDROmorphone  Injectable 2 milliGRAM(s) IV Push every 1 hour PRN Severe Pain (7 - 10)  LORazepam   Injectable 1 milliGRAM(s) IV Push every 1 hour PRN anxiety, agitation, refractory dyspnea    PRESENT SYMPTOMS: [x ]Unable to self-report  [ ] CPOT [ x] PAINADs [ x] RDOS  Source if other than patient:  [ ]Family   [ ]Team     Pain: [ ]yes [ ]no  QOL impact -   Location -                    Aggravating factors -  Quality -  Radiation -  Timing-  Severity (0-10 scale):  Minimal acceptable level (0-10 scale):     CPOT:    https://www.[facility].org/getattachment/[email or web address]/Critical-Care-Pain-Observation-Tool-(CPOT)    PAIN AD Score:   http://geriatrictoolkit.[facility]/cog/painad.pdf (press ctrl +  left click to view)    Dyspnea:                           [ ]Mild [ ]Moderate [ ]Severe      RDOS:  0 to 2  minimal or no respiratory distress   3  mild distress  4 to 6 moderate distress  >7 severe distress  https://[email or web address].net/handouts/hen/Respiratory_Distress_Observation_Scale.pdf (Ctrl +  left click to view)     Anxiety:                             [ ]Mild [ ]Moderate [ ]Severe  Fatigue:                             [ ]Mild [ ]Moderate [ ]Severe  Nausea:                             [ ]Mild [ ]Moderate [ ]Severe  Loss of appetite:              [ ]Mild [ ]Moderate [ ]Severe  Constipation:                    [ ]Mild [ ]Moderate [ ]Severe    PCSSQ[Palliative Care Spiritual Screening Question]   Severity (0-10):  Score of 4 or > indicate consideration of Chaplaincy referral.  Chaplaincy Referral: [ ] yes [ ] refused [ ] following [ x] Deferred     Caregiver [place]? : [ ] yes [ ] no [x ] Deferred [ ] Declined             Social work referral [ ] Patient & Family Centered Care Referral [ ]     Anticipatory Grief present?:  [ ] yes [ ] no  [ x] Deferred                  Social work referral [ ] Chaplaincy Referral[ ]      Other Symptoms:  [ ]All other review of systems negative     Palliative Performance Status Version 2:     10    %    http://npcrc.org/files/news/palliative_performance_scale_ppsv2.pdf  PHYSICAL EXAM:  Vital Signs Last 24 Hrs  T(C): 36.8 (09 Feb 2024 00:00), Max: 38 (08 Feb 2024 19:30)  T(F): 98.3 (09 Feb 2024 00:00), Max: 100.4 (08 Feb 2024 19:30)  HR: 76 (09 Feb 2024 12:26) (72 - 105)  BP: 128/40 (09 Feb 2024 10:00) (83/41 - 209/198)  BP(mean): 62 (09 Feb 2024 10:00) (44 - 203)  RR: 29 (09 Feb 2024 10:00) (17 - 42)  SpO2: 100% (09 Feb 2024 12:26) (96% - 100%)    Parameters below as of 09 Feb 2024 05:00      O2 Concentration (%): 80 I&O's Summary    08 Feb 2024 07:01  -  09 Feb 2024 07:00  --------------------------------------------------------  IN: 3284 mL / OUT: 460 mL / NET: 2824 mL    09 Feb 2024 07:01  -  09 Feb 2024 14:08  --------------------------------------------------------  IN: 250 mL / OUT: 200 mL / NET: 50 mL      GENERAL: [ ]Cachexia    [ ]Alert  [ ]Oriented x   [ ]Lethargic  [ x]Unarousable  [ ]Verbal  [ ]Non-Verbal  Behavioral:   [ ] Anxiety  [ ] Delirium [ ] Agitation [ ] Other  HEENT:  [ ]Normal   [ ]Dry mouth   [ x]ET Tube/Trach  [ ]Oral lesions  PULMONARY: vent  [x ]Clear [ ]Tachypnea  [ ]Audible excessive secretions   [ ]Rhonchi        [ ]Right [ ]Left [ ]Bilateral  [ ]Crackles        [ ]Right [ ]Left [ ]Bilateral  [ ]Wheezing     [ ]Right [ ]Left [ ]Bilateral  [ ]Diminished breath sounds [ ]right [ ]left [ ]bilateral  CARDIOVASCULAR:    [ ]Regular [ x]Irregular [ ]Tachy  [ ]Foster [ ]Murmur [ ]Other  GASTROINTESTINAL:  [ x]Soft  [ ]Distended   [ ]+BS  [ ]Non tender [ ]Tender  [ ]Other [ ]PEG [ ]OGT/ NGT  Last BM:  GENITOURINARY:  [ ]Normal [ x] Incontinent   [ ]Oliguria/Anuria   [ ][place]  MUSCULOSKELETAL:   [ ]Normal   [ ]Weakness  [x ]Bed/Wheelchair bound [ ]Edema  NEUROLOGIC: minimal brain stem reflexes  [ ]No focal deficits  [ ]Cognitive impairment  [ ]Dysphagia [ ]Dysarthria [ ]Paresis [x ]Other   SKIN:   [ ]Normal  [ ]Rash  [ ]Other  [ ]Pressure ulcer(s)       Present on admission [ ]y [ ]n    CRITICAL CARE:  [ ] Shock Present  [ ]Septic [ ]Cardiogenic [ ]Neurologic [ ]Hypovolemic  [ ]  Vasopressors [ ]  Inotropes   [x ]Respiratory failure present [ x]Mechanical ventilation [ ]Non-invasive ventilatory support [ ]High flow  Mode: AC/ CMV (Assist Control/ Continuous Mandatory Ventilation), RR (machine): 24, TV (machine): 450, FiO2: 80, PEEP: 5, ITime: 1, MAP: 13, PIP: 26  [x ]Acute  [ ]Chronic [x ]Hypoxic  [ ]Hypercarbic [ ]Other  [ ]Other organ failure     LABS:                        12.2   21.35 )-----------( 161      ( 09 Feb 2024 06:00 )             40.7   02-09    144  |  108  |  39<H>  ----------------------------<  222<H>  5.3   |  22  |  2.01<H>    Ca    7.7<L>      09 Feb 2024 06:00  Phos  4.4     02-09  Mg     1.2     02-09    TPro  5.2<L>  /  Alb  2.5<L>  /  TBili  0.5  /  DBili  x   /  AST  181<H>  /  ALT  89<H>  /  AlkPhos  109  02-09      Urinalysis Basic - ( 09 Feb 2024 06:00 )    Color: x / Appearance: x / SG: x / pH: x  Gluc: 222 mg/dL / Ketone: x  / Bili: x / Urobili: x   Blood: x / Protein: x / Nitrite: x   Leuk Esterase: x / RBC: x / WBC x   Sq Epi: x / Non Sq Epi: x / Bacteria: x      RADIOLOGY & ADDITIONAL STUDIES:  < from: CT Chest No Cont (02.08.24 @ 09:31) >    ACC: [id number] EXAM:  CT CHEST   ORDERED BY: STEVE NELSON     PROCEDURE DATE:  02/08/2024          INTERPRETATION:  CLINICAL INFORMATION: Status post arrest, abnormal chest   x-ray    COMPARISON: CT chest 1/14/2022    CONTRAST/COMPLICATIONS:  IV Contrast: NONE  Oral Contrast: NONE  Complications: None reported at time of study completion    PROCEDURE:  CT of the Chest was performed.  Sagittal and coronal reformats were performed.    FINDINGS:    LUNGS AND AIRWAYS: Patent central airways. Endotracheal tube   malpositioned tip in the proximal right mainstem bronchus..  Extensive   bilateral airspace disease right greater than left and most pronounced in   the bilateral lower lobes. Correlate for pulmonary edema and/or   inflammation/infection..  PLEURA: Small left pneumothorax.  MEDIASTINUM AND ISAC: No lymphadenopathy. Nonspecific coarse   calcification right thyroid lobe. Dilated esophagus with fluid/debris   suggests gastroesophageal reflux  VESSELS: Nonaneurysmal.  HEART: Heart size is normal. No pericardial effusion.  CHEST WALL AND LOWER NECK: Within normal limits.  VISUALIZED UPPER ABDOMEN: Pneumobilia left hepatic lobe.  BONES: Acute displaced fractures left sixth, seventh and eighth lateral   ribs. Degenerative changes.    IMPRESSION:  Malpositioned ET tube tip in the right mainstem bronchus.  Small left pneumothorax.  Acute fractures left sixth seventh and eighth ribs.  Extensive bilateral airspace disease as described.  Additional findings as discussed.    Critical findings discussed with Dr. Nelson prior to this dictation      --- End of Report ---            CHAVO ENRIQUEZ MD; Attending Radiologist  This document has been electronically signed. Feb 8 2024 10:02AM    < end of copied text >      PROTEIN CALORIE MALNUTRITION PRESENT: [ ]mild [ ]moderate [ ]severe [ ]underweight [ ]morbid obesity  https://www.andeal.org/vault/2520/web/files/ONC/Table_Clinical%20Characteristics%20to%20Document%20Malnutrition-White%20JV%20et%20al%202012.pdf    Height (cm): 162.6 (02-08-24 @ 08:21), 162.6 (01-01-24 @ 11:41), 165.1 (06-18-23 @ 10:42)  Weight (kg): 85.1 (02-08-24 @ 13:00), 63.5 (01-01-24 @ 11:41), 65.8 (06-18-23 @ 10:42)  BMI (kg/m2): 32.2 (02-08-24 @ 13:00), 24 (02-08-24 @ 08:21), 24 (01-01-24 @ 11:41)    [ x]PPSV2 < or = to 30% [ ]significant weight loss  [x ]poor nutritional intake  [ ]anasarca[ ]Artificial Nutrition      Other REFERRALS:  [ ]Hospice  [ ]Child Life  [x ]Social Work  [ ]Case management [ ]Holistic Therapy     Goals of Care Document: ARELY Abreu (02-08-24 @ 19:57)  Goals of Care Conversation:   Participants:  · Participants  Family  · Child(beau)  Zainab (daughter)  · Relative  Lucas (grandson)    Advance Directives:  · Caregiver:  information could not be obtained    Conversation Discussion:  · Conversation  LINDA Discussed  · Conversation Details  I had an extensive conversation with Zainab (daughter) and Lucas (grandson) via telephone regarding pt's current condition. I explained that given pt's prolonged downtime, one of the biggest concerns is that she has sustained significant anoxic injury. Pt has not been on any sedation since admission. She overbreathes the ventilator. Her pupils are not reactive, cough/gag absent, and she does not withdraw to pain. Pt's family is understanding that her prognosis is poor, and would not want her to undergo any further heroic measures as it would not have any benefit to her quality of life. If she should suffer another cardiac arrest, they asked that we allow her to pass peacefully and not perform CPR. We also discussed the fact that she is requiring vasopressor support and the potential need for a central line should this requirement continue to increase. Pt's family verbalized understanding of the risks associated of running vasopressors through a peripheral IV, but have asked that we defer central line insertion as they do not want to put pt through any invasive procedures given the fact that her prognosis is poor. They would like to wait 24 hrs to see if pt has any improvement in neurologic status, but if not, are open to discussing transition to comfort care.    Personal Advance Directives Treatment Guidelines:   MOLST:  · Completed  08-Feb-2024    Location of Discussion:   Location of Discussion:  · Location of discussion  Telephone      Electronic Signatures:  Melva Abreu)  (Signed 08-Feb-2024 21:04)  	Authored: Goals of Care Conversation, Personal Advance Directives Treatment Guidelines, Location of Discussion      Last Updated: 08-Feb-2024 21:04 by Melva Abreu)

## 2024-02-09 NOTE — PROGRESS NOTE ADULT - SUBJECTIVE AND OBJECTIVE BOX
Subjective: Patient continues to be intubated and sedated.  Pressor requirements are up.  No overnight events.     MEDICATIONS  (STANDING):  acetaminophen   IVPB .. 1000 milliGRAM(s) IV Intermittent once  chlorhexidine 0.12% Liquid 15 milliLiter(s) Oral Mucosa every 12 hours  chlorhexidine 2% Cloths 1 Application(s) Topical <User Schedule>  dextrose 5%. 1000 milliLiter(s) (100 mL/Hr) IV Continuous <Continuous>  dextrose 5%. 1000 milliLiter(s) (50 mL/Hr) IV Continuous <Continuous>  dextrose 50% Injectable 12.5 Gram(s) IV Push once  dextrose 50% Injectable 25 Gram(s) IV Push once  dextrose 50% Injectable 25 Gram(s) IV Push once  glucagon  Injectable 1 milliGRAM(s) IntraMuscular once  heparin   Injectable 5000 Unit(s) SubCutaneous every 8 hours  insulin lispro (ADMELOG) corrective regimen sliding scale   SubCutaneous every 6 hours  lactated ringers. 1000 milliLiter(s) (125 mL/Hr) IV Continuous <Continuous>  meropenem  IVPB 1000 milliGRAM(s) IV Intermittent every 12 hours  meropenem  IVPB      norepinephrine Infusion 0.05 MICROgram(s)/kG/Min (7.98 mL/Hr) IV Continuous <Continuous>  pantoprazole  Injectable 40 milliGRAM(s) IV Push daily    MEDICATIONS  (PRN):  dextrose Oral Gel 15 Gram(s) Oral once PRN Blood Glucose LESS THAN 70 milliGRAM(s)/deciliter      Allergies    shellfish (Unknown)  penicillin (Unknown)    Intolerances        Vital Signs Last 24 Hrs  T(C): 36.8 (09 Feb 2024 00:00), Max: 38 (08 Feb 2024 19:30)  T(F): 98.3 (09 Feb 2024 00:00), Max: 100.4 (08 Feb 2024 19:30)  HR: 73 (09 Feb 2024 10:00) (72 - 105)  BP: 128/40 (09 Feb 2024 10:00) (83/41 - 209/198)  BP(mean): 62 (09 Feb 2024 10:00) (44 - 203)  RR: 29 (09 Feb 2024 10:00) (17 - 42)  SpO2: 100% (09 Feb 2024 09:15) (96% - 100%)    Parameters below as of 09 Feb 2024 05:00      O2 Concentration (%): 80    PHYSICAL EXAM:  GENERAL: Unresponsive; Intubated; NO sedation.   HEAD:  Atraumatic, Normocephalic  ENMT: Moist mucous membranes,   NECK: Supple, No JVD, Normal thyroid   CHEST/LUNG: Coarse BS Bilateral  HEART: Regular rate and rhythm; No murmurs, rubs, or gallops  ABDOMEN: Soft, Nontender, Nondistended; Bowel sounds present  EXTREMITIES:  2+ Peripheral Pulses, No clubbing, cyanosis, or edema      LABS:                        12.2   21.35 )-----------( 161      ( 09 Feb 2024 06:00 )             40.7     09 Feb 2024 06:00    144    |  108    |  39     ----------------------------<  222    5.3     |  22     |  2.01     Ca    7.7        09 Feb 2024 06:00  Phos  4.4       09 Feb 2024 06:00  Mg     1.2       09 Feb 2024 06:00    TPro  5.2    /  Alb  2.5    /  TBili  0.5    /  DBili  x      /  AST  181    /  ALT  89     /  AlkPhos  109    09 Feb 2024 06:00      Urinalysis Basic - ( 09 Feb 2024 06:00 )    Color: x / Appearance: x / SG: x / pH: x  Gluc: 222 mg/dL / Ketone: x  / Bili: x / Urobili: x   Blood: x / Protein: x / Nitrite: x   Leuk Esterase: x / RBC: x / WBC x   Sq Epi: x / Non Sq Epi: x / Bacteria: x      CAPILLARY BLOOD GLUCOSE      POCT Blood Glucose.: 205 mg/dL (09 Feb 2024 05:25)  POCT Blood Glucose.: 247 mg/dL (09 Feb 2024 01:09)  POCT Blood Glucose.: 253 mg/dL (08 Feb 2024 23:24)      RADIOLOGY & ADDITIONAL TESTS:    Imaging Personally Reviewed:  [ ] YES     Consultant(s) Notes Reviewed:      Care Discussed with Consultants/Other Providers:    Advanced Directives: [ ] DNR  [ ] No feeding tube  [ ] MOLST in chart  [ ] MOLST completed today  [ ] Unknown

## 2024-02-09 NOTE — PROGRESS NOTE ADULT - ASSESSMENT
83F Throat CA (s/p surgery), HTN, HLD and DM2 choked and aspirated during breakfast this morning and went into cardiac Arrest.  EMS was activated by family members, CPR performed by police and EMS intubated in field.  She underwent 2 rounds of CPR with Epi in field followed by additional rounds of CPR after arrival to our hospital with 5 more rounds of Epi.  Patient did have ROSC and now on vasopressors.    resp failure  card arrest  resus - rib fx - ptx -   ventilated  DM  HTN  HLD  Dysphagia - Aspiration  Head and Neck Ca    prognosis poor  goc discussion- DNR  pall eval  wean pressors as tolerated  rpt ABG  HOB elev  asp prec  oral hygiene  skin care  DM care  serial FS  lung protective vent support  cardio eval in progress  I and O  serial indices  serial imaging to monitor small ptx - as seen on CT  dvt p  emp ABX for poss aspiration event

## 2024-02-09 NOTE — DISCHARGE NOTE FOR THE EXPIRED PATIENT - HOSPITAL COURSE
83F Throat CA (s/p surgery), HTN, HLD and DM2 admitted for Cardiac Arrest and was intubated for 24 hours.  Patient did not make meaninful recovery and terminal wean was requested by family.  Patient choked while eating breakfast and has a history of dysphagia due to her throat cancer. Patient was pronounced at 2:30P

## 2024-02-09 NOTE — DIETITIAN INITIAL EVALUATION ADULT - PERTINENT MEDS FT
MEDICATIONS  (STANDING):  acetaminophen   IVPB .. 1000 milliGRAM(s) IV Intermittent once  chlorhexidine 0.12% Liquid 15 milliLiter(s) Oral Mucosa every 12 hours  chlorhexidine 2% Cloths 1 Application(s) Topical <User Schedule>  dextrose 5%. 1000 milliLiter(s) (100 mL/Hr) IV Continuous <Continuous>  dextrose 5%. 1000 milliLiter(s) (50 mL/Hr) IV Continuous <Continuous>  dextrose 50% Injectable 12.5 Gram(s) IV Push once  dextrose 50% Injectable 25 Gram(s) IV Push once  dextrose 50% Injectable 25 Gram(s) IV Push once  glucagon  Injectable 1 milliGRAM(s) IntraMuscular once  heparin   Injectable 5000 Unit(s) SubCutaneous every 8 hours  insulin lispro (ADMELOG) corrective regimen sliding scale   SubCutaneous every 6 hours  lactated ringers. 1000 milliLiter(s) (125 mL/Hr) IV Continuous <Continuous>  meropenem  IVPB 1000 milliGRAM(s) IV Intermittent every 12 hours  meropenem  IVPB      norepinephrine Infusion 0.05 MICROgram(s)/kG/Min (7.98 mL/Hr) IV Continuous <Continuous>  pantoprazole  Injectable 40 milliGRAM(s) IV Push daily    MEDICATIONS  (PRN):  dextrose Oral Gel 15 Gram(s) Oral once PRN Blood Glucose LESS THAN 70 milliGRAM(s)/deciliter

## 2024-02-09 NOTE — CONSULT NOTE ADULT - PROBLEM SELECTOR RECOMMENDATION 4
emotional support provided to family  compassionate extubation on this date. discussed with family, attending and [facility] RN    Diamond Vera MD, [facility]; Palliative Care Attending, Ethicist. [phone]

## 2024-02-09 NOTE — CONSULT NOTE ADULT - PROBLEM SELECTOR RECOMMENDATION 2
poor prognosis for meaningful neurologic recovery  would need lifelong ventilatory support- not in line with goals

## 2024-02-09 NOTE — CONSULT NOTE ADULT - PROBLEM SELECTOR RECOMMENDATION 3
DNR/do not reintubate  IV dilaudid, IV ativan, IV glycopyrrolate x1 prior to extubation  IV dilaudid, IV ativan ATC  IV dilaudid, IV ativan, IV glycopyrrolate PRN for management of pain, dyspnea, anxiety/agitation, secretions  bowel regimen    [facility] narrative above  plan for extubation on this date, anticipate life expectancy of minutes/hours, unlikely, but possibly days.

## 2024-02-09 NOTE — CONSULT NOTE ADULT - CONVERSATION DETAILS
Met with family on this date  reviewed anoxic injury and poor prognosis for recovery  family states this is not a quality of life patient would find acceptable and they would like to proceed with compassionate extubation  emotional support provided, process reviewed, and all questions answered.  discussed life expectancy post extubation- minutes/hours, unlikely but possibly days.

## 2024-02-09 NOTE — CONSULT NOTE ADULT - SUBJECTIVE AND OBJECTIVE BOX
s/p CA suspect anoxic injury  on pressors  off sedations  spoke to family -- possible terminal wean today is reasonable to to neurologic exam, labs and vital s/p CA suspect anoxic injury  at present does not meet brain dead criteria   on pressors  off sedations  spoke to family -- possible terminal wean today is reasonable to to neurologic exam, labs and vital

## 2024-02-09 NOTE — PROGRESS NOTE ADULT - SUBJECTIVE AND OBJECTIVE BOX
Chief Complaint: Cardiac arrest    Interval Events: No changes.    Review of Systems:  Unable to obtain    Physical Exam:  Vitals:        Vital Signs Last 24 Hrs  T(C): 36.8 (09 Feb 2024 00:00), Max: 38 (08 Feb 2024 19:30)  T(F): 98.3 (09 Feb 2024 00:00), Max: 100.4 (08 Feb 2024 19:30)  HR: 72 (09 Feb 2024 08:30) (72 - 105)  BP: 151/111 (09 Feb 2024 08:30) (70/43 - 209/198)  BP(mean): 122 (09 Feb 2024 08:30) (44 - 203)  RR: 31 (09 Feb 2024 08:30) (16 - 42)  SpO2: 100% (09 Feb 2024 08:15) (82% - 100%)  Parameters below as of 09 Feb 2024 05:00  O2 Concentration (%): 80  General: Unresponsive  HEENT: Intubated  Neck: No JVD, no carotid bruit  Lungs: CTAB  CV: RRR, nl S1/S2, no M/R/G  Abdomen: S/NT/ND, +BS  Extremities: No LE edema, no cyanosis  Neuro: AAOx0  Skin: No rash    Labs:                        12.2   21.35 )-----------( 161      ( 09 Feb 2024 06:00 )             40.7     02-09    144  |  108  |  39<H>  ----------------------------<  222<H>  5.3   |  22  |  2.01<H>    Ca    7.7<L>      09 Feb 2024 06:00  Phos  4.4     02-09  Mg     1.2     02-09    TPro  5.2<L>  /  Alb  2.5<L>  /  TBili  0.5  /  DBili  x   /  AST  181<H>  /  ALT  89<H>  /  AlkPhos  109  02-09            ECG/Telemetry: Sinus rhythm

## 2024-02-09 NOTE — CASE MANAGEMENT PROGRESS NOTE - NSCMPROGRESSNOTE_GEN_ALL_CORE
Update Communication Note: As per morning rounds in Special Care Unit, patient intubated on ventilator, patient not waking up unresponsive, and overbreathing the vent. Will continue to follow patient.  Update Communication Note: As per morning rounds in Special Care Unit, patient intubated on ventilator, patient not waking up unresponsive, and overbreathing the vent. ? terminally extubate patient. Will continue to follow patient.

## 2024-02-09 NOTE — PROGRESS NOTE ADULT - SUBJECTIVE AND OBJECTIVE BOX
Date/Time Patient Seen:  		  Referring MD:   Data Reviewed	       Patient is a 83y old  Female who presents with a chief complaint of Cardiac arrest (08 Feb 2024 19:24)      Subjective/HPI     PAST MEDICAL & SURGICAL HISTORY:  Cancer of neck    HTN (hypertension)    Hypothyroidism    Diabetes    No significant past surgical history    History of appendectomy    Elective surgery  s/p prolapse bladder repair          Medication list         MEDICATIONS  (STANDING):  acetaminophen   IVPB .. 1000 milliGRAM(s) IV Intermittent once  chlorhexidine 0.12% Liquid 15 milliLiter(s) Oral Mucosa every 12 hours  chlorhexidine 2% Cloths 1 Application(s) Topical <User Schedule>  dextrose 5%. 1000 milliLiter(s) (100 mL/Hr) IV Continuous <Continuous>  dextrose 5%. 1000 milliLiter(s) (50 mL/Hr) IV Continuous <Continuous>  dextrose 50% Injectable 12.5 Gram(s) IV Push once  dextrose 50% Injectable 25 Gram(s) IV Push once  dextrose 50% Injectable 25 Gram(s) IV Push once  glucagon  Injectable 1 milliGRAM(s) IntraMuscular once  heparin   Injectable 5000 Unit(s) SubCutaneous every 8 hours  insulin lispro (ADMELOG) corrective regimen sliding scale   SubCutaneous every 6 hours  lactated ringers. 1000 milliLiter(s) (125 mL/Hr) IV Continuous <Continuous>  meropenem  IVPB      meropenem  IVPB 1000 milliGRAM(s) IV Intermittent every 12 hours  norepinephrine Infusion 0.05 MICROgram(s)/kG/Min (7.98 mL/Hr) IV Continuous <Continuous>  pantoprazole  Injectable 40 milliGRAM(s) IV Push daily    MEDICATIONS  (PRN):  dextrose Oral Gel 15 Gram(s) Oral once PRN Blood Glucose LESS THAN 70 milliGRAM(s)/deciliter         Vitals log        ICU Vital Signs Last 24 Hrs  T(C): 36.8 (09 Feb 2024 00:00), Max: 38 (08 Feb 2024 19:30)  T(F): 98.3 (09 Feb 2024 00:00), Max: 100.4 (08 Feb 2024 19:30)  HR: 80 (09 Feb 2024 06:00) (64 - 105)  BP: 141/40 (09 Feb 2024 06:00) (54/34 - 209/198)  BP(mean): 69 (09 Feb 2024 06:00) (39 - 203)  ABP: --  ABP(mean): --  RR: 24 (09 Feb 2024 06:00) (14 - 42)  SpO2: 100% (09 Feb 2024 05:30) (74% - 100%)    O2 Parameters below as of 09 Feb 2024 05:00      O2 Concentration (%): 80         Mode: AC/ CMV (Assist Control/ Continuous Mandatory Ventilation)  RR (machine): 24  TV (machine): 450  FiO2: 80  PEEP: 5  ITime: 1  MAP: 10  PIP: 12      Input and Output:  I&O's Detail    08 Feb 2024 07:01  -  09 Feb 2024 06:08  --------------------------------------------------------  IN:    IV PiggyBack: 100 mL    Lactated Ringers: 2250 mL    Norepinephrine: 258 mL    Norepinephrine: 676 mL  Total IN: 3284 mL    OUT:    Ureteral Catheter (mL): 460 mL  Total OUT: 460 mL    Total NET: 2824 mL          Lab Data                        12.5   24.77 )-----------( 219      ( 08 Feb 2024 19:30 )             41.7     02-08    140  |  108  |  33<H>  ----------------------------<  362<H>  4.5   |  23  |  1.80<H>    Ca    7.1<L>      08 Feb 2024 19:30  Phos  3.6     02-08  Mg     1.8     02-08    TPro  5.6<L>  /  Alb  2.4<L>  /  TBili  0.5  /  DBili  x   /  AST  210<H>  /  ALT  102<H>  /  AlkPhos  116  02-08    ABG - ( 08 Feb 2024 19:38 )  pH, Arterial: 7.22  pH, Blood: x     /  pCO2: 51    /  pO2: 117   / HCO3: 21    / Base Excess: -6.8  /  SaO2: 99.2                    Review of Systems	      Objective     Physical Examination    heart s1s2  lung dec BS  head nc      Pertinent Lab findings & Imaging      Ivonne:  NO   Adequate UO     I&O's Detail    08 Feb 2024 07:01  -  09 Feb 2024 06:08  --------------------------------------------------------  IN:    IV PiggyBack: 100 mL    Lactated Ringers: 2250 mL    Norepinephrine: 258 mL    Norepinephrine: 676 mL  Total IN: 3284 mL    OUT:    Ureteral Catheter (mL): 460 mL  Total OUT: 460 mL    Total NET: 2824 mL               Discussed with:     Cultures:	        Radiology

## 2024-02-09 NOTE — DIETITIAN INITIAL EVALUATION ADULT - PERTINENT LABORATORY DATA
02-09    144  |  108  |  39<H>  ----------------------------<  222<H>  5.3   |  22  |  2.01<H>    Ca    7.7<L>      09 Feb 2024 06:00  Phos  4.4     02-09  Mg     1.2     02-09    TPro  5.2<L>  /  Alb  2.5<L>  /  TBili  0.5  /  DBili  x   /  AST  181<H>  /  ALT  89<H>  /  AlkPhos  109  02-09  POCT Blood Glucose.: 205 mg/dL (02-09-24 @ 05:25)  A1C with Estimated Average Glucose Result: 8.5 % (06-18-23 @ 12:10)

## 2024-02-09 NOTE — CONSULT NOTE ADULT - CONSULT REASON
goals of care, cardiac arrest, anoxic brain injury
.
Acute respiratory failure, cardiac arrest
resp failure  card arrest  aspiration  ptx  rib fx

## 2024-02-09 NOTE — PROGRESS NOTE ADULT - ASSESSMENT
The patient is an 83 year old female with a history of HTN, HL, DM, hypothyroidism, throat cancer who presents with cardiac arrest in the setting of acute respiratory failure.    Plan:  - ECG likely with sinus rhythm and AIVR with AV dissociation  - CT chest with malpositioned ET tube, bilateral infiltrates, small PTX, and acute rib fractures  - Bedside echo performed by me; very technically limited but LV systolic function appears preserved  - Echo pending  - Continue norepinephrine and titrate to MAP of 65  - IV antibiotics  - Mechanical ventilation  - Likely anoxic brain injury  - Overall poor prognosis. Comfort care would be most appropriate.

## 2024-02-09 NOTE — CONSULT NOTE ADULT - ASSESSMENT
83F Throat CA (s/p surgery), HTN, HLD and DM2 choked and aspirated during breakfast this morning and went into cardiac Arrest. S/p CPR with ROSC, now with anoxic brain injury. Palliative consulted for [facility]

## 2024-02-11 LAB
CULTURE RESULTS: ABNORMAL
SPECIMEN SOURCE: SIGNIFICANT CHANGE UP

## 2024-02-12 LAB — GLUCOSE BLDC GLUCOMTR-MCNC: 124 MG/DL — HIGH (ref 70–99)
